# Patient Record
Sex: MALE | Race: WHITE | NOT HISPANIC OR LATINO | Employment: UNEMPLOYED | URBAN - METROPOLITAN AREA
[De-identification: names, ages, dates, MRNs, and addresses within clinical notes are randomized per-mention and may not be internally consistent; named-entity substitution may affect disease eponyms.]

---

## 2018-09-24 ENCOUNTER — APPOINTMENT (OUTPATIENT)
Dept: RADIOLOGY | Facility: MEDICAL CENTER | Age: 58
DRG: 222 | End: 2018-09-24
Attending: NURSE PRACTITIONER
Payer: MEDICARE

## 2018-09-24 ENCOUNTER — HOSPITAL ENCOUNTER (INPATIENT)
Facility: MEDICAL CENTER | Age: 58
LOS: 11 days | DRG: 222 | End: 2018-10-05
Attending: EMERGENCY MEDICINE | Admitting: FAMILY MEDICINE
Payer: MEDICARE

## 2018-09-24 ENCOUNTER — APPOINTMENT (OUTPATIENT)
Dept: RADIOLOGY | Facility: MEDICAL CENTER | Age: 58
DRG: 222 | End: 2018-09-24
Attending: EMERGENCY MEDICINE
Payer: MEDICARE

## 2018-09-24 ENCOUNTER — APPOINTMENT (OUTPATIENT)
Dept: CARDIOLOGY | Facility: MEDICAL CENTER | Age: 58
DRG: 222 | End: 2018-09-24
Attending: NURSE PRACTITIONER
Payer: MEDICARE

## 2018-09-24 DIAGNOSIS — I44.2 COMPLETE HEART BLOCK (HCC): ICD-10-CM

## 2018-09-24 DIAGNOSIS — G89.29 OTHER CHRONIC PAIN: ICD-10-CM

## 2018-09-24 DIAGNOSIS — I21.3 ST ELEVATION MYOCARDIAL INFARCTION (STEMI), UNSPECIFIED ARTERY (HCC): ICD-10-CM

## 2018-09-24 DIAGNOSIS — I48.91 ATRIAL FIBRILLATION WITH RAPID VENTRICULAR RESPONSE (HCC): ICD-10-CM

## 2018-09-24 DIAGNOSIS — I21.02 ST ELEVATION MYOCARDIAL INFARCTION INVOLVING LEFT ANTERIOR DESCENDING (LAD) CORONARY ARTERY (HCC): ICD-10-CM

## 2018-09-24 DIAGNOSIS — I21.02 STEMI INVOLVING LEFT ANTERIOR DESCENDING CORONARY ARTERY (HCC): ICD-10-CM

## 2018-09-24 DIAGNOSIS — R57.0 CARDIOGENIC SHOCK (HCC): ICD-10-CM

## 2018-09-24 PROBLEM — E11.9 DM (DIABETES MELLITUS) (HCC): Status: ACTIVE | Noted: 2018-09-24

## 2018-09-24 PROBLEM — I10 HTN (HYPERTENSION), MALIGNANT: Status: ACTIVE | Noted: 2018-09-24

## 2018-09-24 PROBLEM — F17.200 SMOKING: Status: ACTIVE | Noted: 2018-09-24

## 2018-09-24 PROBLEM — I24.9 ACUTE CORONARY SYNDROME (HCC): Status: ACTIVE | Noted: 2018-09-24

## 2018-09-24 LAB
ABO GROUP BLD: NORMAL
ALBUMIN SERPL BCP-MCNC: 3.5 G/DL (ref 3.2–4.9)
ALBUMIN SERPL BCP-MCNC: 3.7 G/DL (ref 3.2–4.9)
ALBUMIN/GLOB SERPL: 1.3 G/DL
ALBUMIN/GLOB SERPL: 1.5 G/DL
ALP SERPL-CCNC: 68 U/L (ref 30–99)
ALP SERPL-CCNC: 69 U/L (ref 30–99)
ALT SERPL-CCNC: 180 U/L (ref 2–50)
ALT SERPL-CCNC: 230 U/L (ref 2–50)
ANION GAP SERPL CALC-SCNC: 20 MMOL/L (ref 0–11.9)
ANION GAP SERPL CALC-SCNC: 21 MMOL/L (ref 0–11.9)
APPEARANCE UR: ABNORMAL
APTT PPP: 136.4 SEC (ref 24.7–36)
APTT PPP: 29.3 SEC (ref 24.7–36)
AST SERPL-CCNC: 211 U/L (ref 12–45)
AST SERPL-CCNC: 306 U/L (ref 12–45)
BACTERIA #/AREA URNS HPF: NEGATIVE /HPF
BASOPHILS # BLD AUTO: 0.1 % (ref 0–1.8)
BASOPHILS # BLD AUTO: 0.2 % (ref 0–1.8)
BASOPHILS # BLD: 0.03 K/UL (ref 0–0.12)
BASOPHILS # BLD: 0.04 K/UL (ref 0–0.12)
BILIRUB SERPL-MCNC: 1.7 MG/DL (ref 0.1–1.5)
BILIRUB SERPL-MCNC: 2.5 MG/DL (ref 0.1–1.5)
BILIRUB UR QL STRIP.AUTO: NEGATIVE
BLD GP AB SCN SERPL QL: NORMAL
BNP SERPL-MCNC: 633 PG/ML (ref 0–100)
BUN SERPL-MCNC: 37 MG/DL (ref 8–22)
BUN SERPL-MCNC: 38 MG/DL (ref 8–22)
CALCIUM SERPL-MCNC: 8.6 MG/DL (ref 8.5–10.5)
CALCIUM SERPL-MCNC: 8.7 MG/DL (ref 8.5–10.5)
CHLORIDE SERPL-SCNC: 96 MMOL/L (ref 96–112)
CHLORIDE SERPL-SCNC: 96 MMOL/L (ref 96–112)
CO2 SERPL-SCNC: 15 MMOL/L (ref 20–33)
CO2 SERPL-SCNC: 17 MMOL/L (ref 20–33)
COLOR UR: YELLOW
CREAT SERPL-MCNC: 1.47 MG/DL (ref 0.5–1.4)
CREAT SERPL-MCNC: 1.67 MG/DL (ref 0.5–1.4)
EKG IMPRESSION: NORMAL
EKG IMPRESSION: NORMAL
EOSINOPHIL # BLD AUTO: 0.01 K/UL (ref 0–0.51)
EOSINOPHIL # BLD AUTO: 0.01 K/UL (ref 0–0.51)
EOSINOPHIL NFR BLD: 0 % (ref 0–6.9)
EOSINOPHIL NFR BLD: 0.1 % (ref 0–6.9)
EPI CELLS #/AREA URNS HPF: ABNORMAL /HPF
ERYTHROCYTE [DISTWIDTH] IN BLOOD BY AUTOMATED COUNT: 47.3 FL (ref 35.9–50)
ERYTHROCYTE [DISTWIDTH] IN BLOOD BY AUTOMATED COUNT: 48.1 FL (ref 35.9–50)
GLOBULIN SER CALC-MCNC: 2.4 G/DL (ref 1.9–3.5)
GLOBULIN SER CALC-MCNC: 2.6 G/DL (ref 1.9–3.5)
GLUCOSE SERPL-MCNC: 367 MG/DL (ref 65–99)
GLUCOSE SERPL-MCNC: 370 MG/DL (ref 65–99)
GLUCOSE UR STRIP.AUTO-MCNC: >=1000 MG/DL
HCT VFR BLD AUTO: 42.7 % (ref 42–52)
HCT VFR BLD AUTO: 43.2 % (ref 42–52)
HGB BLD-MCNC: 14.2 G/DL (ref 14–18)
HGB BLD-MCNC: 14.3 G/DL (ref 14–18)
HYALINE CASTS #/AREA URNS LPF: ABNORMAL /LPF
IMM GRANULOCYTES # BLD AUTO: 0.1 K/UL (ref 0–0.11)
IMM GRANULOCYTES # BLD AUTO: 0.22 K/UL (ref 0–0.11)
IMM GRANULOCYTES NFR BLD AUTO: 0.5 % (ref 0–0.9)
IMM GRANULOCYTES NFR BLD AUTO: 1.1 % (ref 0–0.9)
INR PPP: 1.34 (ref 0.87–1.13)
INR PPP: 2.49 (ref 0.87–1.13)
KETONES UR STRIP.AUTO-MCNC: 15 MG/DL
LEUKOCYTE ESTERASE UR QL STRIP.AUTO: NEGATIVE
LIPASE SERPL-CCNC: 5 U/L (ref 11–82)
LYMPHOCYTES # BLD AUTO: 0.89 K/UL (ref 1–4.8)
LYMPHOCYTES # BLD AUTO: 1.92 K/UL (ref 1–4.8)
LYMPHOCYTES NFR BLD: 4.5 % (ref 22–41)
LYMPHOCYTES NFR BLD: 9.4 % (ref 22–41)
MCH RBC QN AUTO: 30.5 PG (ref 27–33)
MCH RBC QN AUTO: 31.2 PG (ref 27–33)
MCHC RBC AUTO-ENTMCNC: 33.1 G/DL (ref 33.7–35.3)
MCHC RBC AUTO-ENTMCNC: 33.3 G/DL (ref 33.7–35.3)
MCV RBC AUTO: 91.6 FL (ref 81.4–97.8)
MCV RBC AUTO: 94.3 FL (ref 81.4–97.8)
MICRO URNS: ABNORMAL
MONOCYTES # BLD AUTO: 1.61 K/UL (ref 0–0.85)
MONOCYTES # BLD AUTO: 1.76 K/UL (ref 0–0.85)
MONOCYTES NFR BLD AUTO: 7.9 % (ref 0–13.4)
MONOCYTES NFR BLD AUTO: 8.9 % (ref 0–13.4)
NEUTROPHILS # BLD AUTO: 16.72 K/UL (ref 1.82–7.42)
NEUTROPHILS # BLD AUTO: 16.91 K/UL (ref 1.82–7.42)
NEUTROPHILS NFR BLD: 82.1 % (ref 44–72)
NEUTROPHILS NFR BLD: 85.2 % (ref 44–72)
NITRITE UR QL STRIP.AUTO: NEGATIVE
NRBC # BLD AUTO: 0 K/UL
NRBC # BLD AUTO: 0 K/UL
NRBC BLD-RTO: 0 /100 WBC
NRBC BLD-RTO: 0 /100 WBC
PH UR STRIP.AUTO: 5 [PH]
PLATELET # BLD AUTO: 154 K/UL (ref 164–446)
PLATELET # BLD AUTO: 156 K/UL (ref 164–446)
PMV BLD AUTO: 10.8 FL (ref 9–12.9)
PMV BLD AUTO: 10.9 FL (ref 9–12.9)
POTASSIUM SERPL-SCNC: 4.1 MMOL/L (ref 3.6–5.5)
POTASSIUM SERPL-SCNC: 4.8 MMOL/L (ref 3.6–5.5)
PROT SERPL-MCNC: 6.1 G/DL (ref 6–8.2)
PROT SERPL-MCNC: 6.1 G/DL (ref 6–8.2)
PROT UR QL STRIP: 100 MG/DL
PROTHROMBIN TIME: 16.7 SEC (ref 12–14.6)
PROTHROMBIN TIME: 27 SEC (ref 12–14.6)
RBC # BLD AUTO: 4.58 M/UL (ref 4.7–6.1)
RBC # BLD AUTO: 4.66 M/UL (ref 4.7–6.1)
RBC # URNS HPF: ABNORMAL /HPF
RBC UR QL AUTO: ABNORMAL
RH BLD: NORMAL
SODIUM SERPL-SCNC: 131 MMOL/L (ref 135–145)
SODIUM SERPL-SCNC: 134 MMOL/L (ref 135–145)
SP GR UR STRIP.AUTO: >=1.045
TRANS CELLS #/AREA URNS HPF: ABNORMAL /HPF
TROPONIN I SERPL-MCNC: 32.13 NG/ML (ref 0–0.04)
TROPONIN I SERPL-MCNC: 41.05 NG/ML (ref 0–0.04)
TSH SERPL DL<=0.005 MIU/L-ACNC: 0.84 UIU/ML (ref 0.38–5.33)
UROBILINOGEN UR STRIP.AUTO-MCNC: 1 MG/DL
WBC # BLD AUTO: 19.8 K/UL (ref 4.8–10.8)
WBC # BLD AUTO: 20.4 K/UL (ref 4.8–10.8)
WBC #/AREA URNS HPF: ABNORMAL /HPF

## 2018-09-24 PROCEDURE — 02C03ZZ EXTIRPATION OF MATTER FROM CORONARY ARTERY, ONE ARTERY, PERCUTANEOUS APPROACH: ICD-10-PCS | Performed by: INTERNAL MEDICINE

## 2018-09-24 PROCEDURE — 99223 1ST HOSP IP/OBS HIGH 75: CPT | Mod: AI | Performed by: FAMILY MEDICINE

## 2018-09-24 PROCEDURE — 71045 X-RAY EXAM CHEST 1 VIEW: CPT

## 2018-09-24 PROCEDURE — C1769 GUIDE WIRE: HCPCS

## 2018-09-24 PROCEDURE — 770022 HCHG ROOM/CARE - ICU (200)

## 2018-09-24 PROCEDURE — 5A02210 ASSISTANCE WITH CARDIAC OUTPUT USING BALLOON PUMP, CONTINUOUS: ICD-10-PCS | Performed by: INTERNAL MEDICINE

## 2018-09-24 PROCEDURE — 86900 BLOOD TYPING SEROLOGIC ABO: CPT

## 2018-09-24 PROCEDURE — 85025 COMPLETE CBC W/AUTO DIFF WBC: CPT

## 2018-09-24 PROCEDURE — 700102 HCHG RX REV CODE 250 W/ 637 OVERRIDE(OP): Performed by: FAMILY MEDICINE

## 2018-09-24 PROCEDURE — 92941 PRQ TRLML REVSC TOT OCCL AMI: CPT | Mod: LD | Performed by: INTERNAL MEDICINE

## 2018-09-24 PROCEDURE — 360980 HCHG SINGLE TRANSDUCER

## 2018-09-24 PROCEDURE — 93880 EXTRACRANIAL BILAT STUDY: CPT

## 2018-09-24 PROCEDURE — 92941 PRQ TRLML REVSC TOT OCCL AMI: CPT | Mod: LD

## 2018-09-24 PROCEDURE — 700101 HCHG RX REV CODE 250

## 2018-09-24 PROCEDURE — 93306 TTE W/DOPPLER COMPLETE: CPT

## 2018-09-24 PROCEDURE — B2111ZZ FLUOROSCOPY OF MULTIPLE CORONARY ARTERIES USING LOW OSMOLAR CONTRAST: ICD-10-PCS | Performed by: INTERNAL MEDICINE

## 2018-09-24 PROCEDURE — 700111 HCHG RX REV CODE 636 W/ 250 OVERRIDE (IP)

## 2018-09-24 PROCEDURE — 76937 US GUIDE VASCULAR ACCESS: CPT | Mod: 26 | Performed by: INTERNAL MEDICINE

## 2018-09-24 PROCEDURE — 02703ZZ DILATION OF CORONARY ARTERY, ONE ARTERY, PERCUTANEOUS APPROACH: ICD-10-PCS | Performed by: INTERNAL MEDICINE

## 2018-09-24 PROCEDURE — 99291 CRITICAL CARE FIRST HOUR: CPT

## 2018-09-24 PROCEDURE — C1757 CATH, THROMBECTOMY/EMBOLECT: HCPCS

## 2018-09-24 PROCEDURE — HZ2ZZZZ DETOXIFICATION SERVICES FOR SUBSTANCE ABUSE TREATMENT: ICD-10-PCS | Performed by: INTERNAL MEDICINE

## 2018-09-24 PROCEDURE — 80053 COMPREHEN METABOLIC PANEL: CPT

## 2018-09-24 PROCEDURE — 93458 L HRT ARTERY/VENTRICLE ANGIO: CPT

## 2018-09-24 PROCEDURE — 83690 ASSAY OF LIPASE: CPT

## 2018-09-24 PROCEDURE — 82962 GLUCOSE BLOOD TEST: CPT

## 2018-09-24 PROCEDURE — 84443 ASSAY THYROID STIM HORMONE: CPT

## 2018-09-24 PROCEDURE — 700102 HCHG RX REV CODE 250 W/ 637 OVERRIDE(OP): Performed by: NURSE PRACTITIONER

## 2018-09-24 PROCEDURE — 93458 L HRT ARTERY/VENTRICLE ANGIO: CPT | Mod: 26,59 | Performed by: INTERNAL MEDICINE

## 2018-09-24 PROCEDURE — A9270 NON-COVERED ITEM OR SERVICE: HCPCS | Performed by: NURSE PRACTITIONER

## 2018-09-24 PROCEDURE — 700111 HCHG RX REV CODE 636 W/ 250 OVERRIDE (IP): Performed by: FAMILY MEDICINE

## 2018-09-24 PROCEDURE — 305387 HCHG SUTURES

## 2018-09-24 PROCEDURE — 83880 ASSAY OF NATRIURETIC PEPTIDE: CPT

## 2018-09-24 PROCEDURE — 36415 COLL VENOUS BLD VENIPUNCTURE: CPT

## 2018-09-24 PROCEDURE — 85610 PROTHROMBIN TIME: CPT

## 2018-09-24 PROCEDURE — 99291 CRITICAL CARE FIRST HOUR: CPT | Performed by: INTERNAL MEDICINE

## 2018-09-24 PROCEDURE — 99152 MOD SED SAME PHYS/QHP 5/>YRS: CPT

## 2018-09-24 PROCEDURE — 4A023N7 MEASUREMENT OF CARDIAC SAMPLING AND PRESSURE, LEFT HEART, PERCUTANEOUS APPROACH: ICD-10-PCS | Performed by: INTERNAL MEDICINE

## 2018-09-24 PROCEDURE — C1725 CATH, TRANSLUMIN NON-LASER: HCPCS

## 2018-09-24 PROCEDURE — 700105 HCHG RX REV CODE 258: Performed by: INTERNAL MEDICINE

## 2018-09-24 PROCEDURE — 360979 HCHG DIAGNOSTIC CATH

## 2018-09-24 PROCEDURE — 700111 HCHG RX REV CODE 636 W/ 250 OVERRIDE (IP): Performed by: NURSE PRACTITIONER

## 2018-09-24 PROCEDURE — 93005 ELECTROCARDIOGRAM TRACING: CPT | Performed by: NURSE PRACTITIONER

## 2018-09-24 PROCEDURE — C1894 INTRO/SHEATH, NON-LASER: HCPCS

## 2018-09-24 PROCEDURE — 85730 THROMBOPLASTIN TIME PARTIAL: CPT

## 2018-09-24 PROCEDURE — 84484 ASSAY OF TROPONIN QUANT: CPT

## 2018-09-24 PROCEDURE — A9270 NON-COVERED ITEM OR SERVICE: HCPCS | Performed by: FAMILY MEDICINE

## 2018-09-24 PROCEDURE — 93306 TTE W/DOPPLER COMPLETE: CPT | Mod: 26 | Performed by: INTERNAL MEDICINE

## 2018-09-24 PROCEDURE — 700111 HCHG RX REV CODE 636 W/ 250 OVERRIDE (IP): Performed by: INTERNAL MEDICINE

## 2018-09-24 PROCEDURE — 33967 INSERT I-AORT PERCUT DEVICE: CPT | Performed by: INTERNAL MEDICINE

## 2018-09-24 PROCEDURE — 99152 MOD SED SAME PHYS/QHP 5/>YRS: CPT | Performed by: INTERNAL MEDICINE

## 2018-09-24 PROCEDURE — 700105 HCHG RX REV CODE 258

## 2018-09-24 PROCEDURE — 99153 MOD SED SAME PHYS/QHP EA: CPT

## 2018-09-24 PROCEDURE — 700101 HCHG RX REV CODE 250: Performed by: NURSE PRACTITIONER

## 2018-09-24 PROCEDURE — 93970 EXTREMITY STUDY: CPT

## 2018-09-24 PROCEDURE — 33210 INSERT ELECTRD/PM CATH SNGL: CPT

## 2018-09-24 PROCEDURE — 86901 BLOOD TYPING SEROLOGIC RH(D): CPT

## 2018-09-24 PROCEDURE — 33967 INSERT I-AORT PERCUT DEVICE: CPT

## 2018-09-24 PROCEDURE — 86850 RBC ANTIBODY SCREEN: CPT

## 2018-09-24 PROCEDURE — C1887 CATHETER, GUIDING: HCPCS

## 2018-09-24 PROCEDURE — 93005 ELECTROCARDIOGRAM TRACING: CPT | Performed by: EMERGENCY MEDICINE

## 2018-09-24 PROCEDURE — 304952 HCHG R 2 PADS

## 2018-09-24 PROCEDURE — C1898 LEAD, PMKR, OTHER THAN TRANS: HCPCS

## 2018-09-24 PROCEDURE — 83036 HEMOGLOBIN GLYCOSYLATED A1C: CPT

## 2018-09-24 PROCEDURE — 304182 HCHG INTRA-AORTIC BALOON PUMP

## 2018-09-24 PROCEDURE — 93010 ELECTROCARDIOGRAM REPORT: CPT | Performed by: INTERNAL MEDICINE

## 2018-09-24 PROCEDURE — B2151ZZ FLUOROSCOPY OF LEFT HEART USING LOW OSMOLAR CONTRAST: ICD-10-PCS | Performed by: INTERNAL MEDICINE

## 2018-09-24 PROCEDURE — 304561 HCHG STAT O2

## 2018-09-24 PROCEDURE — 81001 URINALYSIS AUTO W/SCOPE: CPT

## 2018-09-24 RX ORDER — POLYETHYLENE GLYCOL 3350 17 G/17G
1 POWDER, FOR SOLUTION ORAL
Status: DISCONTINUED | OUTPATIENT
Start: 2018-09-24 | End: 2018-10-05 | Stop reason: HOSPADM

## 2018-09-24 RX ORDER — HEPARIN SODIUM 1000 [USP'U]/ML
3200 INJECTION, SOLUTION INTRAVENOUS; SUBCUTANEOUS PRN
Status: DISCONTINUED | OUTPATIENT
Start: 2018-09-24 | End: 2018-09-26

## 2018-09-24 RX ORDER — SODIUM CHLORIDE 9 MG/ML
INJECTION, SOLUTION INTRAVENOUS CONTINUOUS
Status: DISPENSED | OUTPATIENT
Start: 2018-09-24 | End: 2018-09-25

## 2018-09-24 RX ORDER — MORPHINE SULFATE 4 MG/ML
2-4 INJECTION, SOLUTION INTRAMUSCULAR; INTRAVENOUS
Status: DISCONTINUED | OUTPATIENT
Start: 2018-09-24 | End: 2018-10-05 | Stop reason: HOSPADM

## 2018-09-24 RX ORDER — DEXTROSE MONOHYDRATE 25 G/50ML
25 INJECTION, SOLUTION INTRAVENOUS
Status: DISCONTINUED | OUTPATIENT
Start: 2018-09-24 | End: 2018-09-25

## 2018-09-24 RX ORDER — VERAPAMIL HYDROCHLORIDE 2.5 MG/ML
INJECTION, SOLUTION INTRAVENOUS
Status: COMPLETED
Start: 2018-09-24 | End: 2018-09-24

## 2018-09-24 RX ORDER — PROMETHAZINE HYDROCHLORIDE 25 MG/1
25 SUPPOSITORY RECTAL EVERY 6 HOURS PRN
Status: DISCONTINUED | OUTPATIENT
Start: 2018-09-24 | End: 2018-10-05 | Stop reason: HOSPADM

## 2018-09-24 RX ORDER — DEXMEDETOMIDINE HYDROCHLORIDE 4 UG/ML
0-1.5 INJECTION, SOLUTION INTRAVENOUS CONTINUOUS
Status: DISCONTINUED | OUTPATIENT
Start: 2018-09-25 | End: 2018-09-25

## 2018-09-24 RX ORDER — SODIUM CHLORIDE 9 MG/ML
INJECTION, SOLUTION INTRAVENOUS CONTINUOUS
Status: DISCONTINUED | OUTPATIENT
Start: 2018-09-24 | End: 2018-09-24

## 2018-09-24 RX ORDER — SODIUM CHLORIDE 9 MG/ML
INJECTION, SOLUTION INTRAVENOUS
Status: COMPLETED
Start: 2018-09-24 | End: 2018-09-24

## 2018-09-24 RX ORDER — ONDANSETRON 2 MG/ML
INJECTION INTRAMUSCULAR; INTRAVENOUS
Status: COMPLETED
Start: 2018-09-24 | End: 2018-09-24

## 2018-09-24 RX ORDER — LIDOCAINE HYDROCHLORIDE 10 MG/ML
INJECTION, SOLUTION INFILTRATION; PERINEURAL
Status: COMPLETED
Start: 2018-09-24 | End: 2018-09-24

## 2018-09-24 RX ORDER — LABETALOL HYDROCHLORIDE 5 MG/ML
10 INJECTION, SOLUTION INTRAVENOUS EVERY 4 HOURS PRN
Status: DISCONTINUED | OUTPATIENT
Start: 2018-09-24 | End: 2018-09-27

## 2018-09-24 RX ORDER — BISACODYL 10 MG
10 SUPPOSITORY, RECTAL RECTAL
Status: DISCONTINUED | OUTPATIENT
Start: 2018-09-24 | End: 2018-10-05 | Stop reason: HOSPADM

## 2018-09-24 RX ORDER — NICOTINE 21 MG/24HR
21 PATCH, TRANSDERMAL 24 HOURS TRANSDERMAL
Status: DISCONTINUED | OUTPATIENT
Start: 2018-09-25 | End: 2018-10-04

## 2018-09-24 RX ORDER — ALPRAZOLAM 0.25 MG/1
0.25 TABLET ORAL EVERY 6 HOURS PRN
Status: DISCONTINUED | OUTPATIENT
Start: 2018-09-24 | End: 2018-10-05 | Stop reason: HOSPADM

## 2018-09-24 RX ORDER — PROMETHAZINE HYDROCHLORIDE 25 MG/1
25 TABLET ORAL EVERY 6 HOURS PRN
Status: DISCONTINUED | OUTPATIENT
Start: 2018-09-24 | End: 2018-10-05 | Stop reason: HOSPADM

## 2018-09-24 RX ORDER — HEPARIN SODIUM,PORCINE 1000/ML
VIAL (ML) INJECTION
Status: COMPLETED
Start: 2018-09-24 | End: 2018-09-24

## 2018-09-24 RX ORDER — MIDAZOLAM HYDROCHLORIDE 1 MG/ML
INJECTION INTRAMUSCULAR; INTRAVENOUS
Status: DISPENSED
Start: 2018-09-24 | End: 2018-09-25

## 2018-09-24 RX ORDER — LIDOCAINE HYDROCHLORIDE 10 MG/ML
0.5 INJECTION, SOLUTION INFILTRATION; PERINEURAL
Status: ACTIVE | OUTPATIENT
Start: 2018-09-24 | End: 2018-09-25

## 2018-09-24 RX ORDER — NITROGLYCERIN 0.4 MG/1
0.4 TABLET SUBLINGUAL
Status: DISCONTINUED | OUTPATIENT
Start: 2018-09-24 | End: 2018-10-05 | Stop reason: HOSPADM

## 2018-09-24 RX ORDER — AMOXICILLIN 250 MG
2 CAPSULE ORAL 2 TIMES DAILY
Status: DISCONTINUED | OUTPATIENT
Start: 2018-09-24 | End: 2018-10-05 | Stop reason: HOSPADM

## 2018-09-24 RX ORDER — HEPARIN SODIUM 5000 [USP'U]/ML
5000 INJECTION, SOLUTION INTRAVENOUS; SUBCUTANEOUS EVERY 8 HOURS
Status: DISCONTINUED | OUTPATIENT
Start: 2018-09-24 | End: 2018-09-24

## 2018-09-24 RX ORDER — ATORVASTATIN CALCIUM 80 MG/1
80 TABLET, FILM COATED ORAL EVERY EVENING
Status: DISCONTINUED | OUTPATIENT
Start: 2018-09-24 | End: 2018-09-24

## 2018-09-24 RX ADMIN — HEPARIN SODIUM 1200 UNITS/HR: 5000 INJECTION, SOLUTION INTRAVENOUS at 22:48

## 2018-09-24 RX ADMIN — ALPRAZOLAM 0.25 MG: 0.25 TABLET ORAL at 21:28

## 2018-09-24 RX ADMIN — NITROGLYCERIN 10 ML: 20 INJECTION INTRAVENOUS at 18:00

## 2018-09-24 RX ADMIN — HEPARIN SODIUM 5000 UNITS: 5000 INJECTION, SOLUTION INTRAVENOUS; SUBCUTANEOUS at 21:29

## 2018-09-24 RX ADMIN — SODIUM CHLORIDE: 9 INJECTION, SOLUTION INTRAVENOUS at 22:30

## 2018-09-24 RX ADMIN — HEPARIN SODIUM: 1000 INJECTION, SOLUTION INTRAVENOUS; SUBCUTANEOUS at 18:00

## 2018-09-24 RX ADMIN — LIDOCAINE HYDROCHLORIDE: 10 INJECTION, SOLUTION INFILTRATION; PERINEURAL at 18:01

## 2018-09-24 RX ADMIN — MUPIROCIN 2 %: 20 OINTMENT TOPICAL at 21:28

## 2018-09-24 RX ADMIN — ONDANSETRON 4 MG: 2 INJECTION INTRAMUSCULAR; INTRAVENOUS at 18:01

## 2018-09-24 RX ADMIN — VERAPAMIL HYDROCHLORIDE 5 MG: 2.5 INJECTION, SOLUTION INTRAVENOUS at 18:01

## 2018-09-24 RX ADMIN — SODIUM CHLORIDE: 9 INJECTION, SOLUTION INTRAVENOUS at 20:30

## 2018-09-24 RX ADMIN — BIVALIRUDIN 1.75 MG/KG/HR: 250 INJECTION, POWDER, LYOPHILIZED, FOR SOLUTION INTRAVENOUS at 18:16

## 2018-09-24 RX ADMIN — STANDARDIZED SENNA CONCENTRATE AND DOCUSATE SODIUM 2 TABLET: 8.6; 5 TABLET ORAL at 21:28

## 2018-09-24 RX ADMIN — PROMETHAZINE HYDROCHLORIDE 25 MG: 25 TABLET ORAL at 22:38

## 2018-09-24 RX ADMIN — HEPARIN SODIUM 2000 UNITS: 200 INJECTION, SOLUTION INTRAVENOUS at 18:00

## 2018-09-24 RX ADMIN — HEPARIN SODIUM 1200 UNITS/HR: 5000 INJECTION, SOLUTION INTRAVENOUS at 18:50

## 2018-09-24 ASSESSMENT — ENCOUNTER SYMPTOMS
PALPITATIONS: 0
CHILLS: 0
ABDOMINAL PAIN: 0
COUGH: 0
NAUSEA: 0
MYALGIAS: 0
HALLUCINATIONS: 0
EYE DISCHARGE: 0
HEADACHES: 0
BRUISES/BLEEDS EASILY: 0
DIZZINESS: 0
FEVER: 0
EYE PAIN: 0
VOMITING: 0
BLOOD IN STOOL: 0
SHORTNESS OF BREATH: 0

## 2018-09-24 ASSESSMENT — PAIN SCALES - GENERAL
PAINLEVEL_OUTOF10: 5

## 2018-09-25 ENCOUNTER — APPOINTMENT (OUTPATIENT)
Dept: RADIOLOGY | Facility: MEDICAL CENTER | Age: 58
DRG: 222 | End: 2018-09-25
Attending: FAMILY MEDICINE
Payer: MEDICARE

## 2018-09-25 ENCOUNTER — APPOINTMENT (OUTPATIENT)
Dept: RADIOLOGY | Facility: MEDICAL CENTER | Age: 58
DRG: 222 | End: 2018-09-25
Attending: INTERNAL MEDICINE
Payer: MEDICARE

## 2018-09-25 ENCOUNTER — APPOINTMENT (OUTPATIENT)
Dept: RADIOLOGY | Facility: MEDICAL CENTER | Age: 58
DRG: 222 | End: 2018-09-25
Attending: THORACIC SURGERY (CARDIOTHORACIC VASCULAR SURGERY)
Payer: MEDICARE

## 2018-09-25 PROBLEM — D69.6 THROMBOCYTOPENIA (HCC): Status: ACTIVE | Noted: 2018-09-25

## 2018-09-25 PROBLEM — R57.0 CARDIOGENIC SHOCK (HCC): Status: ACTIVE | Noted: 2018-09-25

## 2018-09-25 PROBLEM — E87.1 HYPONATREMIA: Status: ACTIVE | Noted: 2018-09-25

## 2018-09-25 PROBLEM — E87.29 HIGH ANION GAP METABOLIC ACIDOSIS: Status: ACTIVE | Noted: 2018-09-25

## 2018-09-25 PROBLEM — N18.9 ACUTE-ON-CHRONIC KIDNEY INJURY (HCC): Status: ACTIVE | Noted: 2018-09-25

## 2018-09-25 PROBLEM — R79.89 ELEVATED LFTS: Status: ACTIVE | Noted: 2018-09-25

## 2018-09-25 PROBLEM — E80.6 HYPERBILIRUBINEMIA: Status: ACTIVE | Noted: 2018-09-25

## 2018-09-25 PROBLEM — I25.5 ISCHEMIC CARDIOMYOPATHY: Status: ACTIVE | Noted: 2018-09-25

## 2018-09-25 PROBLEM — D72.829 LEUKOCYTOSIS: Status: ACTIVE | Noted: 2018-09-25

## 2018-09-25 PROBLEM — N17.9 ACUTE-ON-CHRONIC KIDNEY INJURY (HCC): Status: ACTIVE | Noted: 2018-09-25

## 2018-09-25 PROBLEM — E11.10 DIABETIC KETOACIDOSIS ASSOCIATED WITH TYPE 2 DIABETES MELLITUS (HCC): Status: ACTIVE | Noted: 2018-09-25

## 2018-09-25 LAB
ABO GROUP BLD: NORMAL
ALBUMIN SERPL BCP-MCNC: 3.4 G/DL (ref 3.2–4.9)
ALBUMIN/GLOB SERPL: 1.1 G/DL
ALP SERPL-CCNC: 70 U/L (ref 30–99)
ALT SERPL-CCNC: 204 U/L (ref 2–50)
ANION GAP SERPL CALC-SCNC: 12 MMOL/L (ref 0–11.9)
AST SERPL-CCNC: 297 U/L (ref 12–45)
B-OH-BUTYR SERPL-MCNC: 1.2 MMOL/L (ref 0.02–0.27)
BASOPHILS # BLD AUTO: 0.2 % (ref 0–1.8)
BASOPHILS # BLD: 0.04 K/UL (ref 0–0.12)
BILIRUB SERPL-MCNC: 1.2 MG/DL (ref 0.1–1.5)
BUN SERPL-MCNC: 39 MG/DL (ref 8–22)
CALCIUM SERPL-MCNC: 9 MG/DL (ref 8.5–10.5)
CHLORIDE SERPL-SCNC: 95 MMOL/L (ref 96–112)
CHOLEST SERPL-MCNC: 128 MG/DL (ref 100–199)
CO2 SERPL-SCNC: 26 MMOL/L (ref 20–33)
CREAT SERPL-MCNC: 1.4 MG/DL (ref 0.5–1.4)
EKG IMPRESSION: NORMAL
EKG IMPRESSION: NORMAL
EOSINOPHIL # BLD AUTO: 0 K/UL (ref 0–0.51)
EOSINOPHIL NFR BLD: 0 % (ref 0–6.9)
ERYTHROCYTE [DISTWIDTH] IN BLOOD BY AUTOMATED COUNT: 45.8 FL (ref 35.9–50)
EST. AVERAGE GLUCOSE BLD GHB EST-MCNC: 306 MG/DL
GLOBULIN SER CALC-MCNC: 3 G/DL (ref 1.9–3.5)
GLUCOSE BLD-MCNC: 125 MG/DL (ref 65–99)
GLUCOSE BLD-MCNC: 139 MG/DL (ref 65–99)
GLUCOSE BLD-MCNC: 139 MG/DL (ref 65–99)
GLUCOSE BLD-MCNC: 149 MG/DL (ref 65–99)
GLUCOSE BLD-MCNC: 153 MG/DL (ref 65–99)
GLUCOSE BLD-MCNC: 166 MG/DL (ref 65–99)
GLUCOSE BLD-MCNC: 166 MG/DL (ref 65–99)
GLUCOSE BLD-MCNC: 167 MG/DL (ref 65–99)
GLUCOSE BLD-MCNC: 173 MG/DL (ref 65–99)
GLUCOSE BLD-MCNC: 174 MG/DL (ref 65–99)
GLUCOSE BLD-MCNC: 180 MG/DL (ref 65–99)
GLUCOSE BLD-MCNC: 203 MG/DL (ref 65–99)
GLUCOSE BLD-MCNC: 222 MG/DL (ref 65–99)
GLUCOSE BLD-MCNC: 246 MG/DL (ref 65–99)
GLUCOSE BLD-MCNC: 246 MG/DL (ref 65–99)
GLUCOSE BLD-MCNC: 288 MG/DL (ref 65–99)
GLUCOSE BLD-MCNC: 305 MG/DL (ref 65–99)
GLUCOSE BLD-MCNC: 351 MG/DL (ref 65–99)
GLUCOSE BLD-MCNC: 378 MG/DL (ref 65–99)
GLUCOSE SERPL-MCNC: 366 MG/DL (ref 65–99)
HBA1C MFR BLD: 12.3 % (ref 0–5.6)
HCT VFR BLD AUTO: 41.2 % (ref 42–52)
HDLC SERPL-MCNC: 24 MG/DL
HGB BLD-MCNC: 14.1 G/DL (ref 14–18)
IMM GRANULOCYTES # BLD AUTO: 0.12 K/UL (ref 0–0.11)
IMM GRANULOCYTES NFR BLD AUTO: 0.6 % (ref 0–0.9)
LDLC SERPL CALC-MCNC: 85 MG/DL
LV EJECT FRACT  99904: 15
LV EJECT FRACT MOD 2C 99903: 33.75
LV EJECT FRACT MOD 4C 99902: 28.5
LV EJECT FRACT MOD BP 99901: 31.43
LYMPHOCYTES # BLD AUTO: 1.46 K/UL (ref 1–4.8)
LYMPHOCYTES NFR BLD: 7.4 % (ref 22–41)
MCH RBC QN AUTO: 30.9 PG (ref 27–33)
MCHC RBC AUTO-ENTMCNC: 34.2 G/DL (ref 33.7–35.3)
MCV RBC AUTO: 90.4 FL (ref 81.4–97.8)
MONOCYTES # BLD AUTO: 1.51 K/UL (ref 0–0.85)
MONOCYTES NFR BLD AUTO: 7.7 % (ref 0–13.4)
NEUTROPHILS # BLD AUTO: 16.49 K/UL (ref 1.82–7.42)
NEUTROPHILS NFR BLD: 84.1 % (ref 44–72)
NRBC # BLD AUTO: 0 K/UL
NRBC BLD-RTO: 0 /100 WBC
OSMOLALITY SERPL: 305 MOSM/KG H2O (ref 278–298)
PLATELET # BLD AUTO: 165 K/UL (ref 164–446)
PMV BLD AUTO: 10.7 FL (ref 9–12.9)
POTASSIUM SERPL-SCNC: 4.3 MMOL/L (ref 3.6–5.5)
PROCALCITONIN SERPL-MCNC: 1.5 NG/ML
PROT SERPL-MCNC: 6.4 G/DL (ref 6–8.2)
RBC # BLD AUTO: 4.56 M/UL (ref 4.7–6.1)
RH BLD: NORMAL
SODIUM SERPL-SCNC: 133 MMOL/L (ref 135–145)
TRIGL SERPL-MCNC: 94 MG/DL (ref 0–149)
TROPONIN I SERPL-MCNC: 43.85 NG/ML (ref 0–0.04)
WBC # BLD AUTO: 19.6 K/UL (ref 4.8–10.8)

## 2018-09-25 PROCEDURE — 99291 CRITICAL CARE FIRST HOUR: CPT | Performed by: INTERNAL MEDICINE

## 2018-09-25 PROCEDURE — 84145 PROCALCITONIN (PCT): CPT

## 2018-09-25 PROCEDURE — 71045 X-RAY EXAM CHEST 1 VIEW: CPT

## 2018-09-25 PROCEDURE — 82010 KETONE BODYS QUAN: CPT

## 2018-09-25 PROCEDURE — 700102 HCHG RX REV CODE 250 W/ 637 OVERRIDE(OP): Performed by: INTERNAL MEDICINE

## 2018-09-25 PROCEDURE — 700105 HCHG RX REV CODE 258: Performed by: INTERNAL MEDICINE

## 2018-09-25 PROCEDURE — A9270 NON-COVERED ITEM OR SERVICE: HCPCS | Performed by: FAMILY MEDICINE

## 2018-09-25 PROCEDURE — 700111 HCHG RX REV CODE 636 W/ 250 OVERRIDE (IP): Performed by: INTERNAL MEDICINE

## 2018-09-25 PROCEDURE — A9270 NON-COVERED ITEM OR SERVICE: HCPCS | Performed by: INTERNAL MEDICINE

## 2018-09-25 PROCEDURE — 770022 HCHG ROOM/CARE - ICU (200)

## 2018-09-25 PROCEDURE — 82962 GLUCOSE BLOOD TEST: CPT | Mod: 91

## 2018-09-25 PROCEDURE — 80061 LIPID PANEL: CPT

## 2018-09-25 PROCEDURE — 700101 HCHG RX REV CODE 250: Performed by: NURSE PRACTITIONER

## 2018-09-25 PROCEDURE — 93005 ELECTROCARDIOGRAM TRACING: CPT | Performed by: FAMILY MEDICINE

## 2018-09-25 PROCEDURE — 83930 ASSAY OF BLOOD OSMOLALITY: CPT

## 2018-09-25 PROCEDURE — 93010 ELECTROCARDIOGRAM REPORT: CPT | Performed by: INTERNAL MEDICINE

## 2018-09-25 PROCEDURE — 700111 HCHG RX REV CODE 636 W/ 250 OVERRIDE (IP): Performed by: FAMILY MEDICINE

## 2018-09-25 PROCEDURE — 700101 HCHG RX REV CODE 250: Performed by: INTERNAL MEDICINE

## 2018-09-25 PROCEDURE — 87040 BLOOD CULTURE FOR BACTERIA: CPT | Mod: 91

## 2018-09-25 PROCEDURE — 80053 COMPREHEN METABOLIC PANEL: CPT

## 2018-09-25 PROCEDURE — 84484 ASSAY OF TROPONIN QUANT: CPT

## 2018-09-25 PROCEDURE — 700105 HCHG RX REV CODE 258: Performed by: FAMILY MEDICINE

## 2018-09-25 PROCEDURE — 76700 US EXAM ABDOM COMPLETE: CPT

## 2018-09-25 PROCEDURE — 700102 HCHG RX REV CODE 250 W/ 637 OVERRIDE(OP): Performed by: FAMILY MEDICINE

## 2018-09-25 PROCEDURE — 99232 SBSQ HOSP IP/OBS MODERATE 35: CPT | Performed by: INTERNAL MEDICINE

## 2018-09-25 PROCEDURE — 85025 COMPLETE CBC W/AUTO DIFF WBC: CPT

## 2018-09-25 RX ORDER — SODIUM CHLORIDE 9 MG/ML
2000 INJECTION, SOLUTION INTRAVENOUS ONCE
Status: COMPLETED | OUTPATIENT
Start: 2018-09-25 | End: 2018-09-25

## 2018-09-25 RX ORDER — MAGNESIUM SULFATE HEPTAHYDRATE 40 MG/ML
4 INJECTION, SOLUTION INTRAVENOUS
Status: DISCONTINUED | OUTPATIENT
Start: 2018-09-25 | End: 2018-09-25

## 2018-09-25 RX ORDER — DEXTROSE MONOHYDRATE 25 G/50ML
12.5-25 INJECTION, SOLUTION INTRAVENOUS PRN
Status: DISCONTINUED | OUTPATIENT
Start: 2018-09-25 | End: 2018-09-27

## 2018-09-25 RX ORDER — SODIUM CHLORIDE 9 MG/ML
INJECTION, SOLUTION INTRAVENOUS CONTINUOUS
Status: DISCONTINUED | OUTPATIENT
Start: 2018-09-25 | End: 2018-09-25

## 2018-09-25 RX ORDER — LORAZEPAM 2 MG/ML
1-2 INJECTION INTRAMUSCULAR
Status: DISCONTINUED | OUTPATIENT
Start: 2018-09-25 | End: 2018-10-05 | Stop reason: HOSPADM

## 2018-09-25 RX ORDER — SODIUM CHLORIDE, SODIUM LACTATE, POTASSIUM CHLORIDE, CALCIUM CHLORIDE 600; 310; 30; 20 MG/100ML; MG/100ML; MG/100ML; MG/100ML
INJECTION, SOLUTION INTRAVENOUS CONTINUOUS
Status: DISCONTINUED | OUTPATIENT
Start: 2018-09-25 | End: 2018-09-27

## 2018-09-25 RX ORDER — MAGNESIUM SULFATE HEPTAHYDRATE 40 MG/ML
2 INJECTION, SOLUTION INTRAVENOUS
Status: DISCONTINUED | OUTPATIENT
Start: 2018-09-25 | End: 2018-09-25

## 2018-09-25 RX ORDER — DEXTROSE AND SODIUM CHLORIDE 10; .45 G/100ML; G/100ML
INJECTION, SOLUTION INTRAVENOUS CONTINUOUS
Status: DISCONTINUED | OUTPATIENT
Start: 2018-09-25 | End: 2018-09-25

## 2018-09-25 RX ORDER — LORAZEPAM 2 MG/ML
INJECTION INTRAMUSCULAR
Status: ACTIVE
Start: 2018-09-25 | End: 2018-09-25

## 2018-09-25 RX ORDER — DEXTROSE AND SODIUM CHLORIDE 5; .9 G/100ML; G/100ML
INJECTION, SOLUTION INTRAVENOUS CONTINUOUS
Status: DISCONTINUED | OUTPATIENT
Start: 2018-09-25 | End: 2018-09-25

## 2018-09-25 RX ADMIN — CHLORPROMAZINE HYDROCHLORIDE 25 MG: 25 INJECTION INTRAMUSCULAR at 23:32

## 2018-09-25 RX ADMIN — SODIUM CHLORIDE 3 UNITS/HR: 9 INJECTION, SOLUTION INTRAVENOUS at 03:41

## 2018-09-25 RX ADMIN — SODIUM CHLORIDE, POTASSIUM CHLORIDE, SODIUM LACTATE AND CALCIUM CHLORIDE: 600; 310; 30; 20 INJECTION, SOLUTION INTRAVENOUS at 14:55

## 2018-09-25 RX ADMIN — THIAMINE HYDROCHLORIDE 100 MG: 100 INJECTION, SOLUTION INTRAMUSCULAR; INTRAVENOUS at 05:52

## 2018-09-25 RX ADMIN — FOLIC ACID 1 MG: 5 INJECTION, SOLUTION INTRAMUSCULAR; INTRAVENOUS; SUBCUTANEOUS at 05:52

## 2018-09-25 RX ADMIN — NICOTINE 21 MG: 21 PATCH, EXTENDED RELEASE TRANSDERMAL at 08:29

## 2018-09-25 RX ADMIN — CHLORPROMAZINE HYDROCHLORIDE 25 MG: 25 INJECTION INTRAMUSCULAR at 10:39

## 2018-09-25 RX ADMIN — SODIUM CHLORIDE: 9 INJECTION, SOLUTION INTRAVENOUS at 03:05

## 2018-09-25 RX ADMIN — SODIUM CHLORIDE, POTASSIUM CHLORIDE, SODIUM LACTATE AND CALCIUM CHLORIDE: 600; 310; 30; 20 INJECTION, SOLUTION INTRAVENOUS at 03:40

## 2018-09-25 RX ADMIN — SODIUM CHLORIDE, POTASSIUM CHLORIDE, SODIUM LACTATE AND CALCIUM CHLORIDE: 600; 310; 30; 20 INJECTION, SOLUTION INTRAVENOUS at 23:30

## 2018-09-25 RX ADMIN — MUPIROCIN 2 %: 20 OINTMENT TOPICAL at 05:34

## 2018-09-25 RX ADMIN — ASPIRIN 81 MG: 81 TABLET, COATED ORAL at 08:29

## 2018-09-25 RX ADMIN — FAMOTIDINE 20 MG: 10 INJECTION, SOLUTION INTRAVENOUS at 05:34

## 2018-09-25 RX ADMIN — SODIUM CHLORIDE 2000 ML: 9 INJECTION, SOLUTION INTRAVENOUS at 02:48

## 2018-09-25 RX ADMIN — LORAZEPAM 1 MG: 2 INJECTION INTRAMUSCULAR; INTRAVENOUS at 02:02

## 2018-09-25 RX ADMIN — MORPHINE SULFATE 2 MG: 4 INJECTION INTRAVENOUS at 16:08

## 2018-09-25 ASSESSMENT — ENCOUNTER SYMPTOMS
MYALGIAS: 0
BLURRED VISION: 0
TINGLING: 0
PHOTOPHOBIA: 0
ABDOMINAL PAIN: 0
CHILLS: 0
HEMOPTYSIS: 0
BRUISES/BLEEDS EASILY: 0
COUGH: 1
APPETITE CHANGE: 0
NAUSEA: 0
VOMITING: 0
PND: 0
HEARTBURN: 0
CONFUSION: 0
CHEST TIGHTNESS: 0
NAUSEA: 1
COUGH: 0
NECK PAIN: 0
BACK PAIN: 0
FATIGUE: 1
DOUBLE VISION: 0
WHEEZING: 0
DEPRESSION: 0
SHORTNESS OF BREATH: 0
NERVOUS/ANXIOUS: 0
SORE THROAT: 0
SHORTNESS OF BREATH: 1
DIZZINESS: 0
STRIDOR: 0
PALPITATIONS: 0
DIZZINESS: 1
HEADACHES: 0
FEVER: 0

## 2018-09-25 ASSESSMENT — COGNITIVE AND FUNCTIONAL STATUS - GENERAL
WALKING IN HOSPITAL ROOM: TOTAL
TURNING FROM BACK TO SIDE WHILE IN FLAT BAD: A LOT
EATING MEALS: A LOT
DAILY ACTIVITIY SCORE: 10
TOILETING: TOTAL
DRESSING REGULAR UPPER BODY CLOTHING: A LOT
DRESSING REGULAR LOWER BODY CLOTHING: TOTAL
SUGGESTED CMS G CODE MODIFIER DAILY ACTIVITY: CL
CLIMB 3 TO 5 STEPS WITH RAILING: TOTAL
STANDING UP FROM CHAIR USING ARMS: TOTAL
HELP NEEDED FOR BATHING: A LOT
MOVING TO AND FROM BED TO CHAIR: UNABLE
PERSONAL GROOMING: A LOT
SUGGESTED CMS G CODE MODIFIER MOBILITY: CM
MOBILITY SCORE: 7
MOVING FROM LYING ON BACK TO SITTING ON SIDE OF FLAT BED: UNABLE

## 2018-09-25 ASSESSMENT — PATIENT HEALTH QUESTIONNAIRE - PHQ9
SUM OF ALL RESPONSES TO PHQ9 QUESTIONS 1 AND 2: 4
SUM OF ALL RESPONSES TO PHQ QUESTIONS 1-9: 5
3. TROUBLE FALLING OR STAYING ASLEEP OR SLEEPING TOO MUCH: NOT AT ALL
4. FEELING TIRED OR HAVING LITTLE ENERGY: NOT AT ALL
8. MOVING OR SPEAKING SO SLOWLY THAT OTHER PEOPLE COULD HAVE NOTICED. OR THE OPPOSITE, BEING SO FIGETY OR RESTLESS THAT YOU HAVE BEEN MOVING AROUND A LOT MORE THAN USUAL: NOT AT ALL
9. THOUGHTS THAT YOU WOULD BE BETTER OFF DEAD, OR OF HURTING YOURSELF: NOT AT ALL
7. TROUBLE CONCENTRATING ON THINGS, SUCH AS READING THE NEWSPAPER OR WATCHING TELEVISION: NOT AT ALL
6. FEELING BAD ABOUT YOURSELF - OR THAT YOU ARE A FAILURE OR HAVE LET YOURSELF OR YOUR FAMILY DOWN: SEVERAL DAYS
1. LITTLE INTEREST OR PLEASURE IN DOING THINGS: MORE THAN HALF THE DAYS
5. POOR APPETITE OR OVEREATING: NOT AT ALL
2. FEELING DOWN, DEPRESSED, IRRITABLE, OR HOPELESS: MORE THAN HALF THE DAYS

## 2018-09-25 ASSESSMENT — LIFESTYLE VARIABLES
TOTAL SCORE: 4
ON A TYPICAL DAY WHEN YOU DRINK ALCOHOL HOW MANY DRINKS DO YOU HAVE: 3
CONSUMPTION TOTAL: POSITIVE
EVER HAD A DRINK FIRST THING IN THE MORNING TO STEADY YOUR NERVES TO GET RID OF A HANGOVER: YES
HAVE YOU EVER FELT YOU SHOULD CUT DOWN ON YOUR DRINKING: YES
ALCOHOL_USE: YES
EVER FELT BAD OR GUILTY ABOUT YOUR DRINKING: YES
TOTAL SCORE: 4
DOES PATIENT WANT TO STOP DRINKING: NO
HAVE PEOPLE ANNOYED YOU BY CRITICIZING YOUR DRINKING: YES
TOTAL SCORE: 4
EVER_SMOKED: YES
HOW MANY TIMES IN THE PAST YEAR HAVE YOU HAD 5 OR MORE DRINKS IN A DAY: 7
EVER_SMOKED: YES
AVERAGE NUMBER OF DAYS PER WEEK YOU HAVE A DRINK CONTAINING ALCOHOL: 4

## 2018-09-25 ASSESSMENT — PAIN SCALES - GENERAL
PAINLEVEL_OUTOF10: 0
PAINLEVEL_OUTOF10: 2
PAINLEVEL_OUTOF10: 2
PAINLEVEL_OUTOF10: 4
PAINLEVEL_OUTOF10: 2
PAINLEVEL_OUTOF10: 0
PAINLEVEL_OUTOF10: 3

## 2018-09-25 ASSESSMENT — COPD QUESTIONNAIRES
HAVE YOU SMOKED AT LEAST 100 CIGARETTES IN YOUR ENTIRE LIFE: YES
DO YOU EVER COUGH UP ANY MUCUS OR PHLEGM?: NO/ONLY WITH OCCASIONAL COLDS OR INFECTIONS
DURING THE PAST 4 WEEKS HOW MUCH DID YOU FEEL SHORT OF BREATH: NONE/LITTLE OF THE TIME
COPD SCREENING SCORE: 3

## 2018-09-25 NOTE — PROGRESS NOTES
2 RN Skin assessment    Monitor Summary    Paced underlying rhythm 3rd Degree AV Block P waves present w/ no QRS seen on EKG    Rate: 80  MA 5  MV .5

## 2018-09-25 NOTE — PROGRESS NOTES
Critical Care Progress Note    Date of admission  9/24/2018    Chief Complaint  58 y.o. male admitted 9/24/2018 with CP and SOB    Hospital Course    58 y.o. male with a past medical history of type 2 diabetes mellitus, coronary artery disease, alcohol abuse, hypertension, active cigarette smoking, presented to [facility] complaining of chest pain and shortness of breath over the last several days.  He was found to have a third-degree AV block and anterior ST elevation myocardial infarction on EKG with elevation of troponin was greater than 20.  He underwent cardiac catheterization that revealed complete thrombosis of LAD and ejection fraction 20%, and was deferred for cardiothoracic surgery evaluation for CABG.  Patient transferred to ICU with intra-aortic balloon pump in place, therefore ICU consult.  On further review of symptoms he admits to nausea and vomiting.        Interval Problem Update  Reviewed last 24 hour events:   - heparin gtt   - [facility] declined surgery due to high risk   - 3rd degree HB --> EP consulted for probable PM, EF 20%   - AAox4   - insulin gtt @ 2   - 100% paced at 80 via TV PM   - IABP 1:4 wean today   - AF, WBC remains elevated   - plts up to 165   - N/V   - improving KAYLEE   - LFTs remain elevated   - trop continues to climb to 44    Review of Systems  Review of Systems   Constitutional: Positive for fatigue. Negative for appetite change, chills and fever.   HENT: Negative for congestion and sore throat.    Eyes: Negative for photophobia.   Respiratory: Positive for cough and shortness of breath. Negative for chest tightness, wheezing and stridor.    Cardiovascular: Positive for chest pain and leg swelling. Negative for palpitations.   Gastrointestinal: Positive for nausea. Negative for abdominal pain and vomiting.   Endocrine: Negative for cold intolerance.   Genitourinary: Negative for decreased urine volume and difficulty urinating.   Musculoskeletal: Negative for back pain and neck  pain.   Skin: Negative for pallor and rash.   Allergic/Immunologic: Negative for immunocompromised state.   Neurological: Negative for dizziness, syncope and headaches.   Hematological: Does not bruise/bleed easily.   Psychiatric/Behavioral: Negative for confusion. The patient is not nervous/anxious.    All other systems reviewed and are negative.       Vital Signs for last 24 hours   Temp:  [36.2 °C (97.1 °F)-37.3 °C (99.1 °F)] 37.2 °C (99 °F)  Pulse:  [] 80  Resp:  [10-31] 16  BP: (117)/(52) 117/52    Hemodynamic parameters for last 24 hours       Vent Settings for last 24 hours   1 lpm n/c    Physical Exam   Physical Exam   Constitutional: He is oriented to person, place, and time. He appears well-developed and well-nourished. No distress.   HENT:   Head: Normocephalic and atraumatic.   Mouth/Throat: Oropharynx is clear and moist.   Eyes: Pupils are equal, round, and reactive to light. Conjunctivae are normal. No scleral icterus.   Neck: Neck supple. JVD present. No tracheal deviation present.   Cardiovascular:  Occasional extrasystoles are present. PMI is displaced.  Exam reveals distant heart sounds and decreased pulses.    No murmur heard.  Paced rhythm, intra-aortic balloon pump with a 1-4 ratio of support   Pulmonary/Chest: No accessory muscle usage. No tachypnea. No respiratory distress. He has no decreased breath sounds. He has no wheezes. He has rales in the right lower field and the left lower field.   Abdominal: Soft. Bowel sounds are normal. He exhibits no distension. There is no tenderness.   Genitourinary: Penis normal.   Genitourinary Comments: Sharma catheter in place   Musculoskeletal: He exhibits edema. He exhibits no tenderness.   Right groin site with arterial and venous catheters without hematoma   Lymphadenopathy:     He has no cervical adenopathy.   Neurological: He is oriented to person, place, and time. No cranial nerve deficit.   Sleepy but easily arousable   Skin: Skin is warm and  dry. There is pallor.   Psychiatric: He has a normal mood and affect. His behavior is normal.   Nursing note and vitals reviewed.      Medications  Current Facility-Administered Medications   Medication Dose Route Frequency Provider Last Rate Last Dose   • LORazepam (ATIVAN) injection 1-2 mg  1-2 mg Intravenous Q2HRS PRN Aditya Bower M.D.   1 mg at 09/25/18 0202   • LORAZEPAM 2 MG/ML INJ SOLN            • thiamine (B-1) 100 mg in D5W 100 mL IVPB  100 mg Intravenous DAILY Aditya Bower M.D. 200 mL/hr at 09/25/18 0552 100 mg at 09/25/18 0552   • folic acid 1 mg in NS 50 mL IVPB  1 mg Intravenous Q24HRS Aditya Bower M.D. 100 mL/hr at 09/25/18 0552 1 mg at 09/25/18 0552   • famotidine (PEPCID) injection 20 mg  20 mg Intravenous DAILY Aditya Bower M.D.   20 mg at 09/25/18 0534   • insulin regular human (HUMULIN/NOVOLIN R) 62.5 Units in  mL infusion per protocol  0-29 Units/hr Intravenous Continuous Aditya Bower M.D. 8 mL/hr at 09/25/18 0710 2 Units/hr at 09/25/18 0710   • dextrose 50% (D50W) injection 25-50 mL  12.5-25 g Intravenous PRN Aditya Bower M.D.       • lactated ringers infusion   Intravenous Continuous Aditya Bower M.D. 100 mL/hr at 09/25/18 0340     • Influenza Vaccine Quad pf injection 0.5 mL  0.5 mL Intramuscular Once Aditya Bower M.D.   Stopped at 09/25/18 0600   • vancomycin 1,000 mg in  mL IVPB  1,000 mg Intravenous Pre-Op Once Galina Taveras, A.P.N.       • aspirin EC (ECOTRIN) tablet 81 mg  81 mg Oral DAILY Dimas Henderson M.D.   Stopped at 09/25/18 0600   • Respiratory Care per Protocol   Nebulization Continuous RT Galina Taveras, A.P.N.       • lidocaine (XYLOCAINE) 1%  injection  0.5 mL Intradermal Once PRN Galina Taveras, A.P.N.       • insulin regular (HUMULIN R) injection 2-15 Units  2-15 Units Subcutaneous Once PRN Galina Taveras A.P.N.       • insulin regular human (HUMULIN/NOVOLIN R) 62.5 Units in  mL infusion per protocol  1-6  Units/hr Intravenous Continuous Galina Peñat, A.P.N.       • EPINEPHrine 1 mg/mL(1:1000) 4 mg in  mL Infusion  0-0.2 mcg/kg/min Intravenous Continuous Galina LMio FrederickNitin, A.P.N.       • dexmedetomidine (PRECEDEX) 400 mcg/100mL NS premix infusion  0-1.5 mcg/kg/hr Intravenous Continuous Galina Peñat, A.P.N.       • mupirocin (BACTROBAN) 2 % ointment   Topical BID Galina Peñat, A.P.N.   2 % at 09/25/18 0534   • ALPRAZolam (XANAX) tablet 0.25 mg  0.25 mg Oral Q6HRS PRN Galina Peñat, A.P.N.   0.25 mg at 09/24/18 2128   • aminocaproic acid (AMICAR) 7.475 g in  mL IV Bolus  100 mg/kg Intravenous Once Galina Frederickakat, A.P.N.       • aminocaproic acid (AMICAR) 5 g in  mL Infusion  2 g/hr Intravenous Once Galina Peñat, A.P.N.       • heparin in NS 2 units/mL (art-line)   Intra-arterial Continuous Dimas Henderson M.D.       • nitroglycerin (NITROSTAT) tablet 0.4 mg  0.4 mg Sublingual Q5 MIN PRN Manuel Gonzalez M.D.       • morphine (pf) 4 mg/ml injection 2-4 mg  2-4 mg Intravenous Q5 MIN PRN Manuel Gonzalez M.D.       • senna-docusate (PERICOLACE or SENOKOT S) 8.6-50 MG per tablet 2 Tab  2 Tab Oral BID Manuel Gonzalez M.D.   Stopped at 09/25/18 0600    And   • polyethylene glycol/lytes (MIRALAX) PACKET 1 Packet  1 Packet Oral QDAY PRN Manuel Gonzalez M.D.        And   • magnesium hydroxide (MILK OF MAGNESIA) suspension 30 mL  30 mL Oral QDAY PRN Manuel Gonzalez M.D.        And   • bisacodyl (DULCOLAX) suppository 10 mg  10 mg Rectal QDAY PRN Manuel Gonzalez M.D.       • labetalol (NORMODYNE,TRANDATE) injection 10 mg  10 mg Intravenous Q4HRS PRN Manuel Gonzalez M.D.       • nicotine (NICODERM) 21 MG/24HR 21 mg  21 mg Transdermal Daily-0600 Manuel Gonzalez M.D.   Stopped at 09/25/18 0600    And   • nicotine polacrilex (NICORETTE) 2 MG piece 2 mg  2 mg Oral Q HOUR PRN Manuel Gonzalez M.D.       • promethazine (PHENERGAN) tablet 25 mg  25 mg Oral Q6HRS PRN Manuel  NOHELIA Gonzalez   25 mg at 18 2238   • promethazine (PHENERGAN) suppository 25 mg  25 mg Rectal Q6HRS PRN Manuel Gonzalez M.D.       • MD Alert...HEPARIN WEIGHT BASED PROTOCOL Pharmacist to implement   Other PRN Galina Taveras, A.P.N.       • heparin injection 3,200 Units  3,200 Units Intravenous PRN Galina Taveras, A.P.N.        And   • heparin infusion 25,000 units in 500 ml 0.45% nacl   Intravenous Continuous Galina Taveras, A.P.N.   Stopped at 18 0452       Fluids    Intake/Output Summary (Last 24 hours) at 18 0802  Last data filed at 18 0600   Gross per 24 hour   Intake           598.09 ml   Output              976 ml   Net          -377.91 ml       Laboratory  Recent Results (from the past 48 hour(s))   EKG (NOW)    Collection Time: 18  5:07 PM   Result Value Ref Range    Report       [facility]    Test Date:  2018  Pt Name:    KIRK FRITZ               Department: ER  MRN:        [id number]                      Room:       T610  Gender:     Male                         Technician: [id number]  :        1960                   Requested By:JAIMIE EASTON  Order #:    [id number]                    Reading MD: JAIMIE EASTON MD    Measurements  Intervals                                Axis  Rate:       50                           P:          86  IA:                                      QRS:        102  QRSD:       166                          T:          -84  QT:         572  QTc:        522    Interpretive Statements  COMPLETE AV BLOCK WITH WIDE QRS COMPLEX  ST Elevation Precordial  STEMI  No previous ECG available for comparison    Electronically Signed On 2018 22:21:36 PDT by JAIMIE EASTON MD     Troponin    Collection Time: 18  5:22 PM   Result Value Ref Range    Troponin I 32.13 (H) 0.00 - 0.04 ng/mL   Btype Natriuretic Peptide    Collection Time: 18  5:22 PM   Result Value Ref Range    B Natriuretic  Peptide 633 (H) 0 - 100 pg/mL   CBC with Differential    Collection Time: 09/24/18  5:22 PM   Result Value Ref Range    WBC 19.8 (H) 4.8 - 10.8 K/uL    RBC 4.58 (L) 4.70 - 6.10 M/uL    Hemoglobin 14.3 14.0 - 18.0 g/dL    Hematocrit 43.2 42.0 - 52.0 %    MCV 94.3 81.4 - 97.8 fL    MCH 31.2 27.0 - 33.0 pg    MCHC 33.1 (L) 33.7 - 35.3 g/dL    RDW 48.1 35.9 - 50.0 fL    Platelet Count 156 (L) 164 - 446 K/uL    MPV 10.9 9.0 - 12.9 fL    Neutrophils-Polys 85.20 (H) 44.00 - 72.00 %    Lymphocytes 4.50 (L) 22.00 - 41.00 %    Monocytes 8.90 0.00 - 13.40 %    Eosinophils 0.10 0.00 - 6.90 %    Basophils 0.20 0.00 - 1.80 %    Immature Granulocytes 1.10 (H) 0.00 - 0.90 %    Nucleated RBC 0.00 /100 WBC    Neutrophils (Absolute) 16.91 (H) 1.82 - 7.42 K/uL    Lymphs (Absolute) 0.89 (L) 1.00 - 4.80 K/uL    Monos (Absolute) 1.76 (H) 0.00 - 0.85 K/uL    Eos (Absolute) 0.01 0.00 - 0.51 K/uL    Baso (Absolute) 0.04 0.00 - 0.12 K/uL    Immature Granulocytes (abs) 0.22 (H) 0.00 - 0.11 K/uL    NRBC (Absolute) 0.00 K/uL   Complete Metabolic Panel (CMP)    Collection Time: 09/24/18  5:22 PM   Result Value Ref Range    Sodium 134 (L) 135 - 145 mmol/L    Potassium 4.1 3.6 - 5.5 mmol/L    Chloride 96 96 - 112 mmol/L    Co2 17 (L) 20 - 33 mmol/L    Anion Gap 21.0 (H) 0.0 - 11.9    Glucose 370 (H) 65 - 99 mg/dL    Bun 37 (H) 8 - 22 mg/dL    Creatinine 1.67 (H) 0.50 - 1.40 mg/dL    Calcium 8.6 8.5 - 10.5 mg/dL    AST(SGOT) 211 (H) 12 - 45 U/L    ALT(SGPT) 180 (H) 2 - 50 U/L    Alkaline Phosphatase 68 30 - 99 U/L    Total Bilirubin 2.5 (H) 0.1 - 1.5 mg/dL    Albumin 3.5 3.2 - 4.9 g/dL    Total Protein 6.1 6.0 - 8.2 g/dL    Globulin 2.6 1.9 - 3.5 g/dL    A-G Ratio 1.3 g/dL   Prothrombin Time    Collection Time: 09/24/18  5:22 PM   Result Value Ref Range    PT 16.7 (H) 12.0 - 14.6 sec    INR 1.34 (H) 0.87 - 1.13   APTT    Collection Time: 09/24/18  5:22 PM   Result Value Ref Range    APTT 29.3 24.7 - 36.0 sec   Lipase    Collection Time: 09/24/18   5:22 PM   Result Value Ref Range    Lipase 5 (L) 11 - 82 U/L   ESTIMATED GFR    Collection Time: 18  5:22 PM   Result Value Ref Range    GFR If  51 (A) >60 mL/min/1.73 m 2    GFR If Non  42 (A) >60 mL/min/1.73 m 2   COD (Adult)    Collection Time: 18  5:22 PM   Result Value Ref Range    ABO Grouping Only A     Rh Grouping Only POS     Antibody Screen-Cod NEG    EKG    Collection Time: 18  5:28 PM   Result Value Ref Range    Report       [facility]    Test Date:  2018  Pt Name:    KIRK [facility]               Department: ER  MRN:        [id number]                      Room:       T610  Gender:     Male                         Technician: [id number]  :        1960                   Requested By:JAIMIE ESATON  Order #:    [id number]                    Reading MD: JAIMIE EASTON MD    Measurements  Intervals                                Axis  Rate:       52                           P:          0  NV:                                      QRS:        100  QRSD:       132                          T:          -80  QT:         524  QTc:        488    Interpretive Statements  COMPLETE AV BLOCK, A-RATE 116  PROBABLE RVH W/ SECONDARY REPOL ABNORMALITY  ABNORMAL T, CONSIDER ISCHEMIA, LATERAL LEADS  ST ELEVATION, CONSIDER ANTERIOR INJURY  BORDERLINE PROLONGED QT INTERVAL  BASELINE WANDER IN LEAD(S) V2  Compared to ECG 2018 17:07:26  T-wave abnormality now present  Possible is chemia now present  ST (T wave) deviation now present  Myocardial infarct finding now present    Electronically Signed On 2018 22:21:41 PDT by JAIMIE EASTON MD     EKG    Collection Time: 18  8:49 PM   Result Value Ref Range    Report       [facility]    Test Date:  2018  Pt Name:    KIRK [facility]               Department: ER  MRN:        [id number]                      Room:       T610  Gender:     Male                          Technician: ARDEN  :        1960                   Requested By:STACIE PERES  Order #:    [id number]                    Reading MD: Ran Page MD    Measurements  Intervals                                Axis  Rate:       76                           P:          0  MN:         175                          QRS:        -81  QRSD:       126                          T:          84  QT:         444  QTc:        500    Interpretive Statements  VENTRICULAR-PACED RHYTHM  NO FURTHER ANALYSIS ATTEMPTED DUE TO PACED RHYTHM  Compared to ECG 2018 17:28:59  AV block, complete (third-degree) no longer present  T-wave abnormality no longer present  Possible ischemia no longer present  ST (T wave) deviation no longer present  Myocardial infarct finding no longer present    Electron ically Signed On 2018 6:18:47 PDT by Ran Page MD     Comp Metabolic Panel (CMP)    Collection Time: 18  9:00 PM   Result Value Ref Range    Sodium 131 (L) 135 - 145 mmol/L    Potassium 4.8 3.6 - 5.5 mmol/L    Chloride 96 96 - 112 mmol/L    Co2 15 (L) 20 - 33 mmol/L    Anion Gap 20.0 (H) 0.0 - 11.9    Glucose 367 (H) 65 - 99 mg/dL    Bun 38 (H) 8 - 22 mg/dL    Creatinine 1.47 (H) 0.50 - 1.40 mg/dL    Calcium 8.7 8.5 - 10.5 mg/dL    AST(SGOT) 306 (H) 12 - 45 U/L    ALT(SGPT) 230 (H) 2 - 50 U/L    Alkaline Phosphatase 69 30 - 99 U/L    Total Bilirubin 1.7 (H) 0.1 - 1.5 mg/dL    Albumin 3.7 3.2 - 4.9 g/dL    Total Protein 6.1 6.0 - 8.2 g/dL    Globulin 2.4 1.9 - 3.5 g/dL    A-G Ratio 1.5 g/dL   CBC with Differential    Collection Time: 18  9:00 PM   Result Value Ref Range    WBC 20.4 (H) 4.8 - 10.8 K/uL    RBC 4.66 (L) 4.70 - 6.10 M/uL    Hemoglobin 14.2 14.0 - 18.0 g/dL    Hematocrit 42.7 42.0 - 52.0 %    MCV 91.6 81.4 - 97.8 fL    MCH 30.5 27.0 - 33.0 pg    MCHC 33.3 (L) 33.7 - 35.3 g/dL    RDW 47.3 35.9 - 50.0 fL    Platelet Count 154 (L) 164 - 446 K/uL    MPV 10.8 9.0 - 12.9 fL    Neutrophils-Polys 82.10  (H) 44.00 - 72.00 %    Lymphocytes 9.40 (L) 22.00 - 41.00 %    Monocytes 7.90 0.00 - 13.40 %    Eosinophils 0.00 0.00 - 6.90 %    Basophils 0.10 0.00 - 1.80 %    Immature Granulocytes 0.50 0.00 - 0.90 %    Nucleated RBC 0.00 /100 WBC    Neutrophils (Absolute) 16.72 (H) 1.82 - 7.42 K/uL    Lymphs (Absolute) 1.92 1.00 - 4.80 K/uL    Monos (Absolute) 1.61 (H) 0.00 - 0.85 K/uL    Eos (Absolute) 0.01 0.00 - 0.51 K/uL    Baso (Absolute) 0.03 0.00 - 0.12 K/uL    Immature Granulocytes (abs) 0.10 0.00 - 0.11 K/uL    NRBC (Absolute) 0.00 K/uL   Hemoglobin  A1c    Collection Time: 09/24/18  9:00 PM   Result Value Ref Range    Glycohemoglobin 12.3 (H) 0.0 - 5.6 %    Est Avg Glucose 306 mg/dL   Prothrombin time (INR)    Collection Time: 09/24/18  9:00 PM   Result Value Ref Range    PT 27.0 (H) 12.0 - 14.6 sec    INR 2.49 (H) 0.87 - 1.13   APTT (PTT)    Collection Time: 09/24/18  9:00 PM   Result Value Ref Range    APTT 136.4 (HH) 24.7 - 36.0 sec   TSH (Thyroid Stimulating Hormone)    Collection Time: 09/24/18  9:00 PM   Result Value Ref Range    TSH 0.840 0.380 - 5.330 uIU/mL   TROPONIN    Collection Time: 09/24/18  9:00 PM   Result Value Ref Range    Troponin I 41.05 (H) 0.00 - 0.04 ng/mL   ESTIMATED GFR    Collection Time: 09/24/18  9:00 PM   Result Value Ref Range    GFR If  59 (A) >60 mL/min/1.73 m 2    GFR If Non African American 49 (A) >60 mL/min/1.73 m 2   EC-ECHOCARDIOGRAM COMPLETE W/O CONT    Collection Time: 09/24/18  9:19 PM   Result Value Ref Range    Eject.Frac. MOD BP 31.43     Eject.Frac. MOD 4C 28.5     Eject.Frac. MOD 2C 33.75     Left Ventrical Ejection Fraction 15    Urinalysis    Collection Time: 09/24/18 10:07 PM   Result Value Ref Range    Micro Urine Req Microscopic     Color Yellow     Character Cloudy (A)     Specific Gravity >=1.045 (A) <1.035    Ph 5.0 5.0 - 8.0    Glucose >=1000 (A) Negative mg/dL    Ketones 15 (A) Negative mg/dL    Protein 100 (A) Negative mg/dL    Bilirubin Negative  Negative    Urobilinogen, Urine 1.0 Negative    Nitrite Negative Negative    Leukocyte Esterase Negative Negative    Occult Blood Trace (A) Negative   URINE MICROSCOPIC (W/UA)    Collection Time: 18 10:07 PM   Result Value Ref Range    WBC 0-2 (A) /hpf    RBC 2-5 (A) /hpf    Bacteria Negative None /hpf    Epithelial Cells Rare /hpf    Trans Epithelial Cells Rare /hpf    Hyaline Cast 0-2 /lpf   ACCU-CHEK GLUCOSE    Collection Time: 18 10:42 PM   Result Value Ref Range    Glucose - Accu-Ck 378 (H) 65 - 99 mg/dL   EKG in four (4) hours    Collection Time: 18 12:58 AM   Result Value Ref Range    Report       [facility]    Test Date:  2018  Pt Name:    KIKR FRITZ               Department: 161  MRN:        [id number]                      Room:       RUST  Gender:     Male                         Technician: [facility]  :        1960                   Requested By:NEELA BENTLEY  Order #:    [id number]                    Reading MD: Ran Page MD    Measurements  Intervals                                Axis  Rate:       117                          P:  IA:                                      QRS:        77  QRSD:       118                          T:          236  QT:         342  QTc:        477    Interpretive Statements  PACEMAKER APPEARS TO BE TURNED OFF WITH UNDERLYING COMPLETE HEART BLOCK    Electronically Signed On 2018 6:21:54 PDT by Ran Page MD     ACCU-CHEK GLUCOSE    Collection Time: 18  1:10 AM   Result Value Ref Range    Glucose - Accu-Ck 351 (H) 65 - 99 mg/dL   ABO AND RH CONFIRMATION    Collection Time: 18  1:28 AM   Result Value Ref Range    ABO Confirm A     Second Rh Group POS    CBC WITH DIFFERENTIAL    Collection Time: 18  1:28 AM   Result Value Ref Range    WBC 19.6 (H) 4.8 - 10.8 K/uL    RBC 4.56 (L) 4.70 - 6.10 M/uL    Hemoglobin 14.1 14.0 - 18.0 g/dL    Hematocrit 41.2 (L) 42.0 - 52.0 %    MCV 90.4 81.4 - 97.8 fL    MCH  30.9 27.0 - 33.0 pg    MCHC 34.2 33.7 - 35.3 g/dL    RDW 45.8 35.9 - 50.0 fL    Platelet Count 165 164 - 446 K/uL    MPV 10.7 9.0 - 12.9 fL    Neutrophils-Polys 84.10 (H) 44.00 - 72.00 %    Lymphocytes 7.40 (L) 22.00 - 41.00 %    Monocytes 7.70 0.00 - 13.40 %    Eosinophils 0.00 0.00 - 6.90 %    Basophils 0.20 0.00 - 1.80 %    Immature Granulocytes 0.60 0.00 - 0.90 %    Nucleated RBC 0.00 /100 WBC    Neutrophils (Absolute) 16.49 (H) 1.82 - 7.42 K/uL    Lymphs (Absolute) 1.46 1.00 - 4.80 K/uL    Monos (Absolute) 1.51 (H) 0.00 - 0.85 K/uL    Eos (Absolute) 0.00 0.00 - 0.51 K/uL    Baso (Absolute) 0.04 0.00 - 0.12 K/uL    Immature Granulocytes (abs) 0.12 (H) 0.00 - 0.11 K/uL    NRBC (Absolute) 0.00 K/uL   COMP METABOLIC PANEL    Collection Time: 09/25/18  1:28 AM   Result Value Ref Range    Sodium 133 (L) 135 - 145 mmol/L    Potassium 4.3 3.6 - 5.5 mmol/L    Chloride 95 (L) 96 - 112 mmol/L    Co2 26 20 - 33 mmol/L    Anion Gap 12.0 (H) 0.0 - 11.9    Glucose 366 (H) 65 - 99 mg/dL    Bun 39 (H) 8 - 22 mg/dL    Creatinine 1.40 0.50 - 1.40 mg/dL    Calcium 9.0 8.5 - 10.5 mg/dL    AST(SGOT) 297 (H) 12 - 45 U/L    ALT(SGPT) 204 (H) 2 - 50 U/L    Alkaline Phosphatase 70 30 - 99 U/L    Total Bilirubin 1.2 0.1 - 1.5 mg/dL    Albumin 3.4 3.2 - 4.9 g/dL    Total Protein 6.4 6.0 - 8.2 g/dL    Globulin 3.0 1.9 - 3.5 g/dL    A-G Ratio 1.1 g/dL   Lipid Profile (Lipid Panel) Fasting    Collection Time: 09/25/18  1:28 AM   Result Value Ref Range    Cholesterol,Tot 128 100 - 199 mg/dL    Triglycerides 94 0 - 149 mg/dL    HDL 24 (A) >=40 mg/dL    LDL 85 <100 mg/dL   TROPONIN    Collection Time: 09/25/18  1:28 AM   Result Value Ref Range    Troponin I 43.85 (H) 0.00 - 0.04 ng/mL   ESTIMATED GFR    Collection Time: 09/25/18  1:28 AM   Result Value Ref Range    GFR If African American >60 >60 mL/min/1.73 m 2    GFR If Non African American 52 (A) >60 mL/min/1.73 m 2   BETA-HYDROXYBUTYRIC ACID    Collection Time: 09/25/18  1:42 AM   Result  Value Ref Range    beta-Hydroxybutyric Acid 1.20 (H) 0.02 - 0.27 mmol/L   OSMOLALITY SERUM    Collection Time: 09/25/18  1:42 AM   Result Value Ref Range    Osmolality Serum 305 (H) 278 - 298 mOsm/kg H2O   ACCU-CHEK GLUCOSE    Collection Time: 09/25/18  3:43 AM   Result Value Ref Range    Glucose - Accu-Ck 305 (H) 65 - 99 mg/dL   ACCU-CHEK GLUCOSE    Collection Time: 09/25/18  4:49 AM   Result Value Ref Range    Glucose - Accu-Ck 246 (H) 65 - 99 mg/dL       Imaging  EKG:  I have personally reviewed the images and compared with prior images. and My impression is: Unchanged enlarged cardiac silhouette with mild perihilar fullness/edema, cervical hardware, temporary transvenous pacer wire and intra-aortic balloon pump with tip seen on x-ray in good position   *Bedside limited ultrasound of heart revealing depressed LV function, dilated right atrium with moderate right ventricular function, IVC measuring 2.1 cm without respiratory variability, no significant pericardial effusion    Assessment/Plan  * STEMI (ST elevation myocardial infarction) ([facility])   Assessment & Plan    S/P angioplasty 9/24  Continue medical management including antiplatelets, statin  Heparin drip  Echocardiography  Cardiothoracic surgery evaluation        Cardiogenic shock ([facility])   Assessment & Plan    Continue mechanical support with intra-aortic balloon pump  Inotrope if needed  Trend lactic acid level and blood pressure closely  Currently intravascularly repleted        Diabetic ketoacidosis associated with type 2 diabetes mellitus ([facility])- (present on admission)   Assessment & Plan    Continue insulin drip targeting a glucose less than 180        Complete heart block by electrocardiogram ([facility])   Assessment & Plan    Continue transvenous pacing with plans for probable permanent pacemaker placement  Chronotropic vasoactive medication if needed (i.e. dopamine versus epinephrine)  Optimize electrolytes        High anion gap metabolic acidosis- (present  on admission)   Assessment & Plan    Improving        Acute-on-chronic kidney injury (HCC)- (present on admission)   Assessment & Plan    Improving slightly today, likely ATN  Avoid nephrotoxins  Monitor daily GFR and urine output  Maintain perfusion to kidneys        Hyponatremia- (present on admission)   Assessment & Plan    Hypervolemic  Monitor        Leukocytosis- (present on admission)   Assessment & Plan    Likely reactive, doubt infection  Monitor off antibiotics currently        Elevated LFTs- (present on admission)   Assessment & Plan    Secondary to hepatic congestion and heart failure/shock  Avoid hepatotoxins  Monitor daily LFTs        Thrombocytopenia (HCC)   Assessment & Plan    Monitor with daily CBC  Conservative transfusion strategy        Ischemic cardiomyopathy- (present on admission)   Assessment & Plan    Echocardiography  Eventual beta-blocker and ACE inhibitor  Eventual diuretic        Hyperbilirubinemia- (present on admission)   Assessment & Plan    Secondary to hepatic congestion, monitor        HTN (hypertension), malignant   Assessment & Plan    Currently in shock  Resume antihypertensives once appropriate        Smoking   Assessment & Plan    Tobacco cessation education             VTE:  Heparin  Ulcer: Not Indicated  Lines: Central Line  Ongoing indication addressed, arterial line ongoing indication addressed, Sharma catheter same    I have performed a physical exam and reviewed and updated ROS and Plan today (9/25/2018). In review of yesterday's note (9/24/2018), there are no changes except as documented above.  Patient is critically ill today requiring my active management of his chemical and mechanical cardiac support in the setting of shock.    Discussed patient condition and risk of morbidity and/or mortality with Hospitalist, RN, RT, Pharmacy, Charge nurse / hot rounds, Patient and cardiology and CVS  The patient remains critically ill.  Critical care time = 40 minutes in directly  providing and coordinating critical care and extensive data review.  No time overlap and excludes procedures.

## 2018-09-25 NOTE — PROCEDURES
DATE OF SERVICE:  09/24/2018    REFERRING PHYSICIAN:  Dr. David Page.    PROCEDURE:  1.  Left heart catheterization.  2.  Coronary angiography.  3.  Thrombectomy/percutaneous transluminal coronary angioplasty of the ostial,   proximal and mid left anterior descending artery.  4.  Left ventriculogram.  5.  Intraaortic balloon pump placement.  6.  Monitor for conscious sedation.    PREPROCEDURE DIAGNOSES:  1.  Acute anterior myocardial infarction, STEMI.  2.  Complete atrioventricular block.    POSTPROCEDURE DIAGNOSES:  1.  Multivessel coronary artery disease with occluded ostial proximal left   anterior descending artery with long dissection of the mid portion, large   diagonal branch with proximal concentric 99% stenosis, large ramus intermedius   with proximal concentric 80% stenosis, nondominant circumflex artery,   moderate in caliber with small obtuse marginal branch without significant   stenosis, dominant right coronary artery which is occluded at the ostium with   small to moderate posterior descending artery and posterior lateral branches   filling via left to right collaterals.  2.  Reduced left ventricular systolic function with ejection fraction of 20%.  3.  Elevated left ventricular end-diastolic pressure.  4.  Successful intraaortic balloon pump placement.  5.  Successful temporary pacemaker placement    INDICATION:  The patient is a 58-year-old male with past medical history   significant for diabetes, chronic tobacco abuse, and strong family history of   heart disease.  He was transferred from [facility] for STEMI with  anterior myocardial infarction and complete heart block. He was scheduled   for emergent percutaneous intervention.    DESCRIPTION OF PROCEDURE:  The patient was brought to the cardiac   catheterization laboratory.  He was prepped and draped in the usual sterile   manner.  The right inguinal area was anesthetized with 2% Xylocaine.  A   6-Solomon Islander sheath was inserted into the right  femoral artery using the modified   Seldinger technique under ultrasound guidance.  A second 6-Burkinan sheath was   inserted in the right femoral vein using modified Seldinger technique under   ultrasound guidance.  Through the venous sheath, a temporary pacemaker was   positioned at the right ventricle.  Pacing was initiated.  The arterial   sheath, a 6-Burkinan pigtail catheter was positioned into the left ventricle.    Left angiography was performed.  This was exchanged for a 6-Burkinan JR4   catheter which was positioned into right coronary artery.  Coronary   angiography was performed.  This was exchanged for an EBU 3.5 guide catheter,   which was positioned into the left main coronary artery.  Coronary angiography   was performed.  IV Angiomax was started.  A  150 wire was used to cross   the identified occlusion of the left anterior descending artery.  Thrombectomy   with Priority One device was performed.  PTCA with 2.5x20 mm Emerge balloon   was performed.  The case was discussed with Dr. Rocky Hightower.  It was decided   that he be medically managed and considered for coronary artery bypass graft   surgery.  The wire and guide was removed.  The sheath was upgraded and   intraaortic balloon pump was positioned.  IV Angiomax was discontinued and IV   heparin was started.  The patient was stabilized and transferred to [facility] in   stable condition.    HEMODYNAMIC DATA:  Hemodynamic data shows aortic pressures of 100/60 with mean   of 80 mmHg and /0 with LVEDP of 20 mmHg.    AORTIC VALVE:  There was no significant gradient.    LEFT VENTRICULOGRAM:  A 10 mL of contrast was delivered for 3 seconds.    Ejection fraction was estimated to be 20%.  There was large anterior apical   akinesis noted.    ANGIOGRAM:  LEFT MAIN CORONARY ARTERY:  Left main coronary artery is a moderate length   large caliber vessel free of disease.  LEFT ANTERIOR DESCENDING ARTERY:  Left anterior descending artery is a long,    mild-to-moderate caliber vessel which wraps around the apex.  It is occluded   at the ostium.  Proximal to mid portion of the vessel with a long area of   dissection.  There is an early originating large diagonal branch with proximal   concentric 90% stenosis.  RAMUS INTERMEDIUS:  The ramus intermedius is a very large caliber vessel with   ostial concentric 80% stenosis.  LEFT CIRCUMFLEX ARTERY:  Left circumflex artery is a nondominant small caliber   vessel with small obtuse marginal branches noted free of disease.  RIGHT CORONARY ARTERY:  Right coronary artery is a dominant vessel which is   occluded at the ostial portion.  Distally posterior descending artery and   posterior lateral branches fills via left to right collaterals.    PERCUTANEOUS INTERVENTION:   1. Thrombectomy with Priority One device.    PTCA with 2.5x20 mm Emerge balloon.    2. Intraaortic balloon pump placemen.  3. Temporary pacemaker balloon.    RECOMMENDATIONS:  Recommend medical stabilization and consideration for   coronary artery bypass graft surgery.    SEDATION: The patient's sedation was managed by myself with continuous   face to face time with the patient for 15 minutes from 17:55 to 18:00.       ____________________________________     MD EFRAÍN CARDOSO / CHRIS    DD:  09/24/2018 19:26:23  DT:  09/24/2018 21:22:22    D#:  [id number]  Job#:  [id number]

## 2018-09-25 NOTE — PROGRESS NOTES
Spoke with Dr. Carrera regarding pt BP and IABP.  Per MD will stop heparin at 1300 in preparation for  IABP removal.  Dr. Carrera saying that he will call at 1400 to discuss POC.

## 2018-09-25 NOTE — CONSULTS
Critical Care/Pulmonary Consultation    Date of service: 9/24/2018    Consulting Physician: No att. providers found    Chief Complaint: Chest pain    History of Present Illness: Nisa Ross is a 58 y.o. male with a past medical history of type 2 diabetes mellitus, coronary artery disease, alcohol abuse, hypertension, active cigarette smoking, presented to [facility] complaining of chest pain and shortness of breath over the last several days.  He was found to have a third-degree AV block and anterior ST elevation myocardial infarction on EKG with elevation of troponin was greater than 20.  He underwent cardiac catheterization that revealed complete thrombosis of LAD and ejection fraction 20%, and was deferred for cardiothoracic surgery evaluation for CABG.  Patient transferred to ICU with intra-aortic balloon pump in place, therefore ICU consult.  On further review of symptoms he admits to nausea and vomiting.    ROS: As per HPI, otherwise all systems been reviewed and were negative    No current facility-administered medications on file prior to encounter.      Current Outpatient Prescriptions on File Prior to Encounter   Medication Sig Dispense Refill   • Oxycodone-Acetaminophen (PERCOCET)  MG TABS Take 1-2 Tabs by mouth every four hours as needed for Moderate Pain. If unable to take may take lortab elixir as written      • hydrocodone-acetaminophen 2.5-167 mg/5 mL solution (LORTAB) 7.5-500 MG/15ML SOLN Take 15 mL by mouth 4 times a day as needed for Mild Pain.     • pioglitazone (ACTOS) 45 MG TABS Take 45 mg by mouth every day.     • pravastatin (PRAVACHOL) 40 MG tablet Take 40 mg by mouth every evening.     • gabapentin (NEURONTIN) 300 MG CAPS Take 300 mg by mouth 3 times a day.     • glyBURIDE (DIABETA) 5 MG TABS Take 5 mg by mouth 2 Times a Day.     • metformin (GLUCOPHAGE) 1000 MG tablet Take 1,000 mg by mouth 2 times a day, with meals.     • Propoxyphene N-APAP (DARVOCET-N 50 PO) Take  by  "mouth.     • carisoprodol (SOMA) 350 MG TABS Take 350 mg by mouth 4 times a day.         Social History   Substance Use Topics   • Smoking status: Current Every Day Smoker   • Smokeless tobacco: Not on file      Comment: 1 pack isabel day and a half   • Alcohol use Yes      Comment: socially        Past Medical History:   Diagnosis Date   • Acute coronary syndrome (HCC) 9/24/2018   • Complete heart block by electrocardiogram ([facility]) 9/24/2018   • Diabetes     type II   • DM (diabetes mellitus) ([facility]) 9/24/2018   • HTN (hypertension), malignant 9/24/2018   • Rheumatic fever     1974   • Smoking 9/24/2018   • STEMI (ST elevation myocardial infarction) ([facility]) 9/24/2018       Past Surgical History:   Procedure Laterality Date   • CERVICAL DISK AND FUSION ANTERIOR  4/23/2010    Performed by ESTHER BELL at SURGERY [facility]   • OTHER ORTHOPEDIC SURGERY  1975    right leg        Allergies: Patient has no known allergies.    No family history on file.    Vitals:    09/24/18 1705 09/24/18 1706 09/24/18 1709 09/24/18 2000   Height: 1.778 m (5' 10\")   1.778 m (5' 10\")   Weight: 74.8 kg (165 lb) 74.8 kg (165 lb)  78.2 kg (172 lb 6.4 oz)   Weight % change since last entry.: 0 % 0 %  4.48 %   BP:   117/52    Pulse:   (!) 51 78   BMI (Calculated): 23.68   24.74   Resp:   18 19   Temp:   36.8 °C (98.2 °F) 36.2 °C (97.1 °F)    09/24/18 2015 09/24/18 2030 09/24/18 2100 09/24/18 2115   Height:       Weight:       Weight % change since last entry.:       BP:       Pulse: 77 (!) 139 78 78   BMI (Calculated):       Resp: 20 16 (!) 10 18   Temp:        09/24/18 2130 09/24/18 2145 09/24/18 2200 09/24/18 2215   Height:       Weight:       Weight % change since last entry.:       BP:       Pulse: 78 78 78 78   BMI (Calculated):       Resp: 19 20 16 16   Temp:        09/24/18 2230 09/24/18 2245 09/24/18 2300 09/24/18 2315   Height:       Weight:       Weight % change since last entry.:       BP:       Pulse: 78 78 76    BMI (Calculated):   "     Resp: 14 19 16 16   Temp:        09/25/18 0000   Height:    Weight:    Weight % change since last entry.:    BP:    Pulse: 76   BMI (Calculated):    Resp: (!) 10   Temp: 37.3 °C (99.1 °F)       Physical Examination  General: Average built  Neuro/Psych: CN II-XII grossly within normal limits, no focal neurological deficits, alert and oriented x4  HEENT: Head is normocephalic, atraumatic, PERRLA, EOMI  CVS: S1-S2 diminished, soft precordial systolic murmur, regular heart rate, intra-aortic balloon pump in place, no peripheral lower extremities edema  Respiratory: Clear but diminished breath sounds bilateral on anterior and lateral chest, trachea is midline, symmetric expansion of the chest with respirations  Abdomen/: Soft, nontender, nondistended abdomen  Extremities: No bony deformities, joint swelling, joint erythema  Skin: No skin rashes, bruises, jaundice    Recent Labs      09/24/18 1722 09/24/18 2100 09/25/18   0128   WBC  19.8*  20.4*  19.6*   NEUTSPOLYS  85.20*  82.10*  84.10*   LYMPHOCYTES  4.50*  9.40*  7.40*   MONOCYTES  8.90  7.90  7.70   EOSINOPHILS  0.10  0.00  0.00   BASOPHILS  0.20  0.10  0.20   ASTSGOT  211*  306*  297*   ALTSGPT  180*  230*  204*   ALKPHOSPHAT  68  69  70   TBILIRUBIN  2.5*  1.7*  1.2     Recent Labs      09/24/18 1722 09/24/18 2100 09/25/18   0128   SODIUM  134*  131*  133*   POTASSIUM  4.1  4.8  4.3   CHLORIDE  96  96  95*   CO2  17*  15*  26   BUN  37*  38*  39*   CREATININE  1.67*  1.47*  1.40   CALCIUM  8.6  8.7  9.0     Recent Labs      09/24/18 1722 09/24/18 2100 09/25/18   0128   ALTSGPT  180*  230*  204*   ASTSGOT  211*  306*  297*   ALKPHOSPHAT  68  69  70   TBILIRUBIN  2.5*  1.7*  1.2   LIPASE  5*   --    --    GLUCOSE  370*  367*  366*           Invalid input(s): [id number]  DX-CHEST-PORTABLE (1 VIEW)   Final Result      No acute cardiac or pulmonary abnormalities are identified.      US-CAROTID DOPPLER BILAT   Final Result      US-EXTREMITY  VENOUS LOWER BILAT   Final Result      EC-ECHOCARDIOGRAM COMPLETE W/O CONT         DX-CHEST-LIMITED (1 VIEW)   Final Result      No acute cardiopulmonary disease.      DX-CHEST-PORTABLE (1 VIEW)    (Results Pending)   US-ABDOMEN COMPLETE SURVEY    (Results Pending)       Assessment & Plan        1. Anterior subacute STEMI with ischemic cardiomyopathy with ejection fraction 20%  -Status post cardiac catheterization  -Cardiology is following  -IABP in place  -CT surgery is following  for possible CABG in a.m.  -Aspirin  -IV heparin per ACS/IABP protocol    2.  Uncontrolled diabetes mellitus type 2  -IV insulin therapy per ICU protocol    3.  Acute kidney injury  -Controlled diabetes mellitus  -Cardiorenal syndrome  -IV fluids  -Monitoring renal function and urine output    4.  Elevated transaminases:  -?  Liver congestion/ischemic hepatitis  -History of alcohol abuse  -Daily monitoring liver function   -Thiamine and folate  -Supportive management    5.  Active smoking  -Nicotine replacement therapy with nicotine patches    6.  ICU prophylaxis  -Pepcid and SCDs    Family meeting: Not available at bedside    Critical care time: I spent 35 minutes personally providing critical care services including formulating plan of care.  This time was exclusive to this patient and does not include time spent in procedures.

## 2018-09-25 NOTE — ASSESSMENT & PLAN NOTE
Patient denies any abdominal pain.  He denies any heavy alcohol use and stated that his last alcohol intake was 2 days ago.  This could be due to hypoperfusion due to severely reduced cardiac functions secondary to extensive acute anterior MI.  We'll check abdominal ultrasound.  Avoid medications toxic to the liver (will hold statin).  Continue to monitor.

## 2018-09-25 NOTE — ASSESSMENT & PLAN NOTE
Severe secondary to acute extensive anterior MI.  Ejection fraction estimated to be 20%  CABG recommended following recovery  Bb, ARB, diuresis  AICD placed 9/26 - Dr Cole

## 2018-09-25 NOTE — PROGRESS NOTES
Dr. Gonda at bedside. Updated him on pt decreasing SBP.  Dr. Gonda examined pt with cardiac ultrasound probe.  Per Dr. Gonda no fluid bolus or pressors indicated at this time, RN to call if MAP drops less than 60.

## 2018-09-25 NOTE — PROGRESS NOTES
Dr. Carrera at bedside. Plan to pull IABP at 1600.  Updated Dr. Carrera on pt complaint of heart burn.  IABP rate decreased to 1:8.

## 2018-09-25 NOTE — PROGRESS NOTES
Paged Galina Taveras APRN to clarify heparin gtt, and inform her of aptt 136.4, orders received to start weight based protocol without initial bolus, d/c angiomax.  Stop heparin gtt at 0500.   Plan to prep patient for tenitive CABG at 0900.

## 2018-09-25 NOTE — PROGRESS NOTES
Cardiology Follow Up Progress Note    Date of Service  9/25/2018    Attending Physician  Ran Page M.D.    Chief Complaint   Chest pain    HPI  Nisa Ross is a 58 y.o. male admitted 9/24/2018 with an ST segment elevation myocardial infarction was found to have thrombosis of the LAD.  He was also in third-degree AV block and had a severe reduction of ejection fraction.  His chest pain had been going on for days.    Interim Events  Patient underwent cardiac catheterization.  He underwent thrombectomy and PCTA of the LAD.  The LAD was noted to be dissected.  He had CT of the RCA and 80% disease in the ramus intermedius.  An intra-aortic balloon pump was placed.  Cardiovascular surgery was consulted.  This morning a discussion was held with Dr. Hightower with myself.  He felt that the patient was too high risk and would have limited benefit from revascularization of the LAD.  Surgery was therefore canceled.  Patient is maintaining a adequate blood pressure off of pressor support and even when the intra-aortic balloon pump is turned off.    Review of Systems  Review of Systems   Respiratory: Negative for shortness of breath.    Cardiovascular: Negative for chest pain and palpitations.       Vital signs in last 24 hours  Temp:  [36.2 °C (97.1 °F)-37.3 °C (99.1 °F)] 37.2 °C (99 °F)  Pulse:  [] 81  Resp:  [10-31] 21  BP: (117)/(52) 117/52    Physical Exam  Physical Exam   Neck: No JVD present.   Cardiovascular: Normal rate and regular rhythm.  Exam reveals no gallop.    No murmur heard.  Pulmonary/Chest: Effort normal. He has no rales.   Abdominal: Soft. There is no tenderness.   Musculoskeletal: He exhibits no edema.       Lab Review  Lab Results   Component Value Date/Time    WBC 19.6 (H) 09/25/2018 01:28 AM    RBC 4.56 (L) 09/25/2018 01:28 AM    HEMOGLOBIN 14.1 09/25/2018 01:28 AM    HEMATOCRIT 41.2 (L) 09/25/2018 01:28 AM    MCV 90.4 09/25/2018 01:28 AM    MCH 30.9 09/25/2018 01:28 AM    MCHC 34.2  09/25/2018 01:28 AM    MPV 10.7 09/25/2018 01:28 AM      Lab Results   Component Value Date/Time    SODIUM 133 (L) 09/25/2018 01:28 AM    POTASSIUM 4.3 09/25/2018 01:28 AM    CHLORIDE 95 (L) 09/25/2018 01:28 AM    CO2 26 09/25/2018 01:28 AM    GLUCOSE 366 (H) 09/25/2018 01:28 AM    BUN 39 (H) 09/25/2018 01:28 AM    CREATININE 1.40 09/25/2018 01:28 AM      Lab Results   Component Value Date/Time    ASTSGOT 297 (H) 09/25/2018 01:28 AM    ALTSGPT 204 (H) 09/25/2018 01:28 AM     Lab Results   Component Value Date/Time    CHOLSTRLTOT 128 09/25/2018 01:28 AM    LDL 85 09/25/2018 01:28 AM    HDL 24 (A) 09/25/2018 01:28 AM    TRIGLYCERIDE 94 09/25/2018 01:28 AM    TROPONINI 43.85 (H) 09/25/2018 01:28 AM       Recent Labs      09/24/18   1722   BNPBTYPENAT  633*       Cardiac Imaging and Procedures Review  EKG:  My personal interpretation of the EKG dated 9/25 is ventricular paced with complete AV block and no ventricular escape and the pacemaker is turned off.    Echocardiogram:   CONCLUSIONS  No prior study is available for comparison.   Severely reduced left ventricular systolic function. Left ventricular   ejection fraction is visually estimated to be 15%.  Only preserved wall motion at the basal anterior wall segment.  No evidence of valvular abnormality based on Doppler evaluation.        KIRK FRITZ  Exam Date:         09/24/2018     Cardiac Catheterization:    ANGIOGRAM:  LEFT MAIN CORONARY ARTERY:  Left main coronary artery is a moderate length   large caliber vessel free of disease.  LEFT ANTERIOR DESCENDING ARTERY:  Left anterior descending artery is a long,   mild-to-moderate caliber vessel which wraps around the apex.  It is occluded   at the ostium.  Proximal to mid portion of the vessel with a long area of   dissection.  There is an early originating large diagonal branch with proximal   concentric 90% stenosis.  RAMUS INTERMEDIUS:  The ramus intermedius is a very large caliber vessel with   ostial  concentric 80% stenosis.  LEFT CIRCUMFLEX ARTERY:  Left circumflex artery is a nondominant small caliber   vessel with small obtuse marginal branches noted free of disease.  RIGHT CORONARY ARTERY:  Right coronary artery is a dominant vessel which is   occluded at the ostial portion.  Distally posterior descending artery and   posterior lateral branches fills via left to right collaterals.     PERCUTANEOUS INTERVENTION:   1. Thrombectomy with Priority One device.    PTCA with 2.5x20 mm Emerge balloon.    2. Intraaortic balloon pump placemen.  3. Temporary pacemaker balloon.     RECOMMENDATIONS:  Recommend medical stabilization and consideration for   coronary artery bypass graft surgery.        Assessment/Plan  * STEMI (ST elevation myocardial infarction) ([facility])   Assessment & Plan    Patient continues to maintain adequate blood pressure off of pressor therapy.  He is now on a 1:4 rate on his IABP.  He continues to maintain an adequate blood pressure, we will discontinue the intra-aortic balloon pump this afternoon.  Cardiac surgery has been canceled as noted previously.  Is not felt that the patient would benefit from intervention to the LAD.  The RCA is totally occluded chronically.  Patient will be treated medically.        Complete heart block by electrocardiogram ([facility])   Assessment & Plan    Patient continues to be in complete heart block.  He will require permanent pacemaker placement.  We will defer this until tomorrow.  We will also discuss with EP.        Ischemic cardiomyopathy- (present on admission)   Assessment & Plan    We will increase medical therapy as tolerated once his intra-aortic balloon pump has been discontinued.            Thank you for allowing me to participate in the care of this patient.  I will continue to follow this patient    Please contact me with any questions.    Russel Carrera M.D.   Cardiologist, [facility]  [phone]

## 2018-09-25 NOTE — PROGRESS NOTES
Paged and updated Galina TALLEY regarding EKG changes, lab results CO2 15 Anion Gap 20, emesis x2, requested PMA consult from hospitalist and hospitalist to put in orders for DKA protocol.  No new orders received at this time.  Will continue to monitor.

## 2018-09-25 NOTE — PROGRESS NOTES
2150: Hospitalist paged and updated regarding pt's nausea, hiccups, and complaints of heartburn.  MD to put in orders for PRN medications.    2210: Hospitalist at bedside to assess pt, pt had one episode of emesis prior to PRN orders.  MD to put in orders for phenergan.      2300: Pt medicated and states some relief from medication. Pt resting comfortably at this time.

## 2018-09-25 NOTE — ASSESSMENT & PLAN NOTE
S/P angioplasty with thrombectomy of the LAD on 9/24  No stents were placed  Continue aspirin  Continue metoprolol SR, 50 mg daily  Resume statin

## 2018-09-25 NOTE — ASSESSMENT & PLAN NOTE
Likely cholestasis.  No right upper quadrant pain on exam.  Follow-up with abdominal ultrasound.  Continue to monitor.

## 2018-09-25 NOTE — H&P
Hospital Medicine History & Physical Note    Date of Service  9/24/2018    Primary Care Physician  Pcp Not In Computer    Consultants  Cardiology  Cardiothoracic surgery  Critical care    Code Status  Full    Chief Complaint  Chest pain    History of Presenting Illness  58 y.o. male with past medical history of type II diabetes mellitus, hypertension, and tobacco abuse who was transferred from [place] for acute MI and third-degree AV block. Patient has been having chest pain associated with dyspnea and dizziness over the last 2 days. He was evaluated at a local hospital in [place] was found to have troponin is greater than 20. EKG showed 3rd degree AV block and anterior ST elevation. Patient was taken emergently to cath lab.    Review of Systems  Review of Systems   Constitutional: Positive for malaise/fatigue. Negative for chills and fever.   HENT: Negative for ear pain, hearing loss and tinnitus.    Eyes: Negative for blurred vision, double vision and photophobia.   Respiratory: Negative for cough and hemoptysis.    Cardiovascular: Positive for chest pain. Negative for palpitations and PND.   Gastrointestinal: Negative for heartburn, nausea and vomiting.   Genitourinary: Negative for dysuria, frequency and urgency.   Musculoskeletal: Negative for myalgias and neck pain.   Skin: Negative for rash.   Neurological: Positive for dizziness. Negative for tingling and headaches.   Psychiatric/Behavioral: Negative for depression and suicidal ideas.       Past Medical History   has a past medical history of Acute coronary syndrome ([facility]) (9/24/2018); Complete heart block by electrocardiogram ([facility]) (9/24/2018); Diabetes; DM (diabetes mellitus) ([facility]) (9/24/2018); HTN (hypertension), malignant (9/24/2018); Rheumatic fever; Smoking (9/24/2018); and STEMI (ST elevation myocardial infarction) ([facility]) (9/24/2018).    Surgical History   has a past surgical history that includes other orthopedic surgery (1975) and cervical disk  and fusion anterior (4/23/2010).     Family History  Family history was reviewed and found noncontributory.    Social History   reports that he has been smoking.  He does not have any smokeless tobacco history on file. He reports that he drinks alcohol. He reports that he does not use drugs.    Allergies  No Known Allergies    Medications  Prior to Admission Medications   Prescriptions Last Dose Informant Patient Reported? Taking?   Oxycodone-Acetaminophen (PERCOCET)  MG TABS   Yes No   Sig: Take 1-2 Tabs by mouth every four hours as needed for Moderate Pain. If unable to take may take lortab elixir as written    Propoxyphene N-APAP (DARVOCET-N 50 PO) 4/15/2010  Yes No   Sig: Take  by mouth.   carisoprodol (SOMA) 350 MG TABS 4/22/2010  1500  Yes No   Sig: Take 350 mg by mouth 4 times a day.   gabapentin (NEURONTIN) 300 MG CAPS 4/22/2010 1900  Yes No   Sig: Take 300 mg by mouth 3 times a day.   glyBURIDE (DIABETA) 5 MG TABS 4/22/2010 1900  Yes No   Sig: Take 5 mg by mouth 2 Times a Day.   hydrocodone-acetaminophen 2.5-167 mg/5 mL solution (LORTAB) 7.5-500 MG/15ML SOLN   Yes No   Sig: Take 15 mL by mouth 4 times a day as needed for Mild Pain.   metformin (GLUCOPHAGE) 1000 MG tablet 4/22/2010 1900  Yes No   Sig: Take 1,000 mg by mouth 2 times a day, with meals.   pioglitazone (ACTOS) 45 MG TABS 4/22/2010  1000  Yes No   Sig: Take 45 mg by mouth every day.   pravastatin (PRAVACHOL) 40 MG tablet 4/22/2010 1900  Yes No   Sig: Take 40 mg by mouth every evening.      Facility-Administered Medications: None       Physical Exam  Temp:  [36.2 °C (97.1 °F)-37.3 °C (99.1 °F)] 37.3 °C (99.1 °F)  Pulse:  [] 76  Resp:  [10-20] 10  BP: (117)/(52) 117/52    Physical Exam   Constitutional: He is oriented to person, place, and time. He appears well-developed. No distress.   HENT:   Head: Normocephalic and atraumatic.   Mouth/Throat: No oropharyngeal exudate.   Eyes: Pupils are equal, round, and reactive to light.  Conjunctivae are normal. No scleral icterus.   Neck: Neck supple. No tracheal deviation present. No thyromegaly present.   Cardiovascular: Normal rate.  A regularly irregular rhythm present. Exam reveals no gallop and no friction rub.    Murmur heard.  Pulmonary/Chest: Breath sounds normal. No respiratory distress. He has no wheezes.   Abdominal: Soft. He exhibits no distension. There is no tenderness.   Musculoskeletal: Normal range of motion. He exhibits no tenderness or deformity.   Neurological: He is alert and oriented to person, place, and time. No cranial nerve deficit.   Skin: Skin is dry. He is not diaphoretic. No erythema.   Psychiatric: He has a normal mood and affect. His behavior is normal.       Laboratory:  Recent Labs      09/24/18 1722 09/24/18 2100 09/25/18 0128   WBC  19.8*  20.4*  19.6*   RBC  4.58*  4.66*  4.56*   HEMOGLOBIN  14.3  14.2  14.1   HEMATOCRIT  43.2  42.7  41.2*   MCV  94.3  91.6  90.4   MCH  31.2  30.5  30.9   MCHC  33.1*  33.3*  34.2   RDW  48.1  47.3  45.8   PLATELETCT  156*  154*  165   MPV  10.9  10.8  10.7     Recent Labs      09/24/18 1722 09/24/18 2100 09/25/18   0128   SODIUM  134*  131*  133*   POTASSIUM  4.1  4.8  4.3   CHLORIDE  96  96  95*   CO2  17*  15*  26   GLUCOSE  370*  367*  366*   BUN  37*  38*  39*   CREATININE  1.67*  1.47*  1.40   CALCIUM  8.6  8.7  9.0     Recent Labs      09/24/18 1722 09/24/18 2100 09/25/18   0128   ALTSGPT  180*  230*  204*   ASTSGOT  211*  306*  297*   ALKPHOSPHAT  68  69  70   TBILIRUBIN  2.5*  1.7*  1.2   LIPASE  5*   --    --    GLUCOSE  370*  367*  366*     Recent Labs      09/24/18 1722 09/24/18 2100   APTT  29.3  136.4*   INR  1.34*  2.49*     Recent Labs      09/24/18 1722   BNPBTYPENAT  633*     Recent Labs      09/25/18   0128   TRIGLYCERIDE  94   HDL  24*   LDL  85     Recent Labs      09/24/18   1722  09/24/18   2100   TROPONINI  32.13*  41.05*       Urinalysis:    Recent Labs      09/24/18   2207    SPECGRAVITY  >=1.045*   GLUCOSEUR  >=1000*   KETONES  15*   NITRITE  Negative   LEUKESTERAS  Negative   WBCURINE  0-2*   RBCURINE  2-5*   BACTERIA  Negative   EPITHELCELL  Rare        Imaging:  DX-CHEST-PORTABLE (1 VIEW)   Final Result      No acute cardiac or pulmonary abnormalities are identified.      US-CAROTID DOPPLER BILAT   Final Result      US-EXTREMITY VENOUS LOWER BILAT   Final Result      EC-ECHOCARDIOGRAM COMPLETE W/O CONT         DX-CHEST-LIMITED (1 VIEW)   Final Result      No acute cardiopulmonary disease.      DX-CHEST-PORTABLE (1 VIEW)    (Results Pending)   US-ABDOMEN COMPLETE SURVEY    (Results Pending)         Assessment/Plan:  I anticipate this patient will require at least two midnights for appropriate medical management, necessitating inpatient admission.    * STEMI (ST elevation myocardial infarction) ([facility])   Assessment & Plan    Acute anterior MI.  Status post cardiac cath that showed multi-vessel severe coronary artery disease.  CABG recommended.  Continue with heparin drip.  Cardiology and cardiothoracic following.        Diabetic ketoacidosis associated with type 2 diabetes mellitus (HCC)- (present on admission)   Assessment & Plan    Start on IV insulin drip per DKA protocol.  Critical care following.        Complete heart block by electrocardiogram ([facility])   Assessment & Plan    Third-degree AV block on EKG.  Likely secondary to acute anterior MI.  Cardiology following.  Poor prognosis.        High anion gap metabolic acidosis- (present on admission)   Assessment & Plan    Secondary to DKA.  On DKA protocol with insulin drip and IV fluids.        Acute-on-chronic kidney injury (HCC)- (present on admission)   Assessment & Plan    Unknown creatinine baseline but likely this patient has chronic kidney disease secondary to poorly controlled diabetes.  Avoid nephrotoxins.  Renal dose all medications.        Hyponatremia- (present on admission)   Assessment & Plan    Secondary to kidney  failure and dehydration.  Continue to monitor.        Leukocytosis- (present on admission)   Assessment & Plan    UA was negative for UTI.  Chest x-ray with no acute infiltrate.  Condition distress induced.  Cultures obtained.  Continue to follow.        Elevated LFTs- (present on admission)   Assessment & Plan    Patient denies any abdominal pain.  He denies any heavy alcohol use and stated that his last alcohol intake was 2 days ago.  This could be due to hypoperfusion due to severely reduced cardiac functions secondary to extensive acute anterior MI.  We'll check abdominal ultrasound.  Avoid medications toxic to the liver (will hold statin).  Continue to monitor.        Ischemic cardiomyopathy- (present on admission)   Assessment & Plan    Severe secondary to acute extensive anterior MI.  Ejection fraction estimated to be 20% by cardiac cath.  CABG recommended.  Overall prognosis guarded.        Hyperbilirubinemia- (present on admission)   Assessment & Plan    Likely cholestasis.  No right upper quadrant pain on exam.  Follow-up with abdominal ultrasound.  Continue to monitor.        HTN (hypertension), malignant   Assessment & Plan    Poorly controlled.  Monitor blood pressure closely.        Type 2 diabetes mellitus (HCC)   Assessment & Plan    With DKA now.  Check hemoglobin A1c.  On insulin drip.        Smoking   Assessment & Plan    Counseling provided.  Nicotine patch available as needed.            VTE prophylaxis: Heparin.

## 2018-09-25 NOTE — PROGRESS NOTES
Patient seen in cath lab  Chart/films reviewed with Dr. Henderson    Patient placed tentatively on schedule in AM at 0900 for CABG    We will re-evaluate the patient in the AM before proceeding with any definitive plan.    Pre-op orders placed.  Continue current care per cardiology.    Please see consult note from Dr. Hightower for details.

## 2018-09-25 NOTE — PROGRESS NOTES
Case reviewed by Dr. Hightower with Dr. Carrera.  Patient too high of surgical risk.    CABG canceled.  To proceed with optimizing medical care per Cardiology and rounding teams.

## 2018-09-25 NOTE — CARE PLAN
Problem: Pre Op  Goal: Optimal preparation for CABG/Heart Valve surgery    Intervention: Consents obtained for Surgery, Anesthesia and Transfusion/Bloodless Program Participant  Printed, to be signed w/ day RN  Intervention: Pre op checklist completed. Admit profile completed. Advanced directive verified.  Admit profile complete, pre op checklist to be completed by Day RN  Intervention: Pre op labs per MD order: CBC, CMP, INR, PTT, COD if not done in past 72 hours.  HbA1C if diabetic.  complete  Intervention: Pre op diagnostics per MD order (EKG, CXR, Bilateral carotid doppler study and vein mapping)  complete  Intervention: Baseline assessment documented to include IS volume, weight, bilateral BP and peripheral pulses.  complete  Intervention: NPO at midnight except cardiac medications. (No ASA, coumadin or Plavix)  complete  Intervention: Shower with chlorhexidine x 2  complete  Intervention: Prep with clippers  complete  Intervention: Remove dentures, valuables and jewelry  n/a  Intervention: Determine if patient is taking Beta Blockers  contraindicated  Intervention: Cardiac/BP meds and Mupirocin ointment given prior to surgery. Verify orders for hold or administer of anticoags.  Complete heparin gtt held at 0500  Intervention: If diabetic, administer insulin night before surgery  Critical care insulin protocol infusion    Intervention: If diabetic, FSBS in am and follow sliding scale if indicated  Critical care insulin infusion protocol in place

## 2018-09-25 NOTE — PROGRESS NOTES
Bedside report received from Pedro SYED.  IABP setting verified, lines verified, ggts verified.  Pt is AOx4, no family at bedside.  Discussed pt condition and POC. Questions answered.

## 2018-09-25 NOTE — ED PROVIDER NOTES
ED Provider Note    CHIEF COMPLAINT  Chief Complaint   Patient presents with   • Shortness of Breath   • Weakness   • N/V   • Dysrhythmia       HPI  Nisa Ross is a 58 y.o. male who presents via transfer for evaluation of third-degree heart block associated with dyspnea, treated with Zosyn and IV fluids at the outside facility prior to transfer and was found to have a troponin greater than 20.  The patient states that this morning he had acute onset of shortness of breath and heartburn, he tried to stand up out of bed and syncopized.  He states that this never happened before.  The EKG at the outside facility revealed a third-degree heart block, he was treated with 2 doses of atropine with no improvement in his heart rate.  Patient's history of diabetes, no known coronary disease, he states he has no other medical problems.  He states during onset of his symptoms he was sweaty and nauseated    REVIEW OF SYSTEMS  Negative for fever, rash, abdominal pain, diarrhea, headache, focal weakness, focal numbness, focal tingling, back pain. All other systems are negative.     PAST MEDICAL HISTORY  Past Medical History:   Diagnosis Date   • Diabetes     type II   • Rheumatic fever     1974       FAMILY HISTORY  No family history on file.    SOCIAL HISTORY  Social History   Substance Use Topics   • Smoking status: Current Every Day Smoker   • Smokeless tobacco: Not on file      Comment: 1 pack isabel day and a half   • Alcohol use Yes      Comment: socially       SURGICAL HISTORY  Past Surgical History:   Procedure Laterality Date   • CERVICAL DISK AND FUSION ANTERIOR  4/23/2010    Performed by ESTHER BELL at SURGERY [facility] ORS   • OTHER ORTHOPEDIC SURGERY  1975    right leg        CURRENT MEDICATIONS  I personally reviewed the medication list in the charting documentation.     ALLERGIES  No Known Allergies    MEDICAL RECORD  I have reviewed patient's medical record and pertinent results are listed  "above.      PHYSICAL EXAM  VITAL SIGNS: /52   Pulse (!) 51   Temp 36.8 °C (98.2 °F)   Resp 18   Ht 1.778 m (5' 10\")   Wt 74.8 kg (165 lb)   SpO2 100%   BMI 23.68 kg/m²    Constitutional: Acutely ill-appearing  HENT: Mucus membranes moist.    Eyes: No scleral icterus. Normal conjunctiva   Neck: Supple, comfortable, nonpainful range of motion.   Cardiovascular: Bradycardic but regular  Thorax & Lungs: Chest is nontender.  Lungs are clear to auscultation with good air movement bilaterally.  No wheeze, rhonchi, nor rales.   Abdomen: Soft, with no tenderness, rebound nor guarding.  No mass or pulsatile mass appreciated.  Skin: Warm, dry. No rash appreciated  Extremities/Musculoskeletal: No sign of trauma. No asymmetric calf tenderness, erythema or edema. Normal range of motion   Neurologic: Alert & oriented. No focal deficits observed.   Psychiatric: Normal affect appropriate for the clinical situation.    DIAGNOSTIC STUDIES / PROCEDURES    EKG  12 Lead EKG interpreted by me to show:    Rate 52  Rhythm: Third-degree heart block  Axis: Left  QRS Intervals: Widened  T waves: No acute changes  ST segments: ST segment elevation in V2, V3, ST segment depression 2, 3  Ectopy: None.    My impression of this EKG: STEMI with third-degree heart block      LABS  Results for orders placed or performed during the hospital encounter of 09/24/18   Troponin   Result Value Ref Range    Troponin I 32.13 (H) 0.00 - 0.04 ng/mL   Btype Natriuretic Peptide   Result Value Ref Range    B Natriuretic Peptide 633 (H) 0 - 100 pg/mL   CBC with Differential   Result Value Ref Range    WBC 19.8 (H) 4.8 - 10.8 K/uL    RBC 4.58 (L) 4.70 - 6.10 M/uL    Hemoglobin 14.3 14.0 - 18.0 g/dL    Hematocrit 43.2 42.0 - 52.0 %    MCV 94.3 81.4 - 97.8 fL    MCH 31.2 27.0 - 33.0 pg    MCHC 33.1 (L) 33.7 - 35.3 g/dL    RDW 48.1 35.9 - 50.0 fL    Platelet Count 156 (L) 164 - 446 K/uL    MPV 10.9 9.0 - 12.9 fL    Neutrophils-Polys 85.20 (H) 44.00 - 72.00 " %    Lymphocytes 4.50 (L) 22.00 - 41.00 %    Monocytes 8.90 0.00 - 13.40 %    Eosinophils 0.10 0.00 - 6.90 %    Basophils 0.20 0.00 - 1.80 %    Immature Granulocytes 1.10 (H) 0.00 - 0.90 %    Nucleated RBC 0.00 /100 WBC    Neutrophils (Absolute) 16.91 (H) 1.82 - 7.42 K/uL    Lymphs (Absolute) 0.89 (L) 1.00 - 4.80 K/uL    Monos (Absolute) 1.76 (H) 0.00 - 0.85 K/uL    Eos (Absolute) 0.01 0.00 - 0.51 K/uL    Baso (Absolute) 0.04 0.00 - 0.12 K/uL    Immature Granulocytes (abs) 0.22 (H) 0.00 - 0.11 K/uL    NRBC (Absolute) 0.00 K/uL   Complete Metabolic Panel (CMP)   Result Value Ref Range    Sodium 134 (L) 135 - 145 mmol/L    Potassium 4.1 3.6 - 5.5 mmol/L    Chloride 96 96 - 112 mmol/L    Co2 17 (L) 20 - 33 mmol/L    Anion Gap 21.0 (H) 0.0 - 11.9    Glucose 370 (H) 65 - 99 mg/dL    Bun 37 (H) 8 - 22 mg/dL    Creatinine 1.67 (H) 0.50 - 1.40 mg/dL    Calcium 8.6 8.5 - 10.5 mg/dL    AST(SGOT) 211 (H) 12 - 45 U/L    ALT(SGPT) 180 (H) 2 - 50 U/L    Alkaline Phosphatase 68 30 - 99 U/L    Total Bilirubin 2.5 (H) 0.1 - 1.5 mg/dL    Albumin 3.5 3.2 - 4.9 g/dL    Total Protein 6.1 6.0 - 8.2 g/dL    Globulin 2.6 1.9 - 3.5 g/dL    A-G Ratio 1.3 g/dL   Prothrombin Time   Result Value Ref Range    PT 16.7 (H) 12.0 - 14.6 sec    INR 1.34 (H) 0.87 - 1.13   APTT   Result Value Ref Range    APTT 29.3 24.7 - 36.0 sec   Lipase   Result Value Ref Range    Lipase 5 (L) 11 - 82 U/L   ESTIMATED GFR   Result Value Ref Range    GFR If  51 (A) >60 mL/min/1.73 m 2    GFR If Non  42 (A) >60 mL/min/1.73 m 2   CBC with Differential   Result Value Ref Range    WBC 20.4 (H) 4.8 - 10.8 K/uL    RBC 4.66 (L) 4.70 - 6.10 M/uL    Hemoglobin 14.2 14.0 - 18.0 g/dL    Hematocrit 42.7 42.0 - 52.0 %    MCV 91.6 81.4 - 97.8 fL    MCH 30.5 27.0 - 33.0 pg    MCHC 33.3 (L) 33.7 - 35.3 g/dL    RDW 47.3 35.9 - 50.0 fL    Platelet Count 154 (L) 164 - 446 K/uL    MPV 10.8 9.0 - 12.9 fL    Neutrophils-Polys 82.10 (H) 44.00 - 72.00 %     "Lymphocytes 9.40 (L) 22.00 - 41.00 %    Monocytes 7.90 0.00 - 13.40 %    Eosinophils 0.00 0.00 - 6.90 %    Basophils 0.10 0.00 - 1.80 %    Immature Granulocytes 0.50 0.00 - 0.90 %    Nucleated RBC 0.00 /100 WBC    Neutrophils (Absolute) 16.72 (H) 1.82 - 7.42 K/uL    Lymphs (Absolute) 1.92 1.00 - 4.80 K/uL    Monos (Absolute) 1.61 (H) 0.00 - 0.85 K/uL    Eos (Absolute) 0.01 0.00 - 0.51 K/uL    Baso (Absolute) 0.03 0.00 - 0.12 K/uL    Immature Granulocytes (abs) 0.10 0.00 - 0.11 K/uL    NRBC (Absolute) 0.00 K/uL   Prothrombin time (INR)   Result Value Ref Range    PT 27.0 (H) 12.0 - 14.6 sec    INR 2.49 (H) 0.87 - 1.13   APTT (PTT)   Result Value Ref Range    APTT 136.4 (HH) 24.7 - 36.0 sec   COD (Adult)   Result Value Ref Range    ABO Grouping Only A     Rh Grouping Only POS     Antibody Screen-Cod NEG      RADIOLOGY  DX-CHEST-LIMITED (1 VIEW)   Final Result      No acute cardiopulmonary disease.            COURSE & MEDICAL DECISION MAKING  I have reviewed any medical record information, laboratory studies and radiographic results as noted above.    Encounter Summary: This is a 58 y.o. male who was transferred here with a diagnosis of third-degree heart block, found to have STEMI upon arrival, STEMI alert was called, the patient denies chest pain but reports having \"heartburn\" as well as dyspnea, initially was diaphoretic and nauseated as well.  His troponin at the outside facility was greater than 20, he was treated unsuccessfully with atropine and ultimately treated with Zosyn and sent here.  Dr. Page, cardiologist responded to the STEMI alert, had extensive conversation with the patient regarding risks and benefits of invasive treatment versus conservative management and the patient elected to proceed with invasive management.  Patient was ultimately brought to the Cath Lab.  He is in critical condition.  Blood work will be repeated to trend the troponin, chest x-ray reveals no obvious acute " abnormalities    CRITICAL CARE  The very real possibilty of a deterioration of this patient's condition required the highest level of my preparedness for sudden, emergent intervention.  I provided critical care services, which included medication orders, frequent reevaluations of the patient's condition and response to treatment, ordering and reviewing test results, and discussing the case with various consultants.  The critical care time associated with the care of the patient was 39 minutes. Review chart for interventions. This time is exclusive of any other billable procedures.         DISPOSITION: Admit in guarded condition      FINAL IMPRESSION  1. ST elevation myocardial infarction (STEMI), unspecified artery (HCC)    2. Complete heart block (HCC)           This dictation was created using voice recognition software. The accuracy of the dictation is limited to the abilities of the software. I expect there may be some errors of grammar and possibly content. The nursing notes were reviewed and certain aspects of this information were incorporated into this note.    Electronically signed by: Ricardo Dexter, 9/24/2018 5:55 PM

## 2018-09-25 NOTE — CONSULTS
Cardiology Initial Consultation    Date of Service  9/24/2018    Referring Physician  Ricardo Dexter M.D.    Reason for Consultation  STEMI eactivation    History of Presenting Illness  Nisa Ross is a 58 y.o. male with a past medical history of DM, HTN, smoking, who presented 9/24/2018 with light headedness and chest pain. He was found to have troponin of 22 at OSH, also found to be in complete heart block. Active chest pain.    EKG showed ST elevation anteriority with complete heart block. Baseline RBBB.    Review of Systems  Review of Systems   Constitutional: Negative for chills and fever.   HENT: Negative for ear discharge, ear pain, hearing loss and nosebleeds.    Eyes: Negative for pain and discharge.   Respiratory: Negative for cough and shortness of breath.    Cardiovascular: Positive for chest pain. Negative for palpitations and leg swelling.   Gastrointestinal: Negative for abdominal pain, blood in stool, nausea and vomiting.   Genitourinary: Negative for dysuria and hematuria.   Musculoskeletal: Negative for myalgias.   Skin: Negative for rash.   Allergic/Immunologic: Negative for environmental allergies.   Neurological: Negative for dizziness and headaches.   Hematological: Does not bruise/bleed easily.   Psychiatric/Behavioral: Negative for hallucinations and suicidal ideas.       Past Medical History   has a past medical history of Diabetes and Rheumatic fever.    Surgical History   has a past surgical history that includes other orthopedic surgery (1975) and cervical disk and fusion anterior (4/23/2010).    Family History  family history is not on file.    Social History   reports that he has been smoking.  He does not have any smokeless tobacco history on file. He reports that he drinks alcohol. He reports that he does not use drugs.    Medications  Prior to Admission Medications   Prescriptions Last Dose Informant Patient Reported? Taking?   Oxycodone-Acetaminophen (PERCOCET)  MG  TABS   Yes No   Sig: Take 1-2 Tabs by mouth every four hours as needed for Moderate Pain. If unable to take may take lortab elixir as written    Propoxyphene N-APAP (DARVOCET-N 50 PO) 4/15/2010  Yes No   Sig: Take  by mouth.   carisoprodol (SOMA) 350 MG TABS 4/22/2010  1500  Yes No   Sig: Take 350 mg by mouth 4 times a day.   gabapentin (NEURONTIN) 300 MG CAPS 4/22/2010 1900  Yes No   Sig: Take 300 mg by mouth 3 times a day.   glyBURIDE (DIABETA) 5 MG TABS 4/22/2010 1900  Yes No   Sig: Take 5 mg by mouth 2 Times a Day.   hydrocodone-acetaminophen 2.5-167 mg/5 mL solution (LORTAB) 7.5-500 MG/15ML SOLN   Yes No   Sig: Take 15 mL by mouth 4 times a day as needed for Mild Pain.   metformin (GLUCOPHAGE) 1000 MG tablet 4/22/2010 1900  Yes No   Sig: Take 1,000 mg by mouth 2 times a day, with meals.   pioglitazone (ACTOS) 45 MG TABS 4/22/2010  1000  Yes No   Sig: Take 45 mg by mouth every day.   pravastatin (PRAVACHOL) 40 MG tablet 4/22/2010 1900  Yes No   Sig: Take 40 mg by mouth every evening.      Facility-Administered Medications: None       Allergies  No Known Allergies    Vital signs in last 24 hours  Temp:  [36.8 °C (98.2 °F)] 36.8 °C (98.2 °F)  Pulse:  [51] 51  Resp:  [18] 18  BP: (117)/(52) 117/52    Physical Exam  Physical Exam   Constitutional: He is oriented to person, place, and time. He appears distressed.   Not well kept   HENT:   Head: Normocephalic and atraumatic.   Eyes: EOM are normal.   Neck: Normal range of motion. No JVD present.   Cardiovascular: Normal rate, regular rhythm, normal heart sounds and intact distal pulses.  Exam reveals no gallop and no friction rub.    No murmur heard.  Bilateral femoral pulses are 2+, bilateral dorsalis pedis pulses are 2+, bilateral posterior tibialis pulses are 2+.   Pulmonary/Chest: No respiratory distress. He has no wheezes. He has no rales. He exhibits no tenderness.   Abdominal: Soft. Bowel sounds are normal. There is no tenderness. There is no rebound and no  guarding.   The is no presence of abdominal bruits   Musculoskeletal: Normal range of motion. He exhibits no edema or tenderness.   Neurological: He is alert and oriented to person, place, and time.   Skin: Skin is warm and dry.   Psychiatric: He has a normal mood and affect.   Nursing note and vitals reviewed.      Lab Review  No results found for: WBC, RBC, HEMOGLOBIN, HEMATOCRIT, MCV, MCH, MCHC, MPV   No results found for: SODIUM, POTASSIUM, CHLORIDE, CO2, GLUCOSE, BUN, CREATININE, BUNCREATRAT, GLOMRATE   No results found for: ASTSGOT, ALTSGPT  No results found for: CHOLSTRLTOT, LDL, HDL, TRIGLYCERIDE, TROPONINI          Cardiac Imaging and Procedures Review  EKG:  As in HPI    Echocardiogram:  NA    Cardiac Catheterization:  Will be done emergently.    Imaging  Chest X-Ray:  NA     Stress Test:  None    Assessment/Plan  STEMI  Complete heart block.  DM  HTN  Smoking    Patient is having active acute coronary syndrome with complete heart block.  Will need to take to cath lab emergently for diagnosis and revascularization.    Patient is critically ill.   The patient continues to have: chest pain  The vital organ system that is affected is the: heart  If untreated there is a high chance of deterioration into: cardiac arrest  And eventually death.   The critical care that I am providing today is: 45 mins  The critical that has been undertaken is medically complex.   There has been no overlap in critical care time.   Critical Care Time not including procedures: 45 mins      Thank you for allowing me to participate in the care of this patient.    Please contact me with any questions.    Ran Page M.D.   Cardiologist, [facility]  [phone]

## 2018-09-25 NOTE — ASSESSMENT & PLAN NOTE
Patient is maintaining an adequate blood pressure in spite of his severe ischemic cardiomyopathy.  We will continue increasing his medical therapy has tolerated.  He appears stable for telemetry at this time.

## 2018-09-25 NOTE — CARE PLAN
Problem: Communication  Goal: The ability to communicate needs accurately and effectively will improve    Intervention: [facility] patient and significant other/support system to call light to alert staff of needs  Pt AOx4   Intervention: Reorient patient to environment as needed  done      Problem: Safety  Goal: Will remain free from injury    Intervention: Provide assistance with mobility  bedrest    Goal: Will remain free from falls    Intervention: Assess risk factors for falls  documented

## 2018-09-25 NOTE — PROGRESS NOTES
Dr. Hightower and Dr. Carrera at bedside.  Per Dr. Hightower pt not a candidate for surgery at this time, will need ppm.  Per Dr. Carrera IABP rate decreased to 1:4, plan to DC this afternoon.  Discussed pt vitals, EKG, and POC.  Questions answered.

## 2018-09-25 NOTE — ASSESSMENT & PLAN NOTE
Patient is post AICD placement.  Again, a left ventricular lead was unable to be placed.  No significant pericardial effusion noted on echocardiography.  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  He has had return of AV conduction and has not needed a significant amount of pacing.

## 2018-09-25 NOTE — ASSESSMENT & PLAN NOTE
Acute anterior MI.  Status post cardiac cath that showed multi-vessel severe coronary artery disease.  CABG recommended but medical management for 4-6 weeks before considering intervention.  Warfarin dosing per pharmacy, INR subtherapeutic.  Cardiology following.  Bb, Asa, statin, ARB

## 2018-09-25 NOTE — ASSESSMENT & PLAN NOTE
Again, we will increase his beta-blocker and ACE inhibitor therapy today.  Prior to discharge, will need to decide whether or not to proceed to intervention on the large diagonal and large ramus intermedius arteries which both have significant proximal lesions.

## 2018-09-25 NOTE — CONSULTS
DATE OF SERVICE:  09/24/2018    CHIEF COMPLAINT:  Heart attack and chest pain for 3-1/2 to 4 days.    HISTORY OF PRESENT ILLNESS:  This is a 58-year-old gentleman with history of   diabetes, hypertension, smokes 1 pack of cigarettes a day, drinks a   12-pack of beer every few days.  Patient developed lightheadedness and severe chest pain and arm pain for 4 days   at his home in [place].  He decided to seek help after 4 days and was found to have a   troponin of 22, which then aki to the mid 30s. was in complete heart block   with active chest pain.  This was evaluated in [place] and then transferred   to [facility], complete heart block, baseline right   bundle-branch block.    Heart catheterization by Dr. Henderson shows 100% occluded LAD with clot thrombosis of the entire artery.  He   tried to open this, but the entire LAD is clotted with thrombus and dissection   within the lumen.  The ramus and the diagonal have 80% lesions.  His right is   chronically occluded with a small PDA seen from  collateral flow from the   left.  His EF is estimated by eyeball 20% according to Dr. Henderson. Echo later shows this at 15%    PAST MEDICAL HISTORY:  History of hypertension, diabetes, rheumatic fever.    PAST SURGICAL HISTORY:  History orthopedic surgery and cervical disk fusion,   anterior.    SOCIAL HISTORY:  Not .  Has a brother.  Lives in [place].  He is a   heavy smoker and moderate-to-heavy alcohol usage.    FAMILY HISTORY:  Noncontributory to premature coronary artery disease.    MEDICATIONS:  Medication list is extensive.  It is listed in the admission   physical.  He is on at least 2 types of narcotic medications for chronic pain   in addition to a full array of medications for diabetes and hypertension.    ALLERGIES:  None.    REVIEW OF SYSTEMS:  Negative except for that mentioned above.  Discussed with   the patient extensively.    PHYSICAL EXAMINATION:  VITAL SIGNS:  Pulses  60-70, respirations 18, in no respiratory distress.    Blood pressure 117/52. Pain free no chest pain with IABP in place.  GENERAL:  He is actually awake and alert.  He has no chest pain at this time.   After balloon pump was placed, his chest pain went from 5 to 3 and now when   we are talking to him it is close to 1 or 0.  HEENT:  Normocephalic.  Good airway.  NEUROLOGIC:  Moving all 4 extremities, answers questions.  CARDIAC:  S1, S2.  No S3.  No murmur.  PULMONARY:  Rales, course rhonchi in all lung fields.  ABDOMEN:  Obese, but scaphoid, large.  No organosplenomegaly.  GENITOURINARY AND RECTAL:  Deferred.  EXTREMITIES:  Normal range of motion, although limited because a balloon pump   and catheter placements.  SKIN:  Appears to be clear.  STERNUM:  Appears to be clear.  ENDOCRINE:  No thyromegaly.  PSYCHIATRIC:  Affect appropriate.    IMPRESSION:  1.  A 3 to 4-day old anterior myocardial infarction, thrombosed LAD, may not   be able to be repaired and as no target for LAD bypass from clot, significant other anterior vessel disease, old   posterior myocardial infarction, chest pain, currently controlled with balloon   pump.  2.  Heavy cigarette smoking, 1-2 packs of cigarettes a day, adult life.  3.  Alcohol abuse, 12 beers every day or two.  4.  Hypertension.  5.  Diabetes mellitus.  6.  Elevated creatinine 1.6.  7.  Cervical disease, anterior fusion.    This patient is critically ill.  His STS is 5% mortality, mortality and   morbidity 50%.  He has got a completed major anterior wall myocardial   infarction with severe decreased ejection fraction.  The STS calculation does not include the problem of nonbypassability of the LAD.    This should not be an emergency at this time.  He has completed an infarct of   4 days' duration.   Medical therapy is warranted.  We will   schedule for surgery tomorrow if he develops severe chest pain.  At this time, he is in poor   condition for emergency coronary bypass  surgery.    He is a Berkeley Classification IV, New York Heart Classification IV.    I will discuss his care tomorrow morning with the cardiologist.  We will  go toward a medical route for stabilization for a month and then reevalulate  If Diagonal or Ramus could be bypassed or stented.    I have explained the risks, benefits, and alternatives, intent, and   expectation of procedure to the patient.  His risk of mortality in addition to   the risk of infection, bleeding, heart attack, stroke, blood transfusion   risk, liver, lung, kidney failure, diaphragmatic paralysis, paresis, ulnar   neuropathy, chest wall paresthesia, leg paresthesia, reoperation for bleeding,   reoperation for infection, tracheostomy, ventilation, AIDS, hepatitis, missed   counts of surgical items.    I believe he will most likely be a long-term ventilation patient if surgery is done.  He most   likely will be on renal dialysis after surgery.  He may have failure to thrive  because of his low preoperative ejection fraction.  He may require a   permanent pacemaker because of his heart block.    I reviewed this case with Dr. Henderson who is in agreement.  We will reevaluate   patient in the morning.       ____________________________________     MD MICHEL VELA / CHRIS    DD:  09/24/2018 19:29:56  DT:  09/24/2018 23:46:08    D#:  [id number]  Job#:  [id number]

## 2018-09-26 ENCOUNTER — APPOINTMENT (OUTPATIENT)
Dept: CARDIOLOGY | Facility: MEDICAL CENTER | Age: 58
DRG: 222 | End: 2018-09-26
Attending: INTERNAL MEDICINE
Payer: MEDICARE

## 2018-09-26 ENCOUNTER — APPOINTMENT (OUTPATIENT)
Dept: RADIOLOGY | Facility: MEDICAL CENTER | Age: 58
DRG: 222 | End: 2018-09-26
Attending: INTERNAL MEDICINE
Payer: MEDICARE

## 2018-09-26 PROBLEM — E87.6 HYPOKALEMIA: Status: ACTIVE | Noted: 2018-09-26

## 2018-09-26 PROBLEM — E83.42 HYPOMAGNESEMIA: Status: ACTIVE | Noted: 2018-09-26

## 2018-09-26 PROBLEM — E83.39 HYPOPHOSPHATEMIA: Status: ACTIVE | Noted: 2018-09-26

## 2018-09-26 LAB
ANION GAP SERPL CALC-SCNC: 9 MMOL/L (ref 0–11.9)
BASOPHILS # BLD AUTO: 0.2 % (ref 0–1.8)
BASOPHILS # BLD: 0.03 K/UL (ref 0–0.12)
BUN SERPL-MCNC: 27 MG/DL (ref 8–22)
CALCIUM SERPL-MCNC: 8 MG/DL (ref 8.5–10.5)
CHLORIDE SERPL-SCNC: 104 MMOL/L (ref 96–112)
CO2 SERPL-SCNC: 22 MMOL/L (ref 20–33)
CREAT SERPL-MCNC: 0.73 MG/DL (ref 0.5–1.4)
EKG IMPRESSION: NORMAL
EOSINOPHIL # BLD AUTO: 0.02 K/UL (ref 0–0.51)
EOSINOPHIL NFR BLD: 0.2 % (ref 0–6.9)
ERYTHROCYTE [DISTWIDTH] IN BLOOD BY AUTOMATED COUNT: 47.8 FL (ref 35.9–50)
GLUCOSE BLD-MCNC: 100 MG/DL (ref 65–99)
GLUCOSE BLD-MCNC: 101 MG/DL (ref 65–99)
GLUCOSE BLD-MCNC: 102 MG/DL (ref 65–99)
GLUCOSE BLD-MCNC: 105 MG/DL (ref 65–99)
GLUCOSE BLD-MCNC: 108 MG/DL (ref 65–99)
GLUCOSE BLD-MCNC: 123 MG/DL (ref 65–99)
GLUCOSE BLD-MCNC: 137 MG/DL (ref 65–99)
GLUCOSE BLD-MCNC: 139 MG/DL (ref 65–99)
GLUCOSE BLD-MCNC: 153 MG/DL (ref 65–99)
GLUCOSE BLD-MCNC: 89 MG/DL (ref 65–99)
GLUCOSE BLD-MCNC: 94 MG/DL (ref 65–99)
GLUCOSE BLD-MCNC: 95 MG/DL (ref 65–99)
GLUCOSE SERPL-MCNC: 85 MG/DL (ref 65–99)
HCT VFR BLD AUTO: 32.9 % (ref 42–52)
HGB BLD-MCNC: 11.1 G/DL (ref 14–18)
IMM GRANULOCYTES # BLD AUTO: 0.08 K/UL (ref 0–0.11)
IMM GRANULOCYTES NFR BLD AUTO: 0.6 % (ref 0–0.9)
LV EJECT FRACT  99904: 25
LYMPHOCYTES # BLD AUTO: 2.27 K/UL (ref 1–4.8)
LYMPHOCYTES NFR BLD: 17.7 % (ref 22–41)
MAGNESIUM SERPL-MCNC: 1.8 MG/DL (ref 1.5–2.5)
MCH RBC QN AUTO: 30.9 PG (ref 27–33)
MCHC RBC AUTO-ENTMCNC: 33.7 G/DL (ref 33.7–35.3)
MCV RBC AUTO: 91.6 FL (ref 81.4–97.8)
MONOCYTES # BLD AUTO: 1.07 K/UL (ref 0–0.85)
MONOCYTES NFR BLD AUTO: 8.3 % (ref 0–13.4)
NEUTROPHILS # BLD AUTO: 9.36 K/UL (ref 1.82–7.42)
NEUTROPHILS NFR BLD: 73 % (ref 44–72)
NRBC # BLD AUTO: 0 K/UL
NRBC BLD-RTO: 0 /100 WBC
PHOSPHATE SERPL-MCNC: 2.2 MG/DL (ref 2.5–4.5)
PLATELET # BLD AUTO: 136 K/UL (ref 164–446)
PMV BLD AUTO: 10.9 FL (ref 9–12.9)
POTASSIUM SERPL-SCNC: 3.7 MMOL/L (ref 3.6–5.5)
RBC # BLD AUTO: 3.59 M/UL (ref 4.7–6.1)
SODIUM SERPL-SCNC: 135 MMOL/L (ref 135–145)
WBC # BLD AUTO: 12.8 K/UL (ref 4.8–10.8)

## 2018-09-26 PROCEDURE — 99291 CRITICAL CARE FIRST HOUR: CPT | Performed by: INTERNAL MEDICINE

## 2018-09-26 PROCEDURE — 93308 TTE F-UP OR LMTD: CPT

## 2018-09-26 PROCEDURE — 33249 INSJ/RPLCMT DEFIB W/LEAD(S): CPT | Mod: Q0 | Performed by: INTERNAL MEDICINE

## 2018-09-26 PROCEDURE — 99221 1ST HOSP IP/OBS SF/LOW 40: CPT | Mod: 57 | Performed by: INTERNAL MEDICINE

## 2018-09-26 PROCEDURE — 83735 ASSAY OF MAGNESIUM: CPT

## 2018-09-26 PROCEDURE — 700111 HCHG RX REV CODE 636 W/ 250 OVERRIDE (IP)

## 2018-09-26 PROCEDURE — 360979 HCHG DIAGNOSTIC CATH

## 2018-09-26 PROCEDURE — 71045 X-RAY EXAM CHEST 1 VIEW: CPT

## 2018-09-26 PROCEDURE — 33249 INSJ/RPLCMT DEFIB W/LEAD(S): CPT

## 2018-09-26 PROCEDURE — 99232 SBSQ HOSP IP/OBS MODERATE 35: CPT | Mod: 24 | Performed by: INTERNAL MEDICINE

## 2018-09-26 PROCEDURE — 700117 HCHG RX CONTRAST REV CODE 255: Performed by: INTERNAL MEDICINE

## 2018-09-26 PROCEDURE — 33225 L VENTRIC PACING LEAD ADD-ON: CPT | Mod: 74

## 2018-09-26 PROCEDURE — C1769 GUIDE WIRE: HCPCS

## 2018-09-26 PROCEDURE — 80048 BASIC METABOLIC PNL TOTAL CA: CPT

## 2018-09-26 PROCEDURE — A9270 NON-COVERED ITEM OR SERVICE: HCPCS | Performed by: NURSE PRACTITIONER

## 2018-09-26 PROCEDURE — 33225 L VENTRIC PACING LEAD ADD-ON: CPT | Mod: 53 | Performed by: INTERNAL MEDICINE

## 2018-09-26 PROCEDURE — 700111 HCHG RX REV CODE 636 W/ 250 OVERRIDE (IP): Performed by: INTERNAL MEDICINE

## 2018-09-26 PROCEDURE — 85025 COMPLETE CBC W/AUTO DIFF WBC: CPT

## 2018-09-26 PROCEDURE — 99153 MOD SED SAME PHYS/QHP EA: CPT

## 2018-09-26 PROCEDURE — A9270 NON-COVERED ITEM OR SERVICE: HCPCS | Performed by: INTERNAL MEDICINE

## 2018-09-26 PROCEDURE — 700105 HCHG RX REV CODE 258: Performed by: INTERNAL MEDICINE

## 2018-09-26 PROCEDURE — 82962 GLUCOSE BLOOD TEST: CPT | Mod: 91

## 2018-09-26 PROCEDURE — 700102 HCHG RX REV CODE 250 W/ 637 OVERRIDE(OP): Performed by: NURSE PRACTITIONER

## 2018-09-26 PROCEDURE — 93005 ELECTROCARDIOGRAM TRACING: CPT | Performed by: INTERNAL MEDICINE

## 2018-09-26 PROCEDURE — 93010 ELECTROCARDIOGRAM REPORT: CPT | Mod: 77 | Performed by: INTERNAL MEDICINE

## 2018-09-26 PROCEDURE — 02HK3KZ INSERTION OF DEFIBRILLATOR LEAD INTO RIGHT VENTRICLE, PERCUTANEOUS APPROACH: ICD-10-PCS | Performed by: INTERNAL MEDICINE

## 2018-09-26 PROCEDURE — 700102 HCHG RX REV CODE 250 W/ 637 OVERRIDE(OP): Performed by: INTERNAL MEDICINE

## 2018-09-26 PROCEDURE — 304952 HCHG R 2 PADS

## 2018-09-26 PROCEDURE — 99152 MOD SED SAME PHYS/QHP 5/>YRS: CPT

## 2018-09-26 PROCEDURE — 700102 HCHG RX REV CODE 250 W/ 637 OVERRIDE(OP): Performed by: FAMILY MEDICINE

## 2018-09-26 PROCEDURE — C1900 LEAD, CORONARY VENOUS: HCPCS

## 2018-09-26 PROCEDURE — 305387 HCHG SUTURES

## 2018-09-26 PROCEDURE — 700101 HCHG RX REV CODE 250: Performed by: INTERNAL MEDICINE

## 2018-09-26 PROCEDURE — C1899 LEAD, PMKR/AICD COMBINATION: HCPCS

## 2018-09-26 PROCEDURE — 700101 HCHG RX REV CODE 250

## 2018-09-26 PROCEDURE — A9270 NON-COVERED ITEM OR SERVICE: HCPCS | Performed by: FAMILY MEDICINE

## 2018-09-26 PROCEDURE — 99152 MOD SED SAME PHYS/QHP 5/>YRS: CPT | Performed by: INTERNAL MEDICINE

## 2018-09-26 PROCEDURE — 770022 HCHG ROOM/CARE - ICU (200)

## 2018-09-26 PROCEDURE — C1894 INTRO/SHEATH, NON-LASER: HCPCS

## 2018-09-26 PROCEDURE — 93010 ELECTROCARDIOGRAM REPORT: CPT | Performed by: INTERNAL MEDICINE

## 2018-09-26 PROCEDURE — 02H63KZ INSERTION OF DEFIBRILLATOR LEAD INTO RIGHT ATRIUM, PERCUTANEOUS APPROACH: ICD-10-PCS | Performed by: INTERNAL MEDICINE

## 2018-09-26 PROCEDURE — C1721 AICD, DUAL CHAMBER: HCPCS

## 2018-09-26 PROCEDURE — C1887 CATHETER, GUIDING: HCPCS

## 2018-09-26 PROCEDURE — 0JH608Z INSERTION OF DEFIBRILLATOR GENERATOR INTO CHEST SUBCUTANEOUS TISSUE AND FASCIA, OPEN APPROACH: ICD-10-PCS | Performed by: INTERNAL MEDICINE

## 2018-09-26 PROCEDURE — 304853 HCHG PPM TEST CABLE

## 2018-09-26 PROCEDURE — C1898 LEAD, PMKR, OTHER THAN TRANS: HCPCS

## 2018-09-26 PROCEDURE — C1892 INTRO/SHEATH,FIXED,PEEL-AWAY: HCPCS

## 2018-09-26 PROCEDURE — 93640 EP EVAL 1/2CHMBR PACG CVDFB: CPT

## 2018-09-26 PROCEDURE — 84100 ASSAY OF PHOSPHORUS: CPT

## 2018-09-26 RX ORDER — LIDOCAINE HYDROCHLORIDE 10 MG/ML
INJECTION, SOLUTION INFILTRATION; PERINEURAL
Status: COMPLETED
Start: 2018-09-26 | End: 2018-09-26

## 2018-09-26 RX ORDER — IBUPROFEN 200 MG
400 TABLET ORAL 2 TIMES DAILY PRN
Status: ON HOLD | COMMUNITY
End: 2018-12-01

## 2018-09-26 RX ORDER — CEFAZOLIN SODIUM 1 G/3ML
INJECTION, POWDER, FOR SOLUTION INTRAMUSCULAR; INTRAVENOUS
Status: COMPLETED
Start: 2018-09-26 | End: 2018-09-26

## 2018-09-26 RX ORDER — MIDAZOLAM HYDROCHLORIDE 1 MG/ML
INJECTION INTRAMUSCULAR; INTRAVENOUS
Status: COMPLETED
Start: 2018-09-26 | End: 2018-09-26

## 2018-09-26 RX ORDER — BUPIVACAINE HYDROCHLORIDE 2.5 MG/ML
INJECTION, SOLUTION EPIDURAL; INFILTRATION; INTRACAUDAL
Status: COMPLETED
Start: 2018-09-26 | End: 2018-09-26

## 2018-09-26 RX ORDER — MAGNESIUM SULFATE HEPTAHYDRATE 40 MG/ML
2 INJECTION, SOLUTION INTRAVENOUS ONCE
Status: COMPLETED | OUTPATIENT
Start: 2018-09-26 | End: 2018-09-26

## 2018-09-26 RX ORDER — DEXTROSE MONOHYDRATE 25 G/50ML
25 INJECTION, SOLUTION INTRAVENOUS
Status: DISCONTINUED | OUTPATIENT
Start: 2018-09-26 | End: 2018-09-27

## 2018-09-26 RX ORDER — GABAPENTIN 600 MG/1
600 TABLET ORAL 3 TIMES DAILY
COMMUNITY
End: 2018-10-16

## 2018-09-26 RX ORDER — INSULIN GLARGINE 100 [IU]/ML
30 INJECTION, SOLUTION SUBCUTANEOUS NIGHTLY
Status: ON HOLD | COMMUNITY
End: 2018-10-05

## 2018-09-26 RX ADMIN — IOHEXOL 25 ML: 350 INJECTION, SOLUTION INTRAVENOUS at 14:48

## 2018-09-26 RX ADMIN — MIDAZOLAM HYDROCHLORIDE 2 MG: 1 INJECTION, SOLUTION INTRAMUSCULAR; INTRAVENOUS at 14:06

## 2018-09-26 RX ADMIN — INSULIN HUMAN 2 UNITS: 100 INJECTION, SOLUTION PARENTERAL at 09:03

## 2018-09-26 RX ADMIN — ENOXAPARIN SODIUM 40 MG: 100 INJECTION SUBCUTANEOUS at 11:51

## 2018-09-26 RX ADMIN — FOLIC ACID 1 MG: 5 INJECTION, SOLUTION INTRAMUSCULAR; INTRAVENOUS; SUBCUTANEOUS at 06:30

## 2018-09-26 RX ADMIN — ALPRAZOLAM 0.25 MG: 0.25 TABLET ORAL at 23:36

## 2018-09-26 RX ADMIN — CEFAZOLIN 2000 MG: 1 INJECTION, POWDER, FOR SOLUTION INTRAMUSCULAR; INTRAVENOUS at 13:00

## 2018-09-26 RX ADMIN — STANDARDIZED SENNA CONCENTRATE AND DOCUSATE SODIUM 2 TABLET: 8.6; 5 TABLET ORAL at 18:00

## 2018-09-26 RX ADMIN — THIAMINE HYDROCHLORIDE 100 MG: 100 INJECTION, SOLUTION INTRAMUSCULAR; INTRAVENOUS at 06:30

## 2018-09-26 RX ADMIN — INSULIN HUMAN 3 UNITS: 100 INJECTION, SOLUTION PARENTERAL at 23:37

## 2018-09-26 RX ADMIN — ASPIRIN 81 MG: 81 TABLET, COATED ORAL at 05:00

## 2018-09-26 RX ADMIN — MIDAZOLAM HYDROCHLORIDE 2 MG: 1 INJECTION, SOLUTION INTRAMUSCULAR; INTRAVENOUS at 14:02

## 2018-09-26 RX ADMIN — NICOTINE 21 MG: 21 PATCH, EXTENDED RELEASE TRANSDERMAL at 05:00

## 2018-09-26 RX ADMIN — FENTANYL CITRATE 100 MCG: 50 INJECTION, SOLUTION INTRAMUSCULAR; INTRAVENOUS at 14:19

## 2018-09-26 RX ADMIN — MAGNESIUM SULFATE HEPTAHYDRATE 2 G: 40 INJECTION, SOLUTION INTRAVENOUS at 11:51

## 2018-09-26 RX ADMIN — LIDOCAINE HYDROCHLORIDE: 10 INJECTION, SOLUTION INFILTRATION; PERINEURAL at 14:02

## 2018-09-26 RX ADMIN — POTASSIUM PHOSPHATE, MONOBASIC AND POTASSIUM PHOSPHATE, DIBASIC 15 MMOL: 224; 236 INJECTION, SOLUTION INTRAVENOUS at 17:47

## 2018-09-26 RX ADMIN — FAMOTIDINE 20 MG: 10 INJECTION, SOLUTION INTRAVENOUS at 05:01

## 2018-09-26 RX ADMIN — BUPIVACAINE HYDROCHLORIDE: 2.5 INJECTION, SOLUTION EPIDURAL; INFILTRATION; INTRACAUDAL; PERINEURAL at 14:01

## 2018-09-26 RX ADMIN — SODIUM CHLORIDE, POTASSIUM CHLORIDE, SODIUM LACTATE AND CALCIUM CHLORIDE: 600; 310; 30; 20 INJECTION, SOLUTION INTRAVENOUS at 11:51

## 2018-09-26 ASSESSMENT — PAIN SCALES - GENERAL
PAINLEVEL_OUTOF10: 0

## 2018-09-26 ASSESSMENT — ENCOUNTER SYMPTOMS
SEIZURES: 0
CHEST TIGHTNESS: 0
ABDOMINAL PAIN: 0
FATIGUE: 1
NAUSEA: 0
BACK PAIN: 0
BRUISES/BLEEDS EASILY: 0
WHEEZING: 0
POLYPHAGIA: 0
SHORTNESS OF BREATH: 1
CHILLS: 0
COUGH: 1
PALPITATIONS: 0
SHORTNESS OF BREATH: 0
FEVER: 0
EYE DISCHARGE: 0
LIGHT-HEADEDNESS: 0

## 2018-09-26 NOTE — PROGRESS NOTES
Critical Care Progress Note    Date of admission  9/24/2018    Chief Complaint  58 y.o. male admitted 9/24/2018 with CP and SOB    Hospital Course    58 y.o. male with a past medical history of type 2 diabetes mellitus, coronary artery disease, alcohol abuse, hypertension, active cigarette smoking, presented [facility] complaining of chest pain and shortness of breath over the last several days.  He was found to have a third-degree AV block and anterior ST elevation myocardial infarction on EKG with elevation of troponin was greater than 20.  He underwent cardiac catheterization that revealed complete thrombosis of LAD and ejection fraction 20%, and was deferred for cardiothoracic surgery evaluation for CABG.  Patient transferred to ICU with intra-aortic balloon pump in place, therefore ICU consult.  On further review of symptoms he admits to nausea and vomiting.        Interval Problem Update  Reviewed last 24 hour events:   - off insulin gtt overnight due to trending towards hypoglycemia   - AAOx4   - EKG changes with possible increase in STEMI   - fully paced at 80 with underlying complete HB   - AF, improving WBC   - last BM PTA   - NPO for AICD placement this morning   - IABP removed yesterday   - off heparin gtt   - plts down to 136   - low K/phos/Mag    Review of Systems  Review of Systems   Constitutional: Positive for fatigue. Negative for chills and fever.   HENT: Negative for congestion.    Eyes: Negative for discharge.   Respiratory: Positive for cough and shortness of breath. Negative for chest tightness and wheezing.    Cardiovascular: Positive for chest pain and leg swelling. Negative for palpitations.   Gastrointestinal: Negative for abdominal pain and nausea.   Endocrine: Negative for polyphagia.   Genitourinary: Negative for difficulty urinating.   Musculoskeletal: Negative for back pain.   Skin: Negative for pallor.   Neurological: Negative for seizures and light-headedness.    Hematological: Does not bruise/bleed easily.   Psychiatric/Behavioral: Negative for behavioral problems.   All other systems reviewed and are negative.       Vital Signs for last 24 hours   Temp:  [3.1 °C (37.6 °F)-37.7 °C (99.9 °F)] 37 °C (98.6 °F)  Pulse:  [79-96] 80  Resp:  [14-30] 30    Hemodynamic parameters for last 24 hours       Vent Settings for last 24 hours   1 lpm n/c, encourage IS    Physical Exam   Physical Exam   Constitutional: He is oriented to person, place, and time. He appears well-developed and well-nourished.   HENT:   Head: Normocephalic and atraumatic.   Right Ear: External ear normal.   Left Ear: External ear normal.   Nose: Nose normal.   Eyes: Pupils are equal, round, and reactive to light. Conjunctivae and EOM are normal.   Neck: Neck supple. JVD present. No tracheal deviation present. No thyromegaly present.   Cardiovascular: Intact distal pulses.   Occasional extrasystoles are present. Bradycardia present.  PMI is displaced.  Exam reveals distant heart sounds. Exam reveals no friction rub and no decreased pulses.    No murmur heard.  Paced rhythm   Pulmonary/Chest: No respiratory distress. He has no decreased breath sounds. He has no wheezes. He has rales in the right middle field, the right lower field, the left middle field and the left lower field.   Abdominal: Soft. Bowel sounds are normal. There is no tenderness. There is no rebound.   Genitourinary:   Genitourinary Comments: Sharma catheter in place   Musculoskeletal: He exhibits edema. He exhibits no tenderness or deformity.   Right groin site with venous catheter without hematoma   Neurological: He is alert and oriented to person, place, and time. No cranial nerve deficit. Coordination normal.   Skin: Skin is warm and dry. No rash noted. He is not diaphoretic. No pallor.   Psychiatric: He has a normal mood and affect. His behavior is normal. Judgment and thought content normal.   Nursing note and vitals  reviewed.      Medications  Current Facility-Administered Medications   Medication Dose Route Frequency Provider Last Rate Last Dose   • insulin regular (HUMULIN R) injection 2-9 Units  2-9 Units Subcutaneous Q6HRS Jeremy M Gonda, M.D.        And   • glucose 4 g chewable tablet 16 g  16 g Oral Q15 MIN PRN Jeremy M Gonda, M.D.        And   • dextrose 50% (D50W) injection 25 mL  25 mL Intravenous Q15 MIN PRN Jeremy M Gonda, M.D.       • LORazepam (ATIVAN) injection 1-2 mg  1-2 mg Intravenous Q2HRS PRN Aditya Bower M.D.   1 mg at 09/25/18 0202   • thiamine (B-1) 100 mg in D5W 100 mL IVPB  100 mg Intravenous DAILY Aditya Bower M.D. 200 mL/hr at 09/26/18 0630 100 mg at 09/26/18 0630   • folic acid 1 mg in NS 50 mL IVPB  1 mg Intravenous Q24HRS Aditya Bower M.D. 100 mL/hr at 09/26/18 0630 1 mg at 09/26/18 0630   • famotidine (PEPCID) injection 20 mg  20 mg Intravenous DAILY Aditya Bower M.D.   20 mg at 09/26/18 0501   • insulin regular human (HUMULIN/NOVOLIN R) 62.5 Units in  mL infusion per protocol  0-29 Units/hr Intravenous Continuous Aditya Bower M.D.   Stopped at 09/26/18 0255   • dextrose 50% (D50W) injection 25-50 mL  12.5-25 g Intravenous PRN Aditya Bower M.D.       • lactated ringers infusion   Intravenous Continuous Aditya Bower M.D. 100 mL/hr at 09/25/18 2330     • chlorproMAZINE (THORAZINE) 25 mg in NS 50 mL IVPB  25 mg Intravenous Q6HRS PRN Jeremy M Gonda, M.D.   Stopped at 09/26/18 0002   • hyoscyamine-maalox plus-lidocaine viscous (GI COCKTAIL) oral susp 15 mL  15 mL Oral Q6HRS PRN Russel Carrera M.D.       • aspirin EC (ECOTRIN) tablet 81 mg  81 mg Oral DAILY Dimas Henderson M.D.   81 mg at 09/26/18 0500   • Respiratory Care per Protocol   Nebulization Continuous RT Galina Taveras, A.P.N.       • ALPRAZolam (XANAX) tablet 0.25 mg  0.25 mg Oral Q6HRS PRN Galina Taveras, A.P.N.   0.25 mg at 09/24/18 1403   • heparin in NS 2 units/mL (art-line)   Intra-arterial  Continuous Dimas Henderson M.D.       • nitroglycerin (NITROSTAT) tablet 0.4 mg  0.4 mg Sublingual Q5 MIN PRN Manuel Gonzalez M.D.       • morphine (pf) 4 mg/ml injection 2-4 mg  2-4 mg Intravenous Q5 MIN PRN Manuel Gonzalez M.D.   2 mg at 09/25/18 1608   • senna-docusate (PERICOLACE or SENOKOT S) 8.6-50 MG per tablet 2 Tab  2 Tab Oral BID Manuel Gonzalez M.D.   Stopped at 09/25/18 0600    And   • polyethylene glycol/lytes (MIRALAX) PACKET 1 Packet  1 Packet Oral QDAY PRROLY Gonzalez M.D.        And   • magnesium hydroxide (MILK OF MAGNESIA) suspension 30 mL  30 mL Oral QDAY PRN Manuel Gonzalez M.D.        And   • bisacodyl (DULCOLAX) suppository 10 mg  10 mg Rectal QDAY PRN Manuel Gonzalez M.D.       • labetalol (NORMODYNE,TRANDATE) injection 10 mg  10 mg Intravenous Q4HRS PRN Manuel Gonzalez M.D.       • nicotine (NICODERM) 21 MG/24HR 21 mg  21 mg Transdermal Daily-0600 Manuel Gonzalez M.D.   21 mg at 09/26/18 0500    And   • nicotine polacrilex (NICORETTE) 2 MG piece 2 mg  2 mg Oral Q HOUR PRN Manuel Gonzalez M.D.       • promethazine (PHENERGAN) tablet 25 mg  25 mg Oral Q6HRS PRROLY Gonzalez M.D.   25 mg at 09/24/18 2238   • promethazine (PHENERGAN) suppository 25 mg  25 mg Rectal Q6HRS PRROLY Gonzalez M.D.       • MD Alert...HEPARIN WEIGHT BASED PROTOCOL Pharmacist to implement   Other PRN Galina Taveras, A.P.N.       • heparin injection 3,200 Units  3,200 Units Intravenous PRN Galina Taveras, A.P.N.        And   • heparin infusion 25,000 units in 500 ml 0.45% nacl   Intravenous Continuous Galina Taveras, A.P.N.   Stopped at 09/25/18 1311       Fluids    Intake/Output Summary (Last 24 hours) at 09/26/18 0817  Last data filed at 09/26/18 0600   Gross per 24 hour   Intake          2734.75 ml   Output              670 ml   Net          2064.75 ml       Laboratory  Recent Results (from the past 48 hour(s))   EKG (NOW)    Collection Time: 09/24/18  5:07 PM   Result  Value Ref Range    Report       [facility] Emergency Dept.    Test Date:  2018  Pt Name:    KIRK FRITZ               Department: ER  MRN:        [id number]                      Room:       T610  Gender:     Male                         Technician: [id number]  :        1960                   Requested By:JAIMIE EASTON  Order #:    [id number]                    Reading MD: JAIMIE EASTON MD    Measurements  Intervals                                Axis  Rate:       50                           P:          86  CT:                                      QRS:        102  QRSD:       166                          T:          -84  QT:         572  QTc:        522    Interpretive Statements  COMPLETE AV BLOCK WITH WIDE QRS COMPLEX  ST Elevation Precordial  STEMI  No previous ECG available for comparison    Electronically Signed On 2018 22:21:36 PDT by JAIMIE EASTON MD     Troponin    Collection Time: 18  5:22 PM   Result Value Ref Range    Troponin I 32.13 (H) 0.00 - 0.04 ng/mL   Btype Natriuretic Peptide    Collection Time: 18  5:22 PM   Result Value Ref Range    B Natriuretic Peptide 633 (H) 0 - 100 pg/mL   CBC with Differential    Collection Time: 18  5:22 PM   Result Value Ref Range    WBC 19.8 (H) 4.8 - 10.8 K/uL    RBC 4.58 (L) 4.70 - 6.10 M/uL    Hemoglobin 14.3 14.0 - 18.0 g/dL    Hematocrit 43.2 42.0 - 52.0 %    MCV 94.3 81.4 - 97.8 fL    MCH 31.2 27.0 - 33.0 pg    MCHC 33.1 (L) 33.7 - 35.3 g/dL    RDW 48.1 35.9 - 50.0 fL    Platelet Count 156 (L) 164 - 446 K/uL    MPV 10.9 9.0 - 12.9 fL    Neutrophils-Polys 85.20 (H) 44.00 - 72.00 %    Lymphocytes 4.50 (L) 22.00 - 41.00 %    Monocytes 8.90 0.00 - 13.40 %    Eosinophils 0.10 0.00 - 6.90 %    Basophils 0.20 0.00 - 1.80 %    Immature Granulocytes 1.10 (H) 0.00 - 0.90 %    Nucleated RBC 0.00 /100 WBC    Neutrophils (Absolute) 16.91 (H) 1.82 - 7.42 K/uL    Lymphs (Absolute) 0.89 (L) 1.00 - 4.80 K/uL    Monos  (Absolute) 1.76 (H) 0.00 - 0.85 K/uL    Eos (Absolute) 0.01 0.00 - 0.51 K/uL    Baso (Absolute) 0.04 0.00 - 0.12 K/uL    Immature Granulocytes (abs) 0.22 (H) 0.00 - 0.11 K/uL    NRBC (Absolute) 0.00 K/uL   Complete Metabolic Panel (CMP)    Collection Time: 18  5:22 PM   Result Value Ref Range    Sodium 134 (L) 135 - 145 mmol/L    Potassium 4.1 3.6 - 5.5 mmol/L    Chloride 96 96 - 112 mmol/L    Co2 17 (L) 20 - 33 mmol/L    Anion Gap 21.0 (H) 0.0 - 11.9    Glucose 370 (H) 65 - 99 mg/dL    Bun 37 (H) 8 - 22 mg/dL    Creatinine 1.67 (H) 0.50 - 1.40 mg/dL    Calcium 8.6 8.5 - 10.5 mg/dL    AST(SGOT) 211 (H) 12 - 45 U/L    ALT(SGPT) 180 (H) 2 - 50 U/L    Alkaline Phosphatase 68 30 - 99 U/L    Total Bilirubin 2.5 (H) 0.1 - 1.5 mg/dL    Albumin 3.5 3.2 - 4.9 g/dL    Total Protein 6.1 6.0 - 8.2 g/dL    Globulin 2.6 1.9 - 3.5 g/dL    A-G Ratio 1.3 g/dL   Prothrombin Time    Collection Time: 18  5:22 PM   Result Value Ref Range    PT 16.7 (H) 12.0 - 14.6 sec    INR 1.34 (H) 0.87 - 1.13   APTT    Collection Time: 18  5:22 PM   Result Value Ref Range    APTT 29.3 24.7 - 36.0 sec   Lipase    Collection Time: 18  5:22 PM   Result Value Ref Range    Lipase 5 (L) 11 - 82 U/L   ESTIMATED GFR    Collection Time: 18  5:22 PM   Result Value Ref Range    GFR If  51 (A) >60 mL/min/1.73 m 2    GFR If Non  42 (A) >60 mL/min/1.73 m 2   COD (Adult)    Collection Time: 18  5:22 PM   Result Value Ref Range    ABO Grouping Only A     Rh Grouping Only POS     Antibody Screen-Cod NEG    EKG    Collection Time: 18  5:28 PM   Result Value Ref Range    Report       [facility] Emergency Dept.    Test Date:  2018  Pt Name:    KIRK FRITZ               Department: ER  MRN:        [id number]                      Room:       [facility]  Gender:     Male                         Technician: [id number]  :        1960                   Requested By:JAIMIE CARRILLO  RAMON  Order #:    [id number]                    Reading MD: JAIMIE EASTON MD    Measurements  Intervals                                Axis  Rate:       52                           P:          0  IL:                                      QRS:        100  QRSD:       132                          T:          -80  QT:         524  QTc:        488    Interpretive Statements  COMPLETE AV BLOCK, A-RATE 116  PROBABLE RVH W/ SECONDARY REPOL ABNORMALITY  ABNORMAL T, CONSIDER ISCHEMIA, LATERAL LEADS  ST ELEVATION, CONSIDER ANTERIOR INJURY  BORDERLINE PROLONGED QT INTERVAL  BASELINE WANDER IN LEAD(S) V2  Compared to ECG 2018 17:07:26  T-wave abnormality now present  Possible is chemia now present  ST (T wave) deviation now present  Myocardial infarct finding now present    Electronically Signed On 2018 22:21:41 PDT by JAIMIE EASTON MD     EKG    Collection Time: 18  8:49 PM   Result Value Ref Range    Report       [facility]    Test Date:  2018  Pt Name:    KIRK FRITZ               Department: ER  MRN:        [id number]                      Room:       [facility]  Gender:     Male                         Technician: [facility]  :        1960                   Requested By:STACIE PERES  Order #:    [id number]                    Reading MD: Ran Page MD    Measurements  Intervals                                Axis  Rate:       76                           P:          0  IL:         175                          QRS:        -81  QRSD:       126                          T:          84  QT:         444  QTc:        500    Interpretive Statements  VENTRICULAR-PACED RHYTHM  NO FURTHER ANALYSIS ATTEMPTED DUE TO PACED RHYTHM  Compared to ECG 2018 17:28:59  AV block, complete (third-degree) no longer present  T-wave abnormality no longer present  Possible ischemia no longer present  ST (T wave) deviation no longer present  Myocardial infarct finding no longer present    Electron mariama  Signed On 9- 6:18:47 PDT by Ran Page MD     Comp Metabolic Panel (CMP)    Collection Time: 09/24/18  9:00 PM   Result Value Ref Range    Sodium 131 (L) 135 - 145 mmol/L    Potassium 4.8 3.6 - 5.5 mmol/L    Chloride 96 96 - 112 mmol/L    Co2 15 (L) 20 - 33 mmol/L    Anion Gap 20.0 (H) 0.0 - 11.9    Glucose 367 (H) 65 - 99 mg/dL    Bun 38 (H) 8 - 22 mg/dL    Creatinine 1.47 (H) 0.50 - 1.40 mg/dL    Calcium 8.7 8.5 - 10.5 mg/dL    AST(SGOT) 306 (H) 12 - 45 U/L    ALT(SGPT) 230 (H) 2 - 50 U/L    Alkaline Phosphatase 69 30 - 99 U/L    Total Bilirubin 1.7 (H) 0.1 - 1.5 mg/dL    Albumin 3.7 3.2 - 4.9 g/dL    Total Protein 6.1 6.0 - 8.2 g/dL    Globulin 2.4 1.9 - 3.5 g/dL    A-G Ratio 1.5 g/dL   CBC with Differential    Collection Time: 09/24/18  9:00 PM   Result Value Ref Range    WBC 20.4 (H) 4.8 - 10.8 K/uL    RBC 4.66 (L) 4.70 - 6.10 M/uL    Hemoglobin 14.2 14.0 - 18.0 g/dL    Hematocrit 42.7 42.0 - 52.0 %    MCV 91.6 81.4 - 97.8 fL    MCH 30.5 27.0 - 33.0 pg    MCHC 33.3 (L) 33.7 - 35.3 g/dL    RDW 47.3 35.9 - 50.0 fL    Platelet Count 154 (L) 164 - 446 K/uL    MPV 10.8 9.0 - 12.9 fL    Neutrophils-Polys 82.10 (H) 44.00 - 72.00 %    Lymphocytes 9.40 (L) 22.00 - 41.00 %    Monocytes 7.90 0.00 - 13.40 %    Eosinophils 0.00 0.00 - 6.90 %    Basophils 0.10 0.00 - 1.80 %    Immature Granulocytes 0.50 0.00 - 0.90 %    Nucleated RBC 0.00 /100 WBC    Neutrophils (Absolute) 16.72 (H) 1.82 - 7.42 K/uL    Lymphs (Absolute) 1.92 1.00 - 4.80 K/uL    Monos (Absolute) 1.61 (H) 0.00 - 0.85 K/uL    Eos (Absolute) 0.01 0.00 - 0.51 K/uL    Baso (Absolute) 0.03 0.00 - 0.12 K/uL    Immature Granulocytes (abs) 0.10 0.00 - 0.11 K/uL    NRBC (Absolute) 0.00 K/uL   Hemoglobin  A1c    Collection Time: 09/24/18  9:00 PM   Result Value Ref Range    Glycohemoglobin 12.3 (H) 0.0 - 5.6 %    Est Avg Glucose 306 mg/dL   Prothrombin time (INR)    Collection Time: 09/24/18  9:00 PM   Result Value Ref Range    PT 27.0 (H) 12.0 - 14.6 sec     INR 2.49 (H) 0.87 - 1.13   APTT (PTT)    Collection Time: 09/24/18  9:00 PM   Result Value Ref Range    APTT 136.4 (HH) 24.7 - 36.0 sec   TSH (Thyroid Stimulating Hormone)    Collection Time: 09/24/18  9:00 PM   Result Value Ref Range    TSH 0.840 0.380 - 5.330 uIU/mL   TROPONIN    Collection Time: 09/24/18  9:00 PM   Result Value Ref Range    Troponin I 41.05 (H) 0.00 - 0.04 ng/mL   ESTIMATED GFR    Collection Time: 09/24/18  9:00 PM   Result Value Ref Range    GFR If  59 (A) >60 mL/min/1.73 m 2    GFR If Non African American 49 (A) >60 mL/min/1.73 m 2   EC-ECHOCARDIOGRAM COMPLETE W/O CONT    Collection Time: 09/24/18  9:19 PM   Result Value Ref Range    Eject.Frac. MOD BP 31.43     Eject.Frac. MOD 4C 28.5     Eject.Frac. MOD 2C 33.75     Left Ventrical Ejection Fraction 15    Urinalysis    Collection Time: 09/24/18 10:07 PM   Result Value Ref Range    Micro Urine Req Microscopic     Color Yellow     Character Cloudy (A)     Specific Gravity >=1.045 (A) <1.035    Ph 5.0 5.0 - 8.0    Glucose >=1000 (A) Negative mg/dL    Ketones 15 (A) Negative mg/dL    Protein 100 (A) Negative mg/dL    Bilirubin Negative Negative    Urobilinogen, Urine 1.0 Negative    Nitrite Negative Negative    Leukocyte Esterase Negative Negative    Occult Blood Trace (A) Negative   URINE MICROSCOPIC (W/UA)    Collection Time: 09/24/18 10:07 PM   Result Value Ref Range    WBC 0-2 (A) /hpf    RBC 2-5 (A) /hpf    Bacteria Negative None /hpf    Epithelial Cells Rare /hpf    Trans Epithelial Cells Rare /hpf    Hyaline Cast 0-2 /lpf   ACCU-CHEK GLUCOSE    Collection Time: 09/24/18 10:42 PM   Result Value Ref Range    Glucose - Accu-Ck 378 (H) 65 - 99 mg/dL   EKG in four (4) hours    Collection Time: 09/25/18 12:58 AM   Result Value Ref Range    Report       [facility]    Test Date:  2018-09-25  Pt Name:    KIRK FRITZ               Department: 161  MRN:        [id number]                      Room:       T610  Gender:     Male                          Technician: ARDEN  :        1960                   Requested By:NEELA BENTLEY  Order #:    [id number]                    Reading MD: Ran Page MD    Measurements  Intervals                                Axis  Rate:       117                          P:  KS:                                      QRS:        77  QRSD:       118                          T:          236  QT:         342  QTc:        477    Interpretive Statements  PACEMAKER APPEARS TO BE TURNED OFF WITH UNDERLYING COMPLETE HEART BLOCK    Electronically Signed On 2018 6:21:54 PDT by Ran Page MD     ACCU-CHEK GLUCOSE    Collection Time: 18  1:10 AM   Result Value Ref Range    Glucose - Accu-Ck 351 (H) 65 - 99 mg/dL   ABO AND RH CONFIRMATION    Collection Time: 18  1:28 AM   Result Value Ref Range    ABO Confirm A     Second Rh Group POS    CBC WITH DIFFERENTIAL    Collection Time: 18  1:28 AM   Result Value Ref Range    WBC 19.6 (H) 4.8 - 10.8 K/uL    RBC 4.56 (L) 4.70 - 6.10 M/uL    Hemoglobin 14.1 14.0 - 18.0 g/dL    Hematocrit 41.2 (L) 42.0 - 52.0 %    MCV 90.4 81.4 - 97.8 fL    MCH 30.9 27.0 - 33.0 pg    MCHC 34.2 33.7 - 35.3 g/dL    RDW 45.8 35.9 - 50.0 fL    Platelet Count 165 164 - 446 K/uL    MPV 10.7 9.0 - 12.9 fL    Neutrophils-Polys 84.10 (H) 44.00 - 72.00 %    Lymphocytes 7.40 (L) 22.00 - 41.00 %    Monocytes 7.70 0.00 - 13.40 %    Eosinophils 0.00 0.00 - 6.90 %    Basophils 0.20 0.00 - 1.80 %    Immature Granulocytes 0.60 0.00 - 0.90 %    Nucleated RBC 0.00 /100 WBC    Neutrophils (Absolute) 16.49 (H) 1.82 - 7.42 K/uL    Lymphs (Absolute) 1.46 1.00 - 4.80 K/uL    Monos (Absolute) 1.51 (H) 0.00 - 0.85 K/uL    Eos (Absolute) 0.00 0.00 - 0.51 K/uL    Baso (Absolute) 0.04 0.00 - 0.12 K/uL    Immature Granulocytes (abs) 0.12 (H) 0.00 - 0.11 K/uL    NRBC (Absolute) 0.00 K/uL   COMP METABOLIC PANEL    Collection Time: 18  1:28 AM   Result Value Ref Range    Sodium 133  (L) 135 - 145 mmol/L    Potassium 4.3 3.6 - 5.5 mmol/L    Chloride 95 (L) 96 - 112 mmol/L    Co2 26 20 - 33 mmol/L    Anion Gap 12.0 (H) 0.0 - 11.9    Glucose 366 (H) 65 - 99 mg/dL    Bun 39 (H) 8 - 22 mg/dL    Creatinine 1.40 0.50 - 1.40 mg/dL    Calcium 9.0 8.5 - 10.5 mg/dL    AST(SGOT) 297 (H) 12 - 45 U/L    ALT(SGPT) 204 (H) 2 - 50 U/L    Alkaline Phosphatase 70 30 - 99 U/L    Total Bilirubin 1.2 0.1 - 1.5 mg/dL    Albumin 3.4 3.2 - 4.9 g/dL    Total Protein 6.4 6.0 - 8.2 g/dL    Globulin 3.0 1.9 - 3.5 g/dL    A-G Ratio 1.1 g/dL   Lipid Profile (Lipid Panel) Fasting    Collection Time: 09/25/18  1:28 AM   Result Value Ref Range    Cholesterol,Tot 128 100 - 199 mg/dL    Triglycerides 94 0 - 149 mg/dL    HDL 24 (A) >=40 mg/dL    LDL 85 <100 mg/dL   TROPONIN    Collection Time: 09/25/18  1:28 AM   Result Value Ref Range    Troponin I 43.85 (H) 0.00 - 0.04 ng/mL   ESTIMATED GFR    Collection Time: 09/25/18  1:28 AM   Result Value Ref Range    GFR If African American >60 >60 mL/min/1.73 m 2    GFR If Non African American 52 (A) >60 mL/min/1.73 m 2   PROCALCITONIN    Collection Time: 09/25/18  1:28 AM   Result Value Ref Range    Procalcitonin 1.50 (H) <0.25 ng/mL   BETA-HYDROXYBUTYRIC ACID    Collection Time: 09/25/18  1:42 AM   Result Value Ref Range    beta-Hydroxybutyric Acid 1.20 (H) 0.02 - 0.27 mmol/L   OSMOLALITY SERUM    Collection Time: 09/25/18  1:42 AM   Result Value Ref Range    Osmolality Serum 305 (H) 278 - 298 mOsm/kg H2O   ACCU-CHEK GLUCOSE    Collection Time: 09/25/18  3:43 AM   Result Value Ref Range    Glucose - Accu-Ck 305 (H) 65 - 99 mg/dL   ACCU-CHEK GLUCOSE    Collection Time: 09/25/18  4:49 AM   Result Value Ref Range    Glucose - Accu-Ck 246 (H) 65 - 99 mg/dL   ACCU-CHEK GLUCOSE    Collection Time: 09/25/18  6:03 AM   Result Value Ref Range    Glucose - Accu-Ck 288 (H) 65 - 99 mg/dL   ACCU-CHEK GLUCOSE    Collection Time: 09/25/18  7:07 AM   Result Value Ref Range    Glucose - Accu-Ck 246 (H)  65 - 99 mg/dL   ACCU-CHEK GLUCOSE    Collection Time: 09/25/18  8:02 AM   Result Value Ref Range    Glucose - Accu-Ck 222 (H) 65 - 99 mg/dL   ACCU-CHEK GLUCOSE    Collection Time: 09/25/18  9:14 AM   Result Value Ref Range    Glucose - Accu-Ck 203 (H) 65 - 99 mg/dL   ACCU-CHEK GLUCOSE    Collection Time: 09/25/18 10:06 AM   Result Value Ref Range    Glucose - Accu-Ck 173 (H) 65 - 99 mg/dL   ACCU-CHEK GLUCOSE    Collection Time: 09/25/18 11:10 AM   Result Value Ref Range    Glucose - Accu-Ck 166 (H) 65 - 99 mg/dL   ACCU-CHEK GLUCOSE    Collection Time: 09/25/18 12:03 PM   Result Value Ref Range    Glucose - Accu-Ck 166 (H) 65 - 99 mg/dL   ACCU-CHEK GLUCOSE    Collection Time: 09/25/18  1:09 PM   Result Value Ref Range    Glucose - Accu-Ck 149 (H) 65 - 99 mg/dL   ACCU-CHEK GLUCOSE    Collection Time: 09/25/18  2:51 PM   Result Value Ref Range    Glucose - Accu-Ck 174 (H) 65 - 99 mg/dL   ACCU-CHEK GLUCOSE    Collection Time: 09/25/18  4:00 PM   Result Value Ref Range    Glucose - Accu-Ck 180 (H) 65 - 99 mg/dL   ACCU-CHEK GLUCOSE    Collection Time: 09/25/18  5:13 PM   Result Value Ref Range    Glucose - Accu-Ck 125 (H) 65 - 99 mg/dL   ACCU-CHEK GLUCOSE    Collection Time: 09/25/18  6:44 PM   Result Value Ref Range    Glucose - Accu-Ck 139 (H) 65 - 99 mg/dL   ACCU-CHEK GLUCOSE    Collection Time: 09/25/18  7:46 PM   Result Value Ref Range    Glucose - Accu-Ck 167 (H) 65 - 99 mg/dL   ACCU-CHEK GLUCOSE    Collection Time: 09/25/18  9:11 PM   Result Value Ref Range    Glucose - Accu-Ck 153 (H) 65 - 99 mg/dL   ACCU-CHEK GLUCOSE    Collection Time: 09/25/18 10:08 PM   Result Value Ref Range    Glucose - Accu-Ck 139 (H) 65 - 99 mg/dL   BASIC METABOLIC PANEL    Collection Time: 09/26/18  3:40 AM   Result Value Ref Range    Sodium 135 135 - 145 mmol/L    Potassium 3.7 3.6 - 5.5 mmol/L    Chloride 104 96 - 112 mmol/L    Co2 22 20 - 33 mmol/L    Glucose 85 65 - 99 mg/dL    Bun 27 (H) 8 - 22 mg/dL    Creatinine 0.73 0.50 - 1.40  mg/dL    Calcium 8.0 (L) 8.5 - 10.5 mg/dL    Anion Gap 9.0 0.0 - 11.9   MAGNESIUM    Collection Time: 18  3:40 AM   Result Value Ref Range    Magnesium 1.8 1.5 - 2.5 mg/dL   PHOSPHORUS    Collection Time: 18  3:40 AM   Result Value Ref Range    Phosphorus 2.2 (L) 2.5 - 4.5 mg/dL   CBC WITH DIFFERENTIAL    Collection Time: 18  3:40 AM   Result Value Ref Range    WBC 12.8 (H) 4.8 - 10.8 K/uL    RBC 3.59 (L) 4.70 - 6.10 M/uL    Hemoglobin 11.1 (L) 14.0 - 18.0 g/dL    Hematocrit 32.9 (L) 42.0 - 52.0 %    MCV 91.6 81.4 - 97.8 fL    MCH 30.9 27.0 - 33.0 pg    MCHC 33.7 33.7 - 35.3 g/dL    RDW 47.8 35.9 - 50.0 fL    Platelet Count 136 (L) 164 - 446 K/uL    MPV 10.9 9.0 - 12.9 fL    Neutrophils-Polys 73.00 (H) 44.00 - 72.00 %    Lymphocytes 17.70 (L) 22.00 - 41.00 %    Monocytes 8.30 0.00 - 13.40 %    Eosinophils 0.20 0.00 - 6.90 %    Basophils 0.20 0.00 - 1.80 %    Immature Granulocytes 0.60 0.00 - 0.90 %    Nucleated RBC 0.00 /100 WBC    Neutrophils (Absolute) 9.36 (H) 1.82 - 7.42 K/uL    Lymphs (Absolute) 2.27 1.00 - 4.80 K/uL    Monos (Absolute) 1.07 (H) 0.00 - 0.85 K/uL    Eos (Absolute) 0.02 0.00 - 0.51 K/uL    Baso (Absolute) 0.03 0.00 - 0.12 K/uL    Immature Granulocytes (abs) 0.08 0.00 - 0.11 K/uL    NRBC (Absolute) 0.00 K/uL   ESTIMATED GFR    Collection Time: 18  3:40 AM   Result Value Ref Range    GFR If African American >60 >60 mL/min/1.73 m 2    GFR If Non African American >60 >60 mL/min/1.73 m 2   EKG    Collection Time: 18  3:52 AM   Result Value Ref Range    Report       [facility]    Test Date:  2018  Pt Name:    KIRK FRITZ               Department: 161  MRN:        [id number]                      Room:       10  Gender:     Male                         Technician: ARDEN  :        1960                   Requested By:MAXIMO SMITH  Order #:    [id number]                    Reading MD: Minh Moreno MD    Measurements  Intervals                                 Axis  Rate:       94                           P:          77  WV:         188                          QRS:        -88  QRSD:       162                          T:          82  QT:         424  QTc:        531    Interpretive Statements  SINUS RHYTHM  RIGHT BUNDLE BRANCH BLOCK  ANTEROLATERAL INFARCT, ACUTE  LEFT ANTERIOR FASCICULAR BLOCK    Electronically Signed On 9- 6:42:59 PDT by Minh Moreno MD         Imaging  X-Ray:  I have personally reviewed the images and compared with prior images. and My impression is: Unchanged bibasilar atelectasis with mild edema, cervical hardware noted, transvenous pacer noted in good position, and intra-aortic balloon pump removed    Assessment/Plan  * STEMI (ST elevation myocardial infarction) ([facility])   Assessment & Plan    S/P angioplasty 9/24  Continue aspirin, statin  Nitroglycerin as needed  As needed EKG         Cardiogenic shock ([facility])   Assessment & Plan    Improving  Continue transvenous pacing, as needed dopamine        Diabetic ketoacidosis associated with type 2 diabetes mellitus (HCC)- (present on admission)   Assessment & Plan    Improving  Diabetic diet after AICD placement  Long and short acting insulin        Complete heart block by electrocardiogram ([facility])   Assessment & Plan    Continue transvenous temporary pacing  Optimize electrolytes  Plan for AICD/Pacer today        Acute-on-chronic kidney injury (HCC)- (present on admission)   Assessment & Plan    likely ATN  Avoid nephrotoxins  Monitor daily GFR and urine output  Maintain perfusion to kidneys        Leukocytosis- (present on admission)   Assessment & Plan    Likely reactive, doubt infection, improving  Monitor off antibiotics currently        Elevated LFTs- (present on admission)   Assessment & Plan    Secondary to hepatic congestion and heart failure/shock  Avoid hepatotoxins          Hypomagnesemia   Assessment & Plan    Repletion and monitor daily        Hypophosphatemia   Assessment  & Plan    Repletion and monitor daily        Hypokalemia   Assessment & Plan    Repletion and monitor daily        Thrombocytopenia (HCC)   Assessment & Plan    Worsening, monitor with daily CBC  Conservative transfusion strategy        Ischemic cardiomyopathy- (present on admission)   Assessment & Plan    Eventual beta-blocker and ACE inhibitor  Start diuretic        Hyperbilirubinemia- (present on admission)   Assessment & Plan    Secondary to hepatic congestion, monitor        HTN (hypertension), malignant   Assessment & Plan    Currently in shock  Resume antihypertensives once appropriate        Smoking   Assessment & Plan    Tobacco cessation education             VTE:  Lovenox  Ulcer: Not Indicated  Lines: Central Line  Ongoing indication addressed    I have performed a physical exam and reviewed and updated ROS and Plan today (9/26/2018). In review of yesterday's note (9/25/2018), there are no changes except as documented above.      Discussed patient condition and risk of morbidity and/or mortality with Hospitalist, RN, RT, Pharmacy, , Charge nurse / hot rounds, Patient and cardiology  The patient remains critically ill today requiring my active management of his persistent heart block resulting in shock, developing pulmonary edema, ongoing chest pain with high risk for decompensation.  Critical care time = 33 minutes in directly providing and coordinating critical care and extensive data review.  No time overlap and excludes procedures.

## 2018-09-26 NOTE — PROGRESS NOTES
Patient complained of chest pressure/ache on left side.  STAT EKG was ordered. Morphology changes in leads V4 and V5. Cardiology paged,  updated at bedside, reviewed EKG. No new orders at this time. Current plan of care appropriate

## 2018-09-26 NOTE — PROGRESS NOTES
Cardiology Follow Up Progress Note    Date of Service  9/26/2018    Attending Physician  Ran Page M.D.    Chief Complaint   Chest pain    HPI  Nisa Ross is a 58 y.o. male admitted 9/24/2018 with an ST segment elevation myocardial infarction was found to have thrombosis of the LAD.  He was also in third-degree AV block and had a severe reduction of ejection fraction.  His chest pain had been going on for days.    Interim Events  Patient underwent cardiac catheterization.  He underwent thrombectomy and PCTA of the LAD.  The LAD was noted to be dissected.  He had CT of the RCA and 80% disease in the ramus intermedius.  An intra-aortic balloon pump was placed.  Cardiovascular surgery was consulted.  This morning a discussion was held with Dr. Hightower with myself.  He felt that the patient was too high risk and would have limited benefit from revascularization of the LAD.  Surgery was therefore canceled.      Patient's intra-aortic balloon pump was discontinued on 9/25.  He has been maintaining a low blood pressure without pressor support.  His MAP is adequate.    Review of Systems  Review of Systems   Respiratory: Negative for shortness of breath.    Cardiovascular: Negative for chest pain and palpitations.       Vital signs in last 24 hours  Temp:  [3.1 °C (37.6 °F)-37.7 °C (99.9 °F)] 37 °C (98.6 °F)  Pulse:  [79-96] 80  Resp:  [14-30] 30    Physical Exam  Physical Exam   Neck: No JVD present.   Cardiovascular: Normal rate and regular rhythm.  Exam reveals no gallop.    No murmur heard.  Pulmonary/Chest: Effort normal. He has no rales.   Abdominal: Soft. There is no tenderness.   Musculoskeletal: He exhibits no edema.       Lab Review  Lab Results   Component Value Date/Time    WBC 12.8 (H) 09/26/2018 03:40 AM    RBC 3.59 (L) 09/26/2018 03:40 AM    HEMOGLOBIN 11.1 (L) 09/26/2018 03:40 AM    HEMATOCRIT 32.9 (L) 09/26/2018 03:40 AM    MCV 91.6 09/26/2018 03:40 AM    MCH 30.9 09/26/2018 03:40 AM     MCHC 33.7 09/26/2018 03:40 AM    MPV 10.9 09/26/2018 03:40 AM      Lab Results   Component Value Date/Time    SODIUM 135 09/26/2018 03:40 AM    POTASSIUM 3.7 09/26/2018 03:40 AM    CHLORIDE 104 09/26/2018 03:40 AM    CO2 22 09/26/2018 03:40 AM    GLUCOSE 85 09/26/2018 03:40 AM    BUN 27 (H) 09/26/2018 03:40 AM    CREATININE 0.73 09/26/2018 03:40 AM      Lab Results   Component Value Date/Time    ASTSGOT 297 (H) 09/25/2018 01:28 AM    ALTSGPT 204 (H) 09/25/2018 01:28 AM     Lab Results   Component Value Date/Time    CHOLSTRLTOT 128 09/25/2018 01:28 AM    LDL 85 09/25/2018 01:28 AM    HDL 24 (A) 09/25/2018 01:28 AM    TRIGLYCERIDE 94 09/25/2018 01:28 AM    TROPONINI 43.85 (H) 09/25/2018 01:28 AM       Recent Labs      09/24/18   1722   BNPBTYPENAT  633*       Cardiac Imaging and Procedures Review  EKG:  My personal interpretation of the EKG dated 9/25 is ventricular paced with complete AV block and no ventricular escape and the pacemaker is turned off.    Echocardiogram:   CONCLUSIONS  No prior study is available for comparison.   Severely reduced left ventricular systolic function. Left ventricular   ejection fraction is visually estimated to be 15%.  Only preserved wall motion at the basal anterior wall segment.  No evidence of valvular abnormality based on Doppler evaluation.        KIRK FRITZ  Exam Date:         09/24/2018     Cardiac Catheterization:    ANGIOGRAM:  LEFT MAIN CORONARY ARTERY:  Left main coronary artery is a moderate length   large caliber vessel free of disease.  LEFT ANTERIOR DESCENDING ARTERY:  Left anterior descending artery is a long,   mild-to-moderate caliber vessel which wraps around the apex.  It is occluded   at the ostium.  Proximal to mid portion of the vessel with a long area of   dissection.  There is an early originating large diagonal branch with proximal   concentric 90% stenosis.  RAMUS INTERMEDIUS:  The ramus intermedius is a very large caliber vessel with   ostial  "concentric 80% stenosis.  LEFT CIRCUMFLEX ARTERY:  Left circumflex artery is a nondominant small caliber   vessel with small obtuse marginal branches noted free of disease.  RIGHT CORONARY ARTERY:  Right coronary artery is a dominant vessel which is   occluded at the ostial portion.  Distally posterior descending artery and   posterior lateral branches fills via left to right collaterals.     PERCUTANEOUS INTERVENTION:   1. Thrombectomy with Priority One device.    PTCA with 2.5x20 mm Emerge balloon.    2. Intraaortic balloon pump placemen.  3. Temporary pacemaker balloon.     RECOMMENDATIONS:  Recommend medical stabilization and consideration for   coronary artery bypass graft surgery.        Assessment/Plan  * STEMI (ST elevation myocardial infarction) ([facility])   Assessment & Plan    Patient continues have episodes of intermittent high degree block and required pacing.  He is going to undergo biventricular AICD placement today.  This may improve his left ventricular function and he will have \"atrial kick\".  We will follow his blood pressure and hopefully be able to start medical therapy once his device has been placed.        Complete heart block by electrocardiogram ([facility])   Assessment & Plan    Discussed with EP, the patient has been scheduled for a biventricular AICD today.        Ischemic cardiomyopathy- (present on admission)   Assessment & Plan    We will increase medical therapy as tolerated once his biventricular AICD has been placed            Thank you for allowing me to participate in the care of this patient.  I will continue to follow this patient    Please contact me with any questions.    Russel Carrera M.D.   Cardiologist, [facility]  [phone]      "

## 2018-09-26 NOTE — PROGRESS NOTES
Pt to unit via cath lab RN, attached to zoll, IABP and TV pacer in place, R femoral sheath site soft, no hematoma, dressing intact w/ minimal old drainage.  IABP timing verified 1:1, pacer assessed, capture and sensing noted. Underlying rhythm 3rd degree block.  Pedal pulses 1+, pt denies N/T pain. Will continue to monitor.

## 2018-09-26 NOTE — H&P
EP Consult Note    DOS: 9/26/2018    Consulting physician: José Miguel Carrera    Chief complaint/Reason for consult: Complete heart block    HPI:  Patient is a 57 yo M who was presented with chest pain 2 days and was found to have with an anterior STEMI. He was taken emergently to the cath lab where he was found to have acute but extensive thrombosis within the LAD. Thrombectomy and balloon angioplasty was performed. Other disease include  of the RCA and 80% disease in the RI. He was evaluated by CT surgery who felt that he was too high risk for CABG. On top of all of this, he has also been in CHB currently temporarily paced. His LV function is about 15%. EP consulted for device.    ROS (+ highlighted in red):  Constitutional: Fevers/chills/fatigue/weightloss  HEENT: Blurry vision/eye pain/sore throat/hearing loss  Respiratory: Shortness of breath/cough  Cardiovascular: Chest pain/palpitations/edema/orthopnea/syncope  GI: Nausea/vomitting/diarrhea  MSK: Arthralgias/myagias/muscle weakness  Skin: Rash/sores  Neurological: Numbness/tremors/vertigo  Endocrine: Excessive thirst/polyuria/cold intolerance/heat intolerance  Psych: Depression/anxiety    Past Medical History:   Diagnosis Date   • Acute coronary syndrome ([facility]) 9/24/2018   • Complete heart block by electrocardiogram ([facility]) 9/24/2018   • Diabetes     type II   • DM (diabetes mellitus) ([facility]) 9/24/2018   • HTN (hypertension), malignant 9/24/2018   • Rheumatic fever     1974   • Smoking 9/24/2018   • STEMI (ST elevation myocardial infarction) ([facility]) 9/24/2018       Past Surgical History:   Procedure Laterality Date   • CERVICAL DISK AND FUSION ANTERIOR  4/23/2010    Performed by ESTHER BELL at [facility]S   • OTHER ORTHOPEDIC SURGERY  1975    right leg        Social History     Social History   • Marital status: Single     Spouse name: N/A   • Number of children: N/A   • Years of education: N/A     Occupational History   • Not on file.     Social History  Main Topics   • Smoking status: Current Every Day Smoker   • Smokeless tobacco: Not on file      Comment: 1 pack isabel day and a half   • Alcohol use Yes      Comment: socially   • Drug use: No   • Sexual activity: Not on file     Other Topics Concern   • Not on file     Social History Narrative   • No narrative on file       No family history on file.    No Known Allergies    Current Facility-Administered Medications   Medication Dose Route Frequency Provider Last Rate Last Dose   • insulin regular (HUMULIN R) injection 2-9 Units  2-9 Units Subcutaneous Q6HRS Jeremy M Gonda, M.D.   Stopped at 09/26/18 1200    And   • glucose 4 g chewable tablet 16 g  16 g Oral Q15 MIN PRN Jeremy M Gonda, M.D.        And   • dextrose 50% (D50W) injection 25 mL  25 mL Intravenous Q15 MIN PRN Jeremy M Gonda, M.D.       • MD Alert...ICU Electrolyte Replacement per Pharmacy   Other pharmacy to dose Jeremy M Gonda, M.D.       • enoxaparin (LOVENOX) inj 40 mg  40 mg Subcutaneous DAILY Jeremy M Gonda, M.D.   40 mg at 09/26/18 1151   • potassium phosphates 15 mmol in  mL ivpb  15 mmol Intravenous Once Jeremy M Gonda, M.D.       • magnesium sulfate IVPB premix 2 g  2 g Intravenous Once Jeremy M Gonda, M.D. 25 mL/hr at 09/26/18 1151 2 g at 09/26/18 1151   • LIDOCAINE HCL 1 % INJ SOLN            • CEFAZOLIN SODIUM 1 G INJ SOLR            • LORazepam (ATIVAN) injection 1-2 mg  1-2 mg Intravenous Q2HRS PRN Aditya Bower M.D.   1 mg at 09/25/18 0202   • thiamine (B-1) 100 mg in D5W 100 mL IVPB  100 mg Intravenous DAILY Aditya Bower M.D. 200 mL/hr at 09/26/18 0630 100 mg at 09/26/18 0630   • folic acid 1 mg in NS 50 mL IVPB  1 mg Intravenous Q24HRS Aditya Bower M.D. 100 mL/hr at 09/26/18 0630 1 mg at 09/26/18 0630   • famotidine (PEPCID) injection 20 mg  20 mg Intravenous DAILY Aditya Bower M.D.   20 mg at 09/26/18 0501   • insulin regular human (HUMULIN/NOVOLIN R) 62.5 Units in  mL infusion per protocol  0-29  Units/hr Intravenous Continuous Aditya Bower M.D.   Stopped at 09/26/18 0255   • dextrose 50% (D50W) injection 25-50 mL  12.5-25 g Intravenous PRN Aditya Bower M.D.       • lactated ringers infusion   Intravenous Continuous Aditya Bower M.D. 100 mL/hr at 09/26/18 1151     • chlorproMAZINE (THORAZINE) 25 mg in NS 50 mL IVPB  25 mg Intravenous Q6HRS PRN Jeremy M Gonda, M.D.   Stopped at 09/26/18 0002   • hyoscyamine-maalox plus-lidocaine viscous (GI COCKTAIL) oral susp 15 mL  15 mL Oral Q6HRS PRN Russel Carrera M.D.       • aspirin EC (ECOTRIN) tablet 81 mg  81 mg Oral DAILY Dimas Henderson M.D.   81 mg at 09/26/18 0500   • Respiratory Care per Protocol   Nebulization Continuous RT Galina Taveras, A.P.N.       • ALPRAZolam (XANAX) tablet 0.25 mg  0.25 mg Oral Q6HRS PRN Galina Taveras, A.P.N.   0.25 mg at 09/24/18 2128   • nitroglycerin (NITROSTAT) tablet 0.4 mg  0.4 mg Sublingual Q5 MIN PRN Manuel Gonzalez M.D.   Stopped at 09/26/18 0916   • morphine (pf) 4 mg/ml injection 2-4 mg  2-4 mg Intravenous Q5 MIN PRN Manuel Gonzalez M.D.   Stopped at 09/26/18 0914   • senna-docusate (PERICOLACE or SENOKOT S) 8.6-50 MG per tablet 2 Tab  2 Tab Oral BID Manuel Gonzalez M.D.   Stopped at 09/25/18 0600    And   • polyethylene glycol/lytes (MIRALAX) PACKET 1 Packet  1 Packet Oral QDAY PRN Manuel Gonzalez M.D.        And   • magnesium hydroxide (MILK OF MAGNESIA) suspension 30 mL  30 mL Oral QDAY PRN Manuel Gonzalez M.D.        And   • bisacodyl (DULCOLAX) suppository 10 mg  10 mg Rectal QDAY PRN Manuel Gonzalez M.D.       • labetalol (NORMODYNE,TRANDATE) injection 10 mg  10 mg Intravenous Q4HRS PRN Manuel Gonzalez M.D.       • nicotine (NICODERM) 21 MG/24HR 21 mg  21 mg Transdermal Daily-0600 Manuel Gonzalez M.D.   21 mg at 09/26/18 0500    And   • nicotine polacrilex (NICORETTE) 2 MG piece 2 mg  2 mg Oral Q HOUR PRN Manuel Gonzalez M.D.       • promethazine (PHENERGAN) tablet 25 mg   25 mg Oral Q6HRS MARTHA Gonzalez M.D.   25 mg at 09/24/18 2238   • promethazine (PHENERGAN) suppository 25 mg  25 mg Rectal Q6HRS MARTHA Gonzalez M.D.           Physical Exam:  Vitals:    09/26/18 0330 09/26/18 0400 09/26/18 0500 09/26/18 0600   BP:       Pulse: 96 93 95 80   Resp: (!) 21 14 (!) 22 (!) 30   Temp:  37 °C (98.6 °F)     SpO2:  97% 96% 94%   Weight:    80 kg (176 lb 5.9 oz)   Height:         General appearance: NAD, conversant   Eyes: anicteric sclerae, moist conjunctivae; no lid-lag; PERRLA  HENT: Atraumatic; oropharynx clear with moist mucous membranes and no mucosal ulcerations; normal hard and soft palate  Neck: Trachea midline; FROM, supple, no thyromegaly or lymphadenopathy  Lungs: CTA, with normal respiratory effort and no intercostal retractions  CV: RRR, no MRGs, no JVD   Abdomen: Soft, non-tender; no masses or HSM  Extremities: No peripheral edema or extremity lymphadenopathy  Skin: Normal temperature, turgor and texture; no rash, ulcers or subcutaneous nodules  Psych: Appropriate affect, alert and oriented to person, place and time    Data:  Labs reviewed    Prior echo/stress results reviewed:  LVEF 15%    Prior cath results reviewed:  ANGIOGRAM:  LEFT MAIN CORONARY ARTERY:  Left main coronary artery is a moderate length   large caliber vessel free of disease.  LEFT ANTERIOR DESCENDING ARTERY:  Left anterior descending artery is a long,   mild-to-moderate caliber vessel which wraps around the apex.  It is occluded   at the ostium.  Proximal to mid portion of the vessel with a long area of   dissection.  There is an early originating large diagonal branch with proximal   concentric 90% stenosis.  RAMUS INTERMEDIUS:  The ramus intermedius is a very large caliber vessel with   ostial concentric 80% stenosis.  LEFT CIRCUMFLEX ARTERY:  Left circumflex artery is a nondominant small caliber   vessel with small obtuse marginal branches noted free of disease.  RIGHT CORONARY ARTERY:   Right coronary artery is a dominant vessel which is   occluded at the ostial portion.  Distally posterior descending artery and   posterior lateral branches fills via left to right collaterals.     PERCUTANEOUS INTERVENTION:   1. Thrombectomy with Priority One device.    PTCA with 2.5x20 mm Emerge balloon.    2. Intraaortic balloon pump placemen.  3. Temporary pacemaker balloon.  CXR interpreted by me:  WNL    EKG interpreted by me:   Complete heart block, no escape    Impression/Plan:  1)Systolic CHF with severely depressed LV function  2)Ischemic cardiomyopathy  3)Anterior STEMI  4)Complete heart block    -No ventricular escape  -I do not think conduction is coming back  -He will require PPM for complete heart block  -His LV function is severely depressed, will need BiV pacing  -No plans for revascularization, unlikely his LV function will recover  -I talked to him regarding going ahead to implant destination device for him, ICD, given he is unlikely to recover his LV function > 35%, he is in agreement  -Risks/benefits/alternatives discussed, he is willing to proceed  -Keep NPO    Douglas Cole MD

## 2018-09-26 NOTE — PROGRESS NOTES
12 hour chart check completed. Assumed care, bedside report received from Audrey SYED. Pt attached to telemetry monitoring, alarm parameters set properly, drips verified, TV PM verified paced at 80, femstock to R groin. Call light w/ in reach.

## 2018-09-26 NOTE — OP REPORT
"Electrophysiology Procedure Note  [facility]    Patient ID:  Name:             Nisa Ross   YOB: 1960  Age:                 58 y.o.  male   MRN:               [id number]    Date of procedure: 9/26/2018     Procedure(s) Performed:   ICD implantation    Indication(s):  Systolic CHF  Complete heart block  CAD    Physician(s): Douglas Cole M.D.     Resident/Assistant(s): None     Anesthesia: Local and moderate sedation (start time 1258, stop time 256, total dose give 4 mg IV versed, 100 mcg IV fentanyl)     Specimen(s) Removed: None     Estimated Blood Loss:  30cc    Complications: CS dissection/perforation    DESCRIPTION OF PROCEDURE: After informed written consent, the patient was brought to the electrophysiology lab in the fasting, unsedated state. The patient was prepped and draped in the usual sterile fashion. The procedure was performed under moderate sedation with local anesthetic. A left upper extremity venogram was performed, demonstrating a patent subclavian vein. A left infraclavicular incision was made with a scalpel and the pre-pectoral device pocket was created using a combination of blunt dissection and electrocautery. The modified Seldinger technique was used to gain access to the left axillary vein x 3. A peel-away hemostasis sheath was placed in the vein. Under fluoroscopic guidance, the ICD lead was introduced into the heart. The ventricular lead was advanced into the RVOT position the pulled back and advanced to the RV apex. The lead was tested and had satisfactory sensing and pacing parameters. High output ventricular pacing did not produce extracardiac stimulation. The lead was sutured to the underlying pectoral muscle with interrupted silk over a silastic suture sleeve. The CS was accessed using the standard curve SJM CS sheath and a stiff 0.035\" glidewire. A balloon tipped venous catheter was used to perform a venogram of the CS, demonstrating a " "suitable posterolateral branch. We inserted a quadrapolar CS lead in the posterolateral branch over a 0.012\" Whisper EDS. All thresholds down this branch had unacceptable thresholds. We repositioned this lead in various sub-branches of mid-posterolateral branch and there were no satisfactory thresholds. A mid-posterior branch we tried to cannulate but given tortuous take off was unsuccessful. We explored any side branches of the AIV, but during maneuvering the sheath in the far distal GCV we dissected the coronary sinus. At this point we stopped trying to place a CS lead. The RA lead was advanced to the RAA. The lead was tested and had satisfactory sensing and pacing parameters. High output ventricular pacing did not produce extracardiac stimulation. The lead was sutured to the underlying pectoral muscle with interrupted silk over a silastic suture sleeve.     The device pocket was irrigated with antibiotic solution, inspected, and no bleeding was seen. The leads were connected to the ICD pulse generator and the device was inserted into the pocket. The wound was closed with three layers of absorbable sutures and covered with Steri-Strips. Before the patient left the room there was a trace pericardial effusion seen on echo with no evidence of hemodynamic compromise. Following recovery from sedation, the patient was transferred to a monitored bed in good condition.    IMPLANTED DEVICE INFORMATION:    Pulse generator is a MDT model MW2695-84H  Serial number [id number]    LEAD INFORMATION:  1. Right atrial lead is a MDT model RIX3933T/52, serial number [id number], P wave 0.9 millivolts, threshold 0.75 Volts at 0.5 milliseconds, pacing impedance 580 Ohms.  2. Right ventricular lead is a MDT model 7122/58, serial number [id number], R wave 5.5 millivolts, threshold 0.5 Volts at 0.5 milliseconds, pacing impedance 590 Ohms.     DEVICE PROGRAMMING:    [place] therapy: DDD   Tachy therapy:    VF zone  250 bpm, defib 36J, 40J, " 40J x 4  VT zone 190 bpm, ATP x 3 then, CDV 36 J, 40J, 40J x 2    DEFIBRILLATION THRESHOLD TESTING:    Deferred    FLUOROSCOPY TIME: 22 minutes    SUMMARY/CONCLUSIONS:  1. Successful automatic implantable cardioverter defibrillator implant.  2. Unsuccessful CS lead implantation due to unacceptably high thresholds in posterolateral wall, likely due to LV scar  3. CS dissection/perforation with trace effusion at end of case with no evidence of hemodynamic compromise    RECOMMENDATIONS:  1. Return to monitored bed.  2. Chest x-ray.  3. Limited echo in AM  4. Implantable cardioverter defibrillator interrogation prior to hospital discharge.  5. Follow-up in device clinic for wound check and device interrogation.

## 2018-09-26 NOTE — PROGRESS NOTES
Dr. Mohr called regarding EKG ST elevation, this RN informed MD regarding POC, not a candidate for surgery, AICD today, 3rd degree block/asystole, 100% paced.  Dr Mohr will contact Dr. Carrera regarding POC for this pt.

## 2018-09-26 NOTE — CARE PLAN
Problem: Safety  Goal: Will remain free from injury  Outcome: PROGRESSING AS EXPECTED  Bed alarm on, call light w/ in reach, educated regarding need to immobilizing RLE.     Problem: Knowledge Deficit  Goal: Knowledge of disease process/condition, treatment plan, diagnostic tests, and medications will improve  Outcome: PROGRESSING SLOWER THAN EXPECTED  Educated pt regarding POC, and disease process, needs reinforcement.

## 2018-09-26 NOTE — PROGRESS NOTES
Dr. Carrera called orders received to make pt NPO for AICD placement today.  This RN informed MD that pt converted into SR-ST at 0300 at that time pace maker sensing and capture was off, paced rate  despite adjustment of settings. EKG ordered and confirmed SR/ST.  Pacer wires disconnected d/t capture and sensing issues, pt converted to 3rd degree/asystole at 0517, pacer reconnected, pt 100% is  paced w/ no capture/sensing issues.

## 2018-09-26 NOTE — PROGRESS NOTES
2 RN Skin check    Monitor Summary    100% paced until 0300 when pt converted to SR/ST, converted back into 3rd Degree Block at 0521, 100% paced at this time.

## 2018-09-26 NOTE — CARE PLAN
Problem: Infection  Goal: Will remain free from infection  Outcome: PROGRESSING AS EXPECTED  Patient does not display signs and symptoms of infection     Problem: Venous Thromboembolism (VTW)/Deep Vein Thrombosis (DVT) Prevention:  Goal: Patient will participate in Venous Thrombosis (VTE)/Deep Vein Thrombosis (DVT)Prevention Measures  Outcome: PROGRESSING AS EXPECTED  SCDs  In place

## 2018-09-27 ENCOUNTER — APPOINTMENT (OUTPATIENT)
Dept: RADIOLOGY | Facility: MEDICAL CENTER | Age: 58
DRG: 222 | End: 2018-09-27
Attending: INTERNAL MEDICINE
Payer: MEDICARE

## 2018-09-27 ENCOUNTER — APPOINTMENT (OUTPATIENT)
Dept: CARDIOLOGY | Facility: MEDICAL CENTER | Age: 58
DRG: 222 | End: 2018-09-27
Attending: INTERNAL MEDICINE
Payer: MEDICARE

## 2018-09-27 LAB
ALBUMIN SERPL BCP-MCNC: 2.7 G/DL (ref 3.2–4.9)
ALBUMIN/GLOB SERPL: 0.9 G/DL
ALP SERPL-CCNC: 59 U/L (ref 30–99)
ALT SERPL-CCNC: 174 U/L (ref 2–50)
ANION GAP SERPL CALC-SCNC: 13 MMOL/L (ref 0–11.9)
ANION GAP SERPL CALC-SCNC: 13 MMOL/L (ref 0–11.9)
AST SERPL-CCNC: 85 U/L (ref 12–45)
BASOPHILS # BLD AUTO: 0.5 % (ref 0–1.8)
BASOPHILS # BLD: 0.06 K/UL (ref 0–0.12)
BILIRUB SERPL-MCNC: 0.9 MG/DL (ref 0.1–1.5)
BUN SERPL-MCNC: 22 MG/DL (ref 8–22)
BUN SERPL-MCNC: 22 MG/DL (ref 8–22)
CALCIUM SERPL-MCNC: 8.3 MG/DL (ref 8.5–10.5)
CALCIUM SERPL-MCNC: 8.3 MG/DL (ref 8.5–10.5)
CHLORIDE SERPL-SCNC: 101 MMOL/L (ref 96–112)
CHLORIDE SERPL-SCNC: 101 MMOL/L (ref 96–112)
CO2 SERPL-SCNC: 21 MMOL/L (ref 20–33)
CO2 SERPL-SCNC: 21 MMOL/L (ref 20–33)
CREAT SERPL-MCNC: 0.75 MG/DL (ref 0.5–1.4)
CREAT SERPL-MCNC: 0.75 MG/DL (ref 0.5–1.4)
EOSINOPHIL # BLD AUTO: 0.03 K/UL (ref 0–0.51)
EOSINOPHIL NFR BLD: 0.3 % (ref 0–6.9)
ERYTHROCYTE [DISTWIDTH] IN BLOOD BY AUTOMATED COUNT: 48.2 FL (ref 35.9–50)
GLOBULIN SER CALC-MCNC: 2.9 G/DL (ref 1.9–3.5)
GLUCOSE BLD-MCNC: 129 MG/DL (ref 65–99)
GLUCOSE BLD-MCNC: 132 MG/DL (ref 65–99)
GLUCOSE BLD-MCNC: 199 MG/DL (ref 65–99)
GLUCOSE BLD-MCNC: 204 MG/DL (ref 65–99)
GLUCOSE BLD-MCNC: 260 MG/DL (ref 65–99)
GLUCOSE BLD-MCNC: 266 MG/DL (ref 65–99)
GLUCOSE BLD-MCNC: 281 MG/DL (ref 65–99)
GLUCOSE SERPL-MCNC: 227 MG/DL (ref 65–99)
GLUCOSE SERPL-MCNC: 227 MG/DL (ref 65–99)
HCT VFR BLD AUTO: 36.7 % (ref 42–52)
HGB BLD-MCNC: 11.9 G/DL (ref 14–18)
IMM GRANULOCYTES # BLD AUTO: 0.05 K/UL (ref 0–0.11)
IMM GRANULOCYTES NFR BLD AUTO: 0.5 % (ref 0–0.9)
LV EJECT FRACT  99904: 25
LV EJECT FRACT MOD 2C 99903: 47.86
LV EJECT FRACT MOD 4C 99902: 46.55
LV EJECT FRACT MOD BP 99901: 46.01
LYMPHOCYTES # BLD AUTO: 1.58 K/UL (ref 1–4.8)
LYMPHOCYTES NFR BLD: 14.3 % (ref 22–41)
MAGNESIUM SERPL-MCNC: 2.1 MG/DL (ref 1.5–2.5)
MCH RBC QN AUTO: 29.8 PG (ref 27–33)
MCHC RBC AUTO-ENTMCNC: 32.4 G/DL (ref 33.7–35.3)
MCV RBC AUTO: 91.8 FL (ref 81.4–97.8)
MONOCYTES # BLD AUTO: 1.11 K/UL (ref 0–0.85)
MONOCYTES NFR BLD AUTO: 10 % (ref 0–13.4)
NEUTROPHILS # BLD AUTO: 8.22 K/UL (ref 1.82–7.42)
NEUTROPHILS NFR BLD: 74.4 % (ref 44–72)
NRBC # BLD AUTO: 0 K/UL
NRBC BLD-RTO: 0 /100 WBC
PHOSPHATE SERPL-MCNC: 3.2 MG/DL (ref 2.5–4.5)
PLATELET # BLD AUTO: 154 K/UL (ref 164–446)
PMV BLD AUTO: 10.6 FL (ref 9–12.9)
POTASSIUM SERPL-SCNC: 3.9 MMOL/L (ref 3.6–5.5)
POTASSIUM SERPL-SCNC: 3.9 MMOL/L (ref 3.6–5.5)
PROT SERPL-MCNC: 5.6 G/DL (ref 6–8.2)
RBC # BLD AUTO: 4 M/UL (ref 4.7–6.1)
SODIUM SERPL-SCNC: 135 MMOL/L (ref 135–145)
SODIUM SERPL-SCNC: 135 MMOL/L (ref 135–145)
WBC # BLD AUTO: 11.1 K/UL (ref 4.8–10.8)

## 2018-09-27 PROCEDURE — 700102 HCHG RX REV CODE 250 W/ 637 OVERRIDE(OP): Performed by: NURSE PRACTITIONER

## 2018-09-27 PROCEDURE — 84100 ASSAY OF PHOSPHORUS: CPT

## 2018-09-27 PROCEDURE — 99233 SBSQ HOSP IP/OBS HIGH 50: CPT | Performed by: INTERNAL MEDICINE

## 2018-09-27 PROCEDURE — 700102 HCHG RX REV CODE 250 W/ 637 OVERRIDE(OP): Performed by: INTERNAL MEDICINE

## 2018-09-27 PROCEDURE — 85025 COMPLETE CBC W/AUTO DIFF WBC: CPT

## 2018-09-27 PROCEDURE — 700111 HCHG RX REV CODE 636 W/ 250 OVERRIDE (IP): Performed by: INTERNAL MEDICINE

## 2018-09-27 PROCEDURE — 97162 PT EVAL MOD COMPLEX 30 MIN: CPT

## 2018-09-27 PROCEDURE — 700101 HCHG RX REV CODE 250: Performed by: INTERNAL MEDICINE

## 2018-09-27 PROCEDURE — A9270 NON-COVERED ITEM OR SERVICE: HCPCS | Performed by: FAMILY MEDICINE

## 2018-09-27 PROCEDURE — A9270 NON-COVERED ITEM OR SERVICE: HCPCS | Performed by: INTERNAL MEDICINE

## 2018-09-27 PROCEDURE — 80053 COMPREHEN METABOLIC PANEL: CPT

## 2018-09-27 PROCEDURE — A9270 NON-COVERED ITEM OR SERVICE: HCPCS | Performed by: NURSE PRACTITIONER

## 2018-09-27 PROCEDURE — 82962 GLUCOSE BLOOD TEST: CPT | Mod: 91

## 2018-09-27 PROCEDURE — G8978 MOBILITY CURRENT STATUS: HCPCS | Mod: CK

## 2018-09-27 PROCEDURE — 700102 HCHG RX REV CODE 250 W/ 637 OVERRIDE(OP): Performed by: FAMILY MEDICINE

## 2018-09-27 PROCEDURE — 700105 HCHG RX REV CODE 258: Performed by: INTERNAL MEDICINE

## 2018-09-27 PROCEDURE — G8979 MOBILITY GOAL STATUS: HCPCS | Mod: CI

## 2018-09-27 PROCEDURE — 93308 TTE F-UP OR LMTD: CPT

## 2018-09-27 PROCEDURE — 83735 ASSAY OF MAGNESIUM: CPT

## 2018-09-27 PROCEDURE — 99232 SBSQ HOSP IP/OBS MODERATE 35: CPT | Mod: 24 | Performed by: INTERNAL MEDICINE

## 2018-09-27 PROCEDURE — 770022 HCHG ROOM/CARE - ICU (200)

## 2018-09-27 RX ORDER — POTASSIUM CHLORIDE 20 MEQ/1
40 TABLET, EXTENDED RELEASE ORAL ONCE
Status: COMPLETED | OUTPATIENT
Start: 2018-09-27 | End: 2018-09-27

## 2018-09-27 RX ORDER — FUROSEMIDE 10 MG/ML
20 INJECTION INTRAMUSCULAR; INTRAVENOUS EVERY 12 HOURS
Status: DISCONTINUED | OUTPATIENT
Start: 2018-09-27 | End: 2018-09-29

## 2018-09-27 RX ORDER — FOLIC ACID 1 MG/1
1 TABLET ORAL DAILY
Status: DISCONTINUED | OUTPATIENT
Start: 2018-09-28 | End: 2018-10-05 | Stop reason: HOSPADM

## 2018-09-27 RX ORDER — HYDROCODONE BITARTRATE AND ACETAMINOPHEN 10; 325 MG/1; MG/1
1 TABLET ORAL EVERY 6 HOURS PRN
Status: DISCONTINUED | OUTPATIENT
Start: 2018-09-27 | End: 2018-10-05 | Stop reason: HOSPADM

## 2018-09-27 RX ORDER — DEXTROSE MONOHYDRATE 25 G/50ML
25 INJECTION, SOLUTION INTRAVENOUS
Status: DISCONTINUED | OUTPATIENT
Start: 2018-09-27 | End: 2018-09-29

## 2018-09-27 RX ORDER — CHLORPROMAZINE HYDROCHLORIDE 25 MG/1
25 TABLET, FILM COATED ORAL 4 TIMES DAILY PRN
Status: DISCONTINUED | OUTPATIENT
Start: 2018-09-27 | End: 2018-10-05 | Stop reason: HOSPADM

## 2018-09-27 RX ORDER — LISINOPRIL 5 MG/1
2.5 TABLET ORAL
Status: DISCONTINUED | OUTPATIENT
Start: 2018-09-27 | End: 2018-10-01

## 2018-09-27 RX ORDER — METOPROLOL SUCCINATE 50 MG/1
50 TABLET, EXTENDED RELEASE ORAL
Status: DISCONTINUED | OUTPATIENT
Start: 2018-09-27 | End: 2018-10-05 | Stop reason: HOSPADM

## 2018-09-27 RX ORDER — INSULIN GLARGINE 100 [IU]/ML
30 INJECTION, SOLUTION SUBCUTANEOUS EVERY EVENING
Status: DISCONTINUED | OUTPATIENT
Start: 2018-09-27 | End: 2018-10-01

## 2018-09-27 RX ADMIN — POTASSIUM CHLORIDE 40 MEQ: 1500 TABLET, EXTENDED RELEASE ORAL at 08:32

## 2018-09-27 RX ADMIN — STANDARDIZED SENNA CONCENTRATE AND DOCUSATE SODIUM 2 TABLET: 8.6; 5 TABLET ORAL at 05:59

## 2018-09-27 RX ADMIN — INSULIN HUMAN 2 UNITS: 100 INJECTION, SOLUTION PARENTERAL at 05:59

## 2018-09-27 RX ADMIN — FUROSEMIDE 20 MG: 10 INJECTION, SOLUTION INTRAMUSCULAR; INTRAVENOUS at 17:52

## 2018-09-27 RX ADMIN — FOLIC ACID 1 MG: 5 INJECTION, SOLUTION INTRAMUSCULAR; INTRAVENOUS; SUBCUTANEOUS at 05:58

## 2018-09-27 RX ADMIN — HYDROCODONE BITARTRATE AND ACETAMINOPHEN 1 TABLET: 10; 325 TABLET ORAL at 19:22

## 2018-09-27 RX ADMIN — NICOTINE 21 MG: 21 PATCH, EXTENDED RELEASE TRANSDERMAL at 05:58

## 2018-09-27 RX ADMIN — INSULIN GLARGINE 30 UNITS: 100 INJECTION, SOLUTION SUBCUTANEOUS at 17:56

## 2018-09-27 RX ADMIN — ENOXAPARIN SODIUM 40 MG: 100 INJECTION SUBCUTANEOUS at 05:58

## 2018-09-27 RX ADMIN — STANDARDIZED SENNA CONCENTRATE AND DOCUSATE SODIUM 2 TABLET: 8.6; 5 TABLET ORAL at 17:52

## 2018-09-27 RX ADMIN — FAMOTIDINE 20 MG: 10 INJECTION, SOLUTION INTRAVENOUS at 05:59

## 2018-09-27 RX ADMIN — FUROSEMIDE 20 MG: 10 INJECTION, SOLUTION INTRAMUSCULAR; INTRAVENOUS at 09:42

## 2018-09-27 RX ADMIN — HYDROCODONE BITARTRATE AND ACETAMINOPHEN 1 TABLET: 10; 325 TABLET ORAL at 13:44

## 2018-09-27 RX ADMIN — THIAMINE HYDROCHLORIDE 100 MG: 100 INJECTION, SOLUTION INTRAMUSCULAR; INTRAVENOUS at 06:00

## 2018-09-27 RX ADMIN — METOPROLOL SUCCINATE 50 MG: 25 TABLET, EXTENDED RELEASE ORAL at 09:47

## 2018-09-27 RX ADMIN — LISINOPRIL 2.5 MG: 5 TABLET ORAL at 09:47

## 2018-09-27 RX ADMIN — ASPIRIN 81 MG: 81 TABLET, COATED ORAL at 05:59

## 2018-09-27 ASSESSMENT — GAIT ASSESSMENTS
DISTANCE (FEET): 90
GAIT LEVEL OF ASSIST: CONTACT GUARD ASSIST
ASSISTIVE DEVICE: FRONT WHEEL WALKER

## 2018-09-27 ASSESSMENT — PAIN SCALES - GENERAL
PAINLEVEL_OUTOF10: 0
PAINLEVEL_OUTOF10: 0
PAINLEVEL_OUTOF10: 5
PAINLEVEL_OUTOF10: 2
PAINLEVEL_OUTOF10: 2
PAINLEVEL_OUTOF10: 0
PAINLEVEL_OUTOF10: 0
PAINLEVEL_OUTOF10: 3
PAINLEVEL_OUTOF10: 0
PAINLEVEL_OUTOF10: 6

## 2018-09-27 ASSESSMENT — ENCOUNTER SYMPTOMS
WHEEZING: 0
DIZZINESS: 0
AGITATION: 0
DIAPHORESIS: 0
PALPITATIONS: 0
SEIZURES: 0
HEADACHES: 0
PALPITATIONS: 1
POLYPHAGIA: 0
LIGHT-HEADEDNESS: 0
ABDOMINAL DISTENTION: 0
COUGH: 0
CHILLS: 0
FEVER: 0
SORE THROAT: 0
EYE PAIN: 0
FLANK PAIN: 0
FATIGUE: 0
MUSCULOSKELETAL NEGATIVE: 1
VOMITING: 0
NAUSEA: 0
COUGH: 1
SHORTNESS OF BREATH: 0
COLOR CHANGE: 0
PSYCHIATRIC NEGATIVE: 1
BACK PAIN: 0
ABDOMINAL PAIN: 0

## 2018-09-27 ASSESSMENT — COGNITIVE AND FUNCTIONAL STATUS - GENERAL
MOBILITY SCORE: 15
SUGGESTED CMS G CODE MODIFIER MOBILITY: CK
TURNING FROM BACK TO SIDE WHILE IN FLAT BAD: A LOT
MOVING FROM LYING ON BACK TO SITTING ON SIDE OF FLAT BED: A LITTLE
MOVING TO AND FROM BED TO CHAIR: UNABLE
WALKING IN HOSPITAL ROOM: A LITTLE
CLIMB 3 TO 5 STEPS WITH RAILING: A LITTLE
STANDING UP FROM CHAIR USING ARMS: A LITTLE

## 2018-09-27 NOTE — PROGRESS NOTES
Critical Care Progress Note    Date of admission  9/24/2018    Chief Complaint  58 y.o. male admitted 9/24/2018 with CP and SOB    Hospital Course    58 y.o. male with a past medical history of type 2 diabetes mellitus, coronary artery disease, alcohol abuse, hypertension, active cigarette smoking, presented to [facility] complaining of chest pain and shortness of breath over the last several days.  He was found to have a third-degree AV block and anterior ST elevation myocardial infarction on EKG with elevation of troponin was greater than 20.  He underwent cardiac catheterization that revealed complete thrombosis of LAD and ejection fraction 20%, and was deferred for cardiothoracic surgery evaluation for CABG.  Patient transferred to ICU with intra-aortic balloon pump in place, therefore ICU consult.  On further review of symptoms he admits to nausea and vomiting.        Interval Problem Update  Reviewed last 24 hour events:   - AICD placed yesterday   - AF   - AAox4   - garfield diet   - 800mL UOP   - improving WBC   - plts up to 154   - no BM yet --> protocol   - elevated glucose --> resume home dose lantus + SSI    Review of Systems  Review of Systems   Constitutional: Negative for diaphoresis and fatigue.   HENT: Negative for dental problem and nosebleeds.    Eyes: Negative for pain.   Respiratory: Positive for cough. Negative for shortness of breath and wheezing.    Cardiovascular: Positive for palpitations and leg swelling. Negative for chest pain.   Gastrointestinal: Negative for abdominal distention and nausea.   Endocrine: Negative for polyphagia.   Genitourinary: Negative for flank pain.   Musculoskeletal: Negative for back pain.   Skin: Negative for color change.   Allergic/Immunologic: Negative for immunocompromised state.   Neurological: Negative for light-headedness.   Psychiatric/Behavioral: Negative for agitation and behavioral problems.   All other systems reviewed and are negative.       Vital  Signs for last 24 hours   Temp:  [37.6 °C (99.7 °F)-38.1 °C (100.6 °F)] 37.9 °C (100.2 °F)  Pulse:  [] 93  Resp:  [1-32] 27    Hemodynamic parameters for last 24 hours       Vent Settings for last 24 hours   RA, IS 1000mL    Physical Exam   Physical Exam   Constitutional: He is oriented to person, place, and time. He appears well-developed and well-nourished. No distress.   HENT:   Head: Normocephalic and atraumatic.   Mouth/Throat: Oropharynx is clear and moist.   Eyes: Pupils are equal, round, and reactive to light. Conjunctivae are normal. No scleral icterus.   Neck: JVD present. No tracheal deviation present.   Cardiovascular: Normal rate, regular rhythm and intact distal pulses.   Occasional extrasystoles are present. PMI is displaced.  Exam reveals distant heart sounds. Exam reveals no friction rub and no decreased pulses.    No murmur heard.  Paced rhythm, newly placed AICD palpable chest wall with incision that is clean/dry/intact   Pulmonary/Chest: No accessory muscle usage. No tachypnea. No respiratory distress. He has no decreased breath sounds. He has no wheezes. He has rhonchi in the right lower field and the left lower field. He has rales in the right lower field and the left lower field.   Abdominal: Soft. Bowel sounds are normal. He exhibits distension. There is no tenderness. There is no guarding.   Genitourinary:   Genitourinary Comments: Sharma catheter in place   Musculoskeletal: He exhibits edema. He exhibits no tenderness.   Lymphadenopathy:     He has no cervical adenopathy.   Neurological: He is alert and oriented to person, place, and time. No cranial nerve deficit. He exhibits normal muscle tone.   Skin: Skin is warm and dry. No erythema.   Psychiatric: He has a normal mood and affect. His behavior is normal.   Nursing note and vitals reviewed.      Medications  Current Facility-Administered Medications   Medication Dose Route Frequency Provider Last Rate Last Dose   • potassium  chloride SA (Kdur) tablet 40 mEq  40 mEq Oral Once Jeremy M Gonda, M.D.       • insulin regular (HUMULIN R) injection 2-9 Units  2-9 Units Subcutaneous Q6HRS Jeremy M Gonda, M.D.   2 Units at 09/27/18 0559    And   • glucose 4 g chewable tablet 16 g  16 g Oral Q15 MIN PRN Jeremy M Gonda, M.D.        And   • dextrose 50% (D50W) injection 25 mL  25 mL Intravenous Q15 MIN PRN Jeremy M Gonda, M.D.       • MD Alert...ICU Electrolyte Replacement per Pharmacy   Other pharmacy to dose Jeremy M Gonda, M.D.       • enoxaparin (LOVENOX) inj 40 mg  40 mg Subcutaneous DAILY Jeremy M Gonda, M.D.   40 mg at 09/27/18 0558   • LORazepam (ATIVAN) injection 1-2 mg  1-2 mg Intravenous Q2HRS PRN Aditya Bower M.D.   1 mg at 09/25/18 0202   • folic acid 1 mg in NS 50 mL IVPB  1 mg Intravenous Q24HRS Aditya Bower M.D. 100 mL/hr at 09/27/18 0558 1 mg at 09/27/18 0558   • famotidine (PEPCID) injection 20 mg  20 mg Intravenous DAILY Aditya Bower M.D.   20 mg at 09/27/18 0559   • insulin regular human (HUMULIN/NOVOLIN R) 62.5 Units in  mL infusion per protocol  0-29 Units/hr Intravenous Continuous Aditya Bower M.D.   Stopped at 09/26/18 0255   • dextrose 50% (D50W) injection 25-50 mL  12.5-25 g Intravenous PRN Aditya Bower M.D.       • lactated ringers infusion   Intravenous Continuous Aditya Bower M.D. 100 mL/hr at 09/26/18 1800     • chlorproMAZINE (THORAZINE) 25 mg in NS 50 mL IVPB  25 mg Intravenous Q6HRS PRN Jeremy M Gonda, M.D.   Stopped at 09/26/18 0002   • hyoscyamine-maalox plus-lidocaine viscous (GI COCKTAIL) oral susp 15 mL  15 mL Oral Q6HRS PRN Russel Carrera M.D.       • aspirin EC (ECOTRIN) tablet 81 mg  81 mg Oral DAILY Dimas Henderson M.D.   81 mg at 09/27/18 0559   • Respiratory Care per Protocol   Nebulization Continuous RT Galina Taveras, A.P.N.       • ALPRAZolam (XANAX) tablet 0.25 mg  0.25 mg Oral Q6HRS PRN Galina Taveras, A.P.N.   0.25 mg at 09/26/18 2266   • nitroglycerin  (NITROSTAT) tablet 0.4 mg  0.4 mg Sublingual Q5 MIN MARTHA Gonzalez M.D.   Stopped at 09/26/18 0916   • morphine (pf) 4 mg/ml injection 2-4 mg  2-4 mg Intravenous Q5 MIN MARTHA Gonzalez M.D.   Stopped at 09/26/18 0914   • senna-docusate (PERICOLACE or SENOKOT S) 8.6-50 MG per tablet 2 Tab  2 Tab Oral BID Manuel Gonzalez M.D.   2 Tab at 09/27/18 0559    And   • polyethylene glycol/lytes (MIRALAX) PACKET 1 Packet  1 Packet Oral QDAY PRROLY Gonzalez M.D.        And   • magnesium hydroxide (MILK OF MAGNESIA) suspension 30 mL  30 mL Oral QDAY MARTHA Gonzalez M.D.        And   • bisacodyl (DULCOLAX) suppository 10 mg  10 mg Rectal QDAY MARTHA Gonzalez M.D.       • labetalol (NORMODYNE,TRANDATE) injection 10 mg  10 mg Intravenous Q4HRS MARTHA Gonzalez M.D.       • nicotine (NICODERM) 21 MG/24HR 21 mg  21 mg Transdermal Daily-0600 Manuel Gonzalez M.D.   21 mg at 09/27/18 0558    And   • nicotine polacrilex (NICORETTE) 2 MG piece 2 mg  2 mg Oral Q HOUR PRROLY Gonzalez M.D.       • promethazine (PHENERGAN) tablet 25 mg  25 mg Oral Q6HRS MARTHA Gonzalez M.D.   25 mg at 09/24/18 2238   • promethazine (PHENERGAN) suppository 25 mg  25 mg Rectal Q6HRS PRROLY Gonzalez M.D.           Fluids    Intake/Output Summary (Last 24 hours) at 09/27/18 0756  Last data filed at 09/27/18 0600   Gross per 24 hour   Intake          2575.21 ml   Output             1350 ml   Net          1225.21 ml       Laboratory  Recent Results (from the past 48 hour(s))   ACCU-CHEK GLUCOSE    Collection Time: 09/25/18  8:02 AM   Result Value Ref Range    Glucose - Accu-Ck 222 (H) 65 - 99 mg/dL   ACCU-CHEK GLUCOSE    Collection Time: 09/25/18  9:14 AM   Result Value Ref Range    Glucose - Accu-Ck 203 (H) 65 - 99 mg/dL   ACCU-CHEK GLUCOSE    Collection Time: 09/25/18 10:06 AM   Result Value Ref Range    Glucose - Accu-Ck 173 (H) 65 - 99 mg/dL   ACCU-CHEK GLUCOSE    Collection Time: 09/25/18  11:10 AM   Result Value Ref Range    Glucose - Accu-Ck 166 (H) 65 - 99 mg/dL   ACCU-CHEK GLUCOSE    Collection Time: 09/25/18 12:03 PM   Result Value Ref Range    Glucose - Accu-Ck 166 (H) 65 - 99 mg/dL   ACCU-CHEK GLUCOSE    Collection Time: 09/25/18  1:09 PM   Result Value Ref Range    Glucose - Accu-Ck 149 (H) 65 - 99 mg/dL   ACCU-CHEK GLUCOSE    Collection Time: 09/25/18  2:51 PM   Result Value Ref Range    Glucose - Accu-Ck 174 (H) 65 - 99 mg/dL   ACCU-CHEK GLUCOSE    Collection Time: 09/25/18  4:00 PM   Result Value Ref Range    Glucose - Accu-Ck 180 (H) 65 - 99 mg/dL   ACCU-CHEK GLUCOSE    Collection Time: 09/25/18  5:13 PM   Result Value Ref Range    Glucose - Accu-Ck 125 (H) 65 - 99 mg/dL   ACCU-CHEK GLUCOSE    Collection Time: 09/25/18  6:44 PM   Result Value Ref Range    Glucose - Accu-Ck 139 (H) 65 - 99 mg/dL   ACCU-CHEK GLUCOSE    Collection Time: 09/25/18  7:46 PM   Result Value Ref Range    Glucose - Accu-Ck 167 (H) 65 - 99 mg/dL   ACCU-CHEK GLUCOSE    Collection Time: 09/25/18  9:11 PM   Result Value Ref Range    Glucose - Accu-Ck 153 (H) 65 - 99 mg/dL   ACCU-CHEK GLUCOSE    Collection Time: 09/25/18 10:08 PM   Result Value Ref Range    Glucose - Accu-Ck 139 (H) 65 - 99 mg/dL   ACCU-CHEK GLUCOSE    Collection Time: 09/25/18 11:07 PM   Result Value Ref Range    Glucose - Accu-Ck 108 (H) 65 - 99 mg/dL   ACCU-CHEK GLUCOSE    Collection Time: 09/25/18 11:34 PM   Result Value Ref Range    Glucose - Accu-Ck 100 (H) 65 - 99 mg/dL   ACCU-CHEK GLUCOSE    Collection Time: 09/26/18 12:42 AM   Result Value Ref Range    Glucose - Accu-Ck 102 (H) 65 - 99 mg/dL   ACCU-CHEK GLUCOSE    Collection Time: 09/26/18  1:54 AM   Result Value Ref Range    Glucose - Accu-Ck 105 (H) 65 - 99 mg/dL   ACCU-CHEK GLUCOSE    Collection Time: 09/26/18  2:53 AM   Result Value Ref Range    Glucose - Accu-Ck 89 65 - 99 mg/dL   ACCU-CHEK GLUCOSE    Collection Time: 09/26/18  3:34 AM   Result Value Ref Range    Glucose - Accu-Ck 95 65 -  99 mg/dL   BASIC METABOLIC PANEL    Collection Time: 09/26/18  3:40 AM   Result Value Ref Range    Sodium 135 135 - 145 mmol/L    Potassium 3.7 3.6 - 5.5 mmol/L    Chloride 104 96 - 112 mmol/L    Co2 22 20 - 33 mmol/L    Glucose 85 65 - 99 mg/dL    Bun 27 (H) 8 - 22 mg/dL    Creatinine 0.73 0.50 - 1.40 mg/dL    Calcium 8.0 (L) 8.5 - 10.5 mg/dL    Anion Gap 9.0 0.0 - 11.9   MAGNESIUM    Collection Time: 09/26/18  3:40 AM   Result Value Ref Range    Magnesium 1.8 1.5 - 2.5 mg/dL   PHOSPHORUS    Collection Time: 09/26/18  3:40 AM   Result Value Ref Range    Phosphorus 2.2 (L) 2.5 - 4.5 mg/dL   CBC WITH DIFFERENTIAL    Collection Time: 09/26/18  3:40 AM   Result Value Ref Range    WBC 12.8 (H) 4.8 - 10.8 K/uL    RBC 3.59 (L) 4.70 - 6.10 M/uL    Hemoglobin 11.1 (L) 14.0 - 18.0 g/dL    Hematocrit 32.9 (L) 42.0 - 52.0 %    MCV 91.6 81.4 - 97.8 fL    MCH 30.9 27.0 - 33.0 pg    MCHC 33.7 33.7 - 35.3 g/dL    RDW 47.8 35.9 - 50.0 fL    Platelet Count 136 (L) 164 - 446 K/uL    MPV 10.9 9.0 - 12.9 fL    Neutrophils-Polys 73.00 (H) 44.00 - 72.00 %    Lymphocytes 17.70 (L) 22.00 - 41.00 %    Monocytes 8.30 0.00 - 13.40 %    Eosinophils 0.20 0.00 - 6.90 %    Basophils 0.20 0.00 - 1.80 %    Immature Granulocytes 0.60 0.00 - 0.90 %    Nucleated RBC 0.00 /100 WBC    Neutrophils (Absolute) 9.36 (H) 1.82 - 7.42 K/uL    Lymphs (Absolute) 2.27 1.00 - 4.80 K/uL    Monos (Absolute) 1.07 (H) 0.00 - 0.85 K/uL    Eos (Absolute) 0.02 0.00 - 0.51 K/uL    Baso (Absolute) 0.03 0.00 - 0.12 K/uL    Immature Granulocytes (abs) 0.08 0.00 - 0.11 K/uL    NRBC (Absolute) 0.00 K/uL   ESTIMATED GFR    Collection Time: 09/26/18  3:40 AM   Result Value Ref Range    GFR If African American >60 >60 mL/min/1.73 m 2    GFR If Non African American >60 >60 mL/min/1.73 m 2   EKG    Collection Time: 09/26/18  3:52 AM   Result Value Ref Range    Report       [facility]    Test Date:  2018-09-26  Pt Name:    KIRK FRITZ               Department: 161  MRN:         [id number]                      Room:       10  Gender:     Male                         Technician: DGG  :        1960                   Requested By:MAXIMO SMITH  Order #:    [id number]                    Reading MD: Minh Moreno MD    Measurements  Intervals                                Axis  Rate:       94                           P:          77  CA:         188                          QRS:        -88  QRSD:       162                          T:          82  QT:         424  QTc:        531    Interpretive Statements  SINUS RHYTHM  RIGHT BUNDLE BRANCH BLOCK  ANTEROLATERAL INFARCT, ACUTE  LEFT ANTERIOR FASCICULAR BLOCK    Electronically Signed On 2018 6:42:59 PDT by Minh Moreno MD     ACCU-CHEK GLUCOSE    Collection Time: 18  4:15 AM   Result Value Ref Range    Glucose - Accu-Ck 94 65 - 99 mg/dL   ACCU-CHEK GLUCOSE    Collection Time: 18  4:59 AM   Result Value Ref Range    Glucose - Accu-Ck 101 (H) 65 - 99 mg/dL   ACCU-CHEK GLUCOSE    Collection Time: 18  6:33 AM   Result Value Ref Range    Glucose - Accu-Ck 123 (H) 65 - 99 mg/dL   ACCU-CHEK GLUCOSE    Collection Time: 18  7:58 AM   Result Value Ref Range    Glucose - Accu-Ck 153 (H) 65 - 99 mg/dL   EKG    Collection Time: 18  9:05 AM   Result Value Ref Range    Report       [facility]    Test Date:  2018  Pt Name:    KIRK FRITZ               Department: 161  MRN:        [id number]                      Room:       10  Gender:     Male                         Technician: [facility]  :        1960                   Requested By:JEREMY M GONDA  Order #:    [id number]                    Reading MD: Jimbo Farfan MD    Measurements  Intervals                                Axis  Rate:       80                           P:          53  CA:         164                          QRS:        -72  QRSD:       162                          T:          88  QT:         432  QTc:         499    Interpretive Statements  VENTRICULAR-PACED RHYTHM  SINUS TACHYCARDIA  COMPLETE HEART BLOCK  Compared to ECG 2018 03:52:14  Sinus rhythm no longer present  PACED RHYTHM NOW PRESENT    Electronically Signed On 2018 18:17:25 PDT by Jimbo Farfan MD     EKG    Collection Time: 18  9:15 AM   Result Value Ref Range    Report       [facility]    Test Date:  2018  Pt Name:    KIRK FRITZ               Department: 161  MRN:        [id number]                      Room:       T610  Gender:     Male                         Technician: [facility]  :        1960                   Requested By:FRANCISCO C GONDA  Order #:    [id number]                    Reading MD: Jimbo Farfan MD    Measurements  Intervals                                Axis  Rate:       69                           P:          54  IL:         336                          QRS:        -74  QRSD:       158                          T:          89  QT:         432  QTc:        463    Interpretive Statements  VENTRICULAR-PACED RHYTHM  SINUS TACHYCARDIA  COMPLETE HEART BLOCK  CONSIDER PACEMAKER TESTING VS MALFUNCTION.  Compared to ECG 2018 09:05:09  THERE IS A LAPSE IN THE VENTRICULAR PACING    Electronically Signed On 2018 18:18:34 PDT by Jimbo Farfan MD     ACCU-CHEK GLUCOSE    Collection Time: 18 10:49 AM   Result Value Ref Range    Glucose - Accu-Ck 139 (H) 65 - 99 mg/dL   ACCU-CHEK GLUCOSE    Collection Time: 18 11:54 AM   Result Value Ref Range    Glucose - Accu-Ck 137 (H) 65 - 99 mg/dL   EC-ECHOCARDIOGRAM LTD W/O CONT    Collection Time: 18  3:15 PM   Result Value Ref Range    Left Ventrical Ejection Fraction 25    ACCU-CHEK GLUCOSE    Collection Time: 18  4:18 PM   Result Value Ref Range    Glucose - Accu-Ck 129 (H) 65 - 99 mg/dL   ACCU-CHEK GLUCOSE    Collection Time: 18  5:43 PM   Result Value Ref Range    Glucose - Accu-Ck 132 (H) 65 - 99 mg/dL   ACCU-CHEK GLUCOSE     Collection Time: 09/26/18 11:36 PM   Result Value Ref Range    Glucose - Accu-Ck 204 (H) 65 - 99 mg/dL   ACCU-CHEK GLUCOSE    Collection Time: 09/27/18  5:56 AM   Result Value Ref Range    Glucose - Accu-Ck 199 (H) 65 - 99 mg/dL   CBC WITH DIFFERENTIAL    Collection Time: 09/27/18  6:00 AM   Result Value Ref Range    WBC 11.1 (H) 4.8 - 10.8 K/uL    RBC 4.00 (L) 4.70 - 6.10 M/uL    Hemoglobin 11.9 (L) 14.0 - 18.0 g/dL    Hematocrit 36.7 (L) 42.0 - 52.0 %    MCV 91.8 81.4 - 97.8 fL    MCH 29.8 27.0 - 33.0 pg    MCHC 32.4 (L) 33.7 - 35.3 g/dL    RDW 48.2 35.9 - 50.0 fL    Platelet Count 154 (L) 164 - 446 K/uL    MPV 10.6 9.0 - 12.9 fL    Neutrophils-Polys 74.40 (H) 44.00 - 72.00 %    Lymphocytes 14.30 (L) 22.00 - 41.00 %    Monocytes 10.00 0.00 - 13.40 %    Eosinophils 0.30 0.00 - 6.90 %    Basophils 0.50 0.00 - 1.80 %    Immature Granulocytes 0.50 0.00 - 0.90 %    Nucleated RBC 0.00 /100 WBC    Neutrophils (Absolute) 8.22 (H) 1.82 - 7.42 K/uL    Lymphs (Absolute) 1.58 1.00 - 4.80 K/uL    Monos (Absolute) 1.11 (H) 0.00 - 0.85 K/uL    Eos (Absolute) 0.03 0.00 - 0.51 K/uL    Baso (Absolute) 0.06 0.00 - 0.12 K/uL    Immature Granulocytes (abs) 0.05 0.00 - 0.11 K/uL    NRBC (Absolute) 0.00 K/uL   BASIC METABOLIC PANEL    Collection Time: 09/27/18  6:00 AM   Result Value Ref Range    Sodium 135 135 - 145 mmol/L    Potassium 3.9 3.6 - 5.5 mmol/L    Chloride 101 96 - 112 mmol/L    Co2 21 20 - 33 mmol/L    Glucose 227 (H) 65 - 99 mg/dL    Bun 22 8 - 22 mg/dL    Creatinine 0.75 0.50 - 1.40 mg/dL    Calcium 8.3 (L) 8.5 - 10.5 mg/dL    Anion Gap 13.0 (H) 0.0 - 11.9   MAGNESIUM    Collection Time: 09/27/18  6:00 AM   Result Value Ref Range    Magnesium 2.1 1.5 - 2.5 mg/dL   PHOSPHORUS    Collection Time: 09/27/18  6:00 AM   Result Value Ref Range    Phosphorus 3.2 2.5 - 4.5 mg/dL   ESTIMATED GFR    Collection Time: 09/27/18  6:00 AM   Result Value Ref Range    GFR If African American >60 >60 mL/min/1.73 m 2    GFR If Non African  American >60 >60 mL/min/1.73 m 2       Imaging  X-Ray:  No film today    Assessment/Plan  * STEMI (ST elevation myocardial infarction) (HCC)   Assessment & Plan    S/P angioplasty 9/24  Continue aspirin and statin  Analgesia  Beta-blocker        Cardiogenic shock (HCC)   Assessment & Plan    Resolved        Diabetic ketoacidosis associated with type 2 diabetes mellitus (HCC)- (present on admission)   Assessment & Plan    Improved  Glucose control with long and short acting insulin  Diabetic diet and education        Complete heart block by electrocardiogram ([facility])   Assessment & Plan    S/P AICD/pacer placed on 9/26  Optimize electrolytes        Acute-on-chronic kidney injury (HCC)- (present on admission)   Assessment & Plan    likely ATN -improving  Avoid nephrotoxins  Daily BMP and monitor urine output        Hyponatremia- (present on admission)   Assessment & Plan    Resolved        Leukocytosis- (present on admission)   Assessment & Plan    Likely reactive, doubt infection, improving again today  Monitor off antibiotics currently        Elevated LFTs- (present on admission)   Assessment & Plan    Secondary to hepatic congestion and heart failure/shock  Avoid hepatotoxins  Recheck liver enzymes today          Hypomagnesemia   Assessment & Plan    Repletion and monitor daily        Hypophosphatemia   Assessment & Plan    Repletion and monitor daily        Hypokalemia   Assessment & Plan    Repletion and monitor daily        Thrombocytopenia (HCC)   Assessment & Plan    Improving   Daily CBC        Ischemic cardiomyopathy- (present on admission)   Assessment & Plan    Beta-blocker, ACE inhibitor, diuretic        Hyperbilirubinemia- (present on admission)   Assessment & Plan    Secondary to hepatic congestion, monitor        HTN (hypertension), malignant   Assessment & Plan    Beta-blocker, ACE inhibitor  Goal systolic blood pressure less than 160        Smoking   Assessment & Plan    Tobacco cessation education              VTE:  Lovenox  Ulcer: Not Indicated  Lines: Central Line  Ongoing indication addressed    I have performed a physical exam and reviewed and updated ROS and Plan today (9/27/2018). In review of yesterday's note (9/26/2018), there are no changes except as documented above.      Ok to transfer patient out of ICU today. [facility] will sign off at transfer. Please call with questions.    Discussed patient condition and risk of morbidity and/or mortality with Hospitalist, RN, RT, Pharmacy, , Charge nurse / hot rounds, Patient and cardiology

## 2018-09-27 NOTE — PROGRESS NOTES
Cardiology Follow Up Progress Note    Date of Service  9/27/2018    Attending Physician  Ran Page M.D.    Chief Complaint   CHB    HPI  Nisa Ross is a 58 y.o. male admitted 9/24/2018 with anterior STEMI.  Patient seen in consultation by Dr Cole with the following recommendations:  Patient is a 57 yo M who was presented with chest pain 2 days and was found to have with an anterior STEMI. He was taken emergently to the cath lab where he was found to have acute but extensive thrombosis within the LAD. Thrombectomy and balloon angioplasty was performed. Other disease include  of the RCA and 80% disease in the RI. He was evaluated by CT surgery who felt that he was too high risk for CABG. On top of all of this, he has also been in CHB currently temporarily paced. His LV function is about 15%. EP consulted for device.  Impression/Plan:  1)Systolic CHF with severely depressed LV function  2)Ischemic cardiomyopathy  3)Anterior STEMI  4)Complete heart block     -No ventricular escape  -I do not think conduction is coming back  -He will require PPM for complete heart block  -His LV function is severely depressed, will need BiV pacing  -No plans for revascularization, unlikely his LV function will recover  -I talked to him regarding going ahead to implant destination device for him, ICD, given he is unlikely to recover his LV function > 35%, he is in agreement  -Risks/benefits/alternatives discussed, he is willing to proceed  Device implantation with the following results:    IMPLANTED DEVICE INFORMATION:    Pulse generator is a MDT model ZX1821-97H  Serial number [id number]    LEAD INFORMATION:  1. Right atrial lead is a MDT model EJP9077E/52, serial number [id number], P wave 0.9 millivolts, threshold 0.75 Volts at 0.5 milliseconds, pacing impedance 580 Ohms.  2. Right ventricular lead is a MDT model 7122/58, serial number [id number], R wave 5.5 millivolts, threshold 0.5 Volts at 0.5 milliseconds, pacing  impedance 590 Ohms.     DEVICE PROGRAMMING:    [place] therapy: DDD   Tachy therapy:    VF zone  250 bpm, defib 36J, 40J, 40J x 4  VT zone 190 bpm, ATP x 3 then, CDV 36 J, 40J, 40J x 2    DEFIBRILLATION THRESHOLD TESTING:    Deferred     FLUOROSCOPY TIME: 22 minutes    SUMMARY/CONCLUSIONS:  1. Successful automatic implantable cardioverter defibrillator implant.  2. Unsuccessful CS lead implantation due to unacceptably high thresholds in posterolateral wall, likely due to LV scar  3. CS dissection/perforation with trace effusion at end of case with no evidence of hemodynamic compromise    Impression         1.  There is a left sided pacemaker in good position with its distal leads overlying the right atrium and right ventricle.    2.  No evidence of pneumothorax.       Interim Events  Device implantation 9/26/18  Pacemaker interrogation intact. Thresholds stable at 0.5V.    Review of Systems   Constitutional: Negative for chills and fever.   HENT: Negative for congestion and sore throat.    Respiratory: Negative for cough, shortness of breath and wheezing.    Cardiovascular: Positive for chest pain (At device implantation site and left shoulder). Negative for palpitations and leg swelling.   Gastrointestinal: Negative for abdominal pain, nausea and vomiting.   Genitourinary: Negative for dysuria, flank pain and frequency.   Musculoskeletal: Negative.    Skin: Negative.    Neurological: Negative for dizziness, seizures and headaches.   Psychiatric/Behavioral: Negative.        Vital signs in last 24 hours  Temp:  [37.6 °C (99.7 °F)-38.1 °C (100.6 °F)] 37.9 °C (100.2 °F)  Pulse:  [] 93  Resp:  [1-32] 27    Physical Exam  Physical Exam   Constitutional: He is oriented to person, place, and time. He appears well-developed and well-nourished.   HENT:   Head: Normocephalic and atraumatic.   Poor dentition   Eyes: Pupils are equal, round, and reactive to light.   Neck: Normal range of motion. Neck supple. No  thyromegaly present.   Cardiovascular: Normal rate and regular rhythm.  Exam reveals no gallop and no friction rub.    No murmur heard.  Pulmonary/Chest: Effort normal and breath sounds normal. No respiratory distress. He has no wheezes. He has no rales.   Site uncomplicated.   Abdominal: Soft. Bowel sounds are normal. He exhibits no distension. There is no tenderness. There is no guarding.   Musculoskeletal: Normal range of motion. He exhibits no edema.   Neurological: He is alert and oriented to person, place, and time.   Skin: Skin is warm and dry.   Psychiatric: He has a normal mood and affect.       Lab Review  Lab Results   Component Value Date/Time    WBC 11.1 (H) 09/27/2018 06:00 AM    RBC 4.00 (L) 09/27/2018 06:00 AM    HEMOGLOBIN 11.9 (L) 09/27/2018 06:00 AM    HEMATOCRIT 36.7 (L) 09/27/2018 06:00 AM    MCV 91.8 09/27/2018 06:00 AM    MCH 29.8 09/27/2018 06:00 AM    MCHC 32.4 (L) 09/27/2018 06:00 AM    MPV 10.6 09/27/2018 06:00 AM      Lab Results   Component Value Date/Time    SODIUM 135 09/27/2018 06:00 AM    POTASSIUM 3.9 09/27/2018 06:00 AM    CHLORIDE 101 09/27/2018 06:00 AM    CO2 21 09/27/2018 06:00 AM    GLUCOSE 227 (H) 09/27/2018 06:00 AM    BUN 22 09/27/2018 06:00 AM    CREATININE 0.75 09/27/2018 06:00 AM      Lab Results   Component Value Date/Time    ASTSGOT 297 (H) 09/25/2018 01:28 AM    ALTSGPT 204 (H) 09/25/2018 01:28 AM     Lab Results   Component Value Date/Time    CHOLSTRLTOT 128 09/25/2018 01:28 AM    LDL 85 09/25/2018 01:28 AM    HDL 24 (A) 09/25/2018 01:28 AM    TRIGLYCERIDE 94 09/25/2018 01:28 AM    TROPONINI 43.85 (H) 09/25/2018 01:28 AM       Recent Labs      09/24/18   1722   BNPBTYPENAT  633*       Cardiac Imaging and Procedures Review  EKG:  My personal interpretation of the EKG dated 9/27/18:  Sinus tachycardia    Echocardiogram:  9/26/18  CONCLUSIONS  This is a limited echo done to r/o pericardial effusion. LV function   remains moderately to severely depressed with regional  wall motion   abnormalities. There is a trace effusion without evidence of   hemodynamic compromise.  Cardiac Catheterization:    ANGIOGRAM:  LEFT MAIN CORONARY ARTERY:  Left main coronary artery is a moderate length   large caliber vessel free of disease.  LEFT ANTERIOR DESCENDING ARTERY:  Left anterior descending artery is a long,   mild-to-moderate caliber vessel which wraps around the apex.  It is occluded   at the ostium.  Proximal to mid portion of the vessel with a long area of   dissection.  There is an early originating large diagonal branch with proximal   concentric 90% stenosis.  RAMUS INTERMEDIUS:  The ramus intermedius is a very large caliber vessel with   ostial concentric 80% stenosis.  LEFT CIRCUMFLEX ARTERY:  Left circumflex artery is a nondominant small caliber   vessel with small obtuse marginal branches noted free of disease.  RIGHT CORONARY ARTERY:  Right coronary artery is a dominant vessel which is   occluded at the ostial portion.  Distally posterior descending artery and   posterior lateral branches fills via left to right collaterals.     PERCUTANEOUS INTERVENTION:   1. Thrombectomy with Priority One device.    PTCA with 2.5x20 mm Emerge balloon.    2. Intraaortic balloon pump placemen.  3. Temporary pacemaker balloon.  Imaging  Chest X-Ray:    1.  There is a left sided pacemaker in good position with its distal leads overlying the right atrium and right ventricle.  2.  No evidence of pneumothorax.      Assessment/Plan  1. CHB dual chamber ICD inserted 9/26/18.  2. Post MI medical therapy recommended.  Device interrogation intact.  CXR no late Ptx.  EPS will sign off   Immobilizer ordered.  ICD restrictions reviewed with patient. Do not raise affected arm above head or behind back for six weeks. May remove arm sling after 24 hrs, please wear if trouble remembering arm limitations and prn at night. No heavy lifting/pushing for six weeks. No driving for first week. No showers first week.  Keep dressing on, clean, and dry until sees us in follow up. Wound care reviewed. Instructed to report s/s of infection such as warmth/redness/drainage/swelling at site or fever/chills.  ICD therapy reviewed with patient: 1 shock: if feeling fine can notify cardiology office and be seen for interrogation.  If feeling poorly needs to call 911.  2 Shocks in 24 hours: call 911.  Patient verbalizes understanding.   Will arrange for follow up in our office .  Thank you for allowing me to participate in the care of this patient.      Please contact me with any questions.    JAMES Carr.   Cardiologist, [facility]  [phone]

## 2018-09-27 NOTE — PROGRESS NOTES
Cardiology Follow Up Progress Note    Date of Service  9/27/2018    Attending Physician  Ran Page M.D.    Chief Complaint   Chest pain    HPI  Nisa Ross is a 58 y.o. male admitted 9/24/2018 with an ST segment elevation myocardial infarction was found to have thrombosis of the LAD.  He was also in third-degree AV block and had a severe reduction of ejection fraction.  His chest pain had been going on for days.    Interim Events  Patient underwent cardiac catheterization.  He underwent thrombectomy and PCTA of the LAD.  The LAD was noted to be dissected.  He had CT of the RCA and 80% disease in the ramus intermedius.  An intra-aortic balloon pump was placed.  Cardiovascular surgery was consulted.  This morning a discussion was held with Dr. Hightower with myself.  He felt that the patient was too high risk and would have limited benefit from revascularization of the LAD.  Surgery was therefore canceled.      Patient is post AICD placement.  A left ventricular lead was unable to be placed and he did have perforation/dissection of the coronary sinus.    He is maintaining a sinus rhythm with normal AV conduction at the present time.    Review of Systems  Review of Systems   Respiratory: Negative for shortness of breath.    Cardiovascular: Negative for chest pain and palpitations.       Vital signs in last 24 hours  Temp:  [37.9 °C (100.2 °F)-38.1 °C (100.6 °F)] 37.9 °C (100.2 °F)  Pulse:  [] 93  Resp:  [1-32] 27    Physical Exam  Physical Exam   Neck: No JVD present.   Cardiovascular: Normal rate and regular rhythm.  Exam reveals no gallop.    No murmur heard.  Pulmonary/Chest: Effort normal. He has no rales.   Abdominal: Soft. There is no tenderness.   Musculoskeletal: He exhibits no edema.       Lab Review  Lab Results   Component Value Date/Time    WBC 11.1 (H) 09/27/2018 06:00 AM    RBC 4.00 (L) 09/27/2018 06:00 AM    HEMOGLOBIN 11.9 (L) 09/27/2018 06:00 AM    HEMATOCRIT 36.7 (L) 09/27/2018  06:00 AM    MCV 91.8 09/27/2018 06:00 AM    MCH 29.8 09/27/2018 06:00 AM    MCHC 32.4 (L) 09/27/2018 06:00 AM    MPV 10.6 09/27/2018 06:00 AM      Lab Results   Component Value Date/Time    SODIUM 135 09/27/2018 06:00 AM    POTASSIUM 3.9 09/27/2018 06:00 AM    CHLORIDE 101 09/27/2018 06:00 AM    CO2 21 09/27/2018 06:00 AM    GLUCOSE 227 (H) 09/27/2018 06:00 AM    BUN 22 09/27/2018 06:00 AM    CREATININE 0.75 09/27/2018 06:00 AM      Lab Results   Component Value Date/Time    ASTSGOT 297 (H) 09/25/2018 01:28 AM    ALTSGPT 204 (H) 09/25/2018 01:28 AM     Lab Results   Component Value Date/Time    CHOLSTRLTOT 128 09/25/2018 01:28 AM    LDL 85 09/25/2018 01:28 AM    HDL 24 (A) 09/25/2018 01:28 AM    TRIGLYCERIDE 94 09/25/2018 01:28 AM    TROPONINI 43.85 (H) 09/25/2018 01:28 AM       Recent Labs      09/24/18   1722   BNPBTYPENAT  633*       Cardiac Imaging and Procedures Review  EKG:  My personal interpretation of the EKG dated 9/25 is ventricular paced with complete AV block and no ventricular escape and the pacemaker is turned off.    Echocardiogram:   CONCLUSIONS  No prior study is available for comparison.   Severely reduced left ventricular systolic function. Left ventricular   ejection fraction is visually estimated to be 15%.  Only preserved wall motion at the basal anterior wall segment.  No evidence of valvular abnormality based on Doppler evaluation.        KIRK FRITZ  Exam Date:         09/24/2018     Cardiac Catheterization:    ANGIOGRAM:  LEFT MAIN CORONARY ARTERY:  Left main coronary artery is a moderate length   large caliber vessel free of disease.  LEFT ANTERIOR DESCENDING ARTERY:  Left anterior descending artery is a long,   mild-to-moderate caliber vessel which wraps around the apex.  It is occluded   at the ostium.  Proximal to mid portion of the vessel with a long area of   dissection.  There is an early originating large diagonal branch with proximal   concentric 90% stenosis.  RAMUS  INTERMEDIUS:  The ramus intermedius is a very large caliber vessel with   ostial concentric 80% stenosis.  LEFT CIRCUMFLEX ARTERY:  Left circumflex artery is a nondominant small caliber   vessel with small obtuse marginal branches noted free of disease.  RIGHT CORONARY ARTERY:  Right coronary artery is a dominant vessel which is   occluded at the ostial portion.  Distally posterior descending artery and   posterior lateral branches fills via left to right collaterals.     PERCUTANEOUS INTERVENTION:   1. Thrombectomy with Priority One device.    PTCA with 2.5x20 mm Emerge balloon.    2. Intraaortic balloon pump placemen.  3. Temporary pacemaker balloon.     RECOMMENDATIONS:  Recommend medical stabilization and consideration for   coronary artery bypass graft surgery.        Assessment/Plan  * STEMI (ST elevation myocardial infarction) ([facility])   Assessment & Plan    Patient is now post AICD placement.  Unfortunately, a left ventricular lead was unable to be placed.  He did have a coronary sinus perforation/dissection.  A repeat echocardiogram is going to be performed this morning.  He has had return of AV conduction.  At this time, we will start medical therapy for his cardiomyopathy.  If his heart rate drops down to 60 we may need to reduce the backup rate of the AICD.  His blood pressure appears to be improved.        Complete heart block by electrocardiogram ([facility])   Assessment & Plan    Patient is post AICD placement.  Again, a left ventricular lead was unable to be placed.  Repeat limited echo will be performed this morning to reevaluate for pericardial effusion.        Ischemic cardiomyopathy- (present on admission)   Assessment & Plan    We will start beta-blocker therapy and ACE inhibitor therapy today.            Thank you for allowing me to participate in the care of this patient.  I will continue to follow this patient    Please contact me with any questions.    Russel Carrera M.D.   Cardiologist, [facility]  [place] [facility]  [phone]

## 2018-09-27 NOTE — PROGRESS NOTES
Monitor Summary:  Rhythm-  HR-70S-100s    Ectopy-  Measurements- .20/.12/.44      12-Hour chart check  2 RN skin assessment

## 2018-09-28 ENCOUNTER — APPOINTMENT (OUTPATIENT)
Dept: RADIOLOGY | Facility: MEDICAL CENTER | Age: 58
DRG: 222 | End: 2018-09-28
Attending: INTERNAL MEDICINE
Payer: MEDICARE

## 2018-09-28 LAB
ANION GAP SERPL CALC-SCNC: 9 MMOL/L (ref 0–11.9)
BASOPHILS # BLD AUTO: 0.5 % (ref 0–1.8)
BASOPHILS # BLD: 0.06 K/UL (ref 0–0.12)
BUN SERPL-MCNC: 17 MG/DL (ref 8–22)
CALCIUM SERPL-MCNC: 8.2 MG/DL (ref 8.5–10.5)
CHLORIDE SERPL-SCNC: 98 MMOL/L (ref 96–112)
CO2 SERPL-SCNC: 26 MMOL/L (ref 20–33)
CREAT SERPL-MCNC: 0.72 MG/DL (ref 0.5–1.4)
EOSINOPHIL # BLD AUTO: 0.15 K/UL (ref 0–0.51)
EOSINOPHIL NFR BLD: 1.2 % (ref 0–6.9)
ERYTHROCYTE [DISTWIDTH] IN BLOOD BY AUTOMATED COUNT: 48.5 FL (ref 35.9–50)
GLUCOSE BLD-MCNC: 131 MG/DL (ref 65–99)
GLUCOSE BLD-MCNC: 169 MG/DL (ref 65–99)
GLUCOSE BLD-MCNC: 203 MG/DL (ref 65–99)
GLUCOSE BLD-MCNC: 216 MG/DL (ref 65–99)
GLUCOSE SERPL-MCNC: 154 MG/DL (ref 65–99)
HCT VFR BLD AUTO: 40.6 % (ref 42–52)
HGB BLD-MCNC: 13.1 G/DL (ref 14–18)
IMM GRANULOCYTES # BLD AUTO: 0.06 K/UL (ref 0–0.11)
IMM GRANULOCYTES NFR BLD AUTO: 0.5 % (ref 0–0.9)
LYMPHOCYTES # BLD AUTO: 1.58 K/UL (ref 1–4.8)
LYMPHOCYTES NFR BLD: 12.3 % (ref 22–41)
MAGNESIUM SERPL-MCNC: 1.9 MG/DL (ref 1.5–2.5)
MCH RBC QN AUTO: 30 PG (ref 27–33)
MCHC RBC AUTO-ENTMCNC: 32.3 G/DL (ref 33.7–35.3)
MCV RBC AUTO: 92.9 FL (ref 81.4–97.8)
MONOCYTES # BLD AUTO: 1.83 K/UL (ref 0–0.85)
MONOCYTES NFR BLD AUTO: 14.2 % (ref 0–13.4)
NEUTROPHILS # BLD AUTO: 9.21 K/UL (ref 1.82–7.42)
NEUTROPHILS NFR BLD: 71.3 % (ref 44–72)
NRBC # BLD AUTO: 0 K/UL
NRBC BLD-RTO: 0 /100 WBC
PHOSPHATE SERPL-MCNC: 3.1 MG/DL (ref 2.5–4.5)
PLATELET # BLD AUTO: 160 K/UL (ref 164–446)
PMV BLD AUTO: 10.5 FL (ref 9–12.9)
POTASSIUM SERPL-SCNC: 4.1 MMOL/L (ref 3.6–5.5)
RBC # BLD AUTO: 4.37 M/UL (ref 4.7–6.1)
SODIUM SERPL-SCNC: 133 MMOL/L (ref 135–145)
WBC # BLD AUTO: 12.9 K/UL (ref 4.8–10.8)

## 2018-09-28 PROCEDURE — 93010 ELECTROCARDIOGRAM REPORT: CPT | Performed by: INTERNAL MEDICINE

## 2018-09-28 PROCEDURE — 700111 HCHG RX REV CODE 636 W/ 250 OVERRIDE (IP): Performed by: INTERNAL MEDICINE

## 2018-09-28 PROCEDURE — 700102 HCHG RX REV CODE 250 W/ 637 OVERRIDE(OP): Performed by: NURSE PRACTITIONER

## 2018-09-28 PROCEDURE — A9270 NON-COVERED ITEM OR SERVICE: HCPCS | Performed by: NURSE PRACTITIONER

## 2018-09-28 PROCEDURE — 93005 ELECTROCARDIOGRAM TRACING: CPT | Performed by: INTERNAL MEDICINE

## 2018-09-28 PROCEDURE — A9270 NON-COVERED ITEM OR SERVICE: HCPCS | Performed by: FAMILY MEDICINE

## 2018-09-28 PROCEDURE — 85025 COMPLETE CBC W/AUTO DIFF WBC: CPT

## 2018-09-28 PROCEDURE — 700102 HCHG RX REV CODE 250 W/ 637 OVERRIDE(OP): Performed by: INTERNAL MEDICINE

## 2018-09-28 PROCEDURE — 99232 SBSQ HOSP IP/OBS MODERATE 35: CPT | Mod: 24 | Performed by: INTERNAL MEDICINE

## 2018-09-28 PROCEDURE — A9270 NON-COVERED ITEM OR SERVICE: HCPCS | Performed by: INTERNAL MEDICINE

## 2018-09-28 PROCEDURE — 700105 HCHG RX REV CODE 258: Performed by: INTERNAL MEDICINE

## 2018-09-28 PROCEDURE — 700101 HCHG RX REV CODE 250

## 2018-09-28 PROCEDURE — 770022 HCHG ROOM/CARE - ICU (200)

## 2018-09-28 PROCEDURE — 80048 BASIC METABOLIC PNL TOTAL CA: CPT

## 2018-09-28 PROCEDURE — 84100 ASSAY OF PHOSPHORUS: CPT

## 2018-09-28 PROCEDURE — 700102 HCHG RX REV CODE 250 W/ 637 OVERRIDE(OP): Performed by: FAMILY MEDICINE

## 2018-09-28 PROCEDURE — 83735 ASSAY OF MAGNESIUM: CPT

## 2018-09-28 PROCEDURE — 99233 SBSQ HOSP IP/OBS HIGH 50: CPT | Performed by: INTERNAL MEDICINE

## 2018-09-28 PROCEDURE — 82962 GLUCOSE BLOOD TEST: CPT | Mod: 91

## 2018-09-28 RX ORDER — DIGOXIN 0.25 MG/ML
INJECTION INTRAMUSCULAR; INTRAVENOUS
Status: ACTIVE
Start: 2018-09-28 | End: 2018-09-29

## 2018-09-28 RX ORDER — MAGNESIUM SULFATE HEPTAHYDRATE 40 MG/ML
2 INJECTION, SOLUTION INTRAVENOUS ONCE
Status: COMPLETED | OUTPATIENT
Start: 2018-09-28 | End: 2018-09-28

## 2018-09-28 RX ORDER — ACETAMINOPHEN 325 MG/1
650 TABLET ORAL EVERY 6 HOURS PRN
Status: DISCONTINUED | OUTPATIENT
Start: 2018-09-28 | End: 2018-10-05 | Stop reason: HOSPADM

## 2018-09-28 RX ORDER — DEXTROSE MONOHYDRATE 50 MG/ML
INJECTION, SOLUTION INTRAVENOUS CONTINUOUS
Status: DISCONTINUED | OUTPATIENT
Start: 2018-09-28 | End: 2018-09-29

## 2018-09-28 RX ORDER — METOPROLOL TARTRATE 1 MG/ML
5 INJECTION, SOLUTION INTRAVENOUS ONCE
Status: ACTIVE | OUTPATIENT
Start: 2018-09-28 | End: 2018-09-29

## 2018-09-28 RX ORDER — DIGOXIN 0.25 MG/ML
500 INJECTION INTRAMUSCULAR; INTRAVENOUS ONCE
Status: COMPLETED | OUTPATIENT
Start: 2018-09-28 | End: 2018-09-28

## 2018-09-28 RX ORDER — METOPROLOL TARTRATE 1 MG/ML
INJECTION, SOLUTION INTRAVENOUS
Status: COMPLETED
Start: 2018-09-28 | End: 2018-09-28

## 2018-09-28 RX ADMIN — POLYETHYLENE GLYCOL 3350 1 PACKET: 17 POWDER, FOR SOLUTION ORAL at 11:20

## 2018-09-28 RX ADMIN — DIGOXIN 500 MCG: 0.25 INJECTION INTRAMUSCULAR; INTRAVENOUS at 18:10

## 2018-09-28 RX ADMIN — METOPROLOL TARTRATE 5 MG: 5 INJECTION INTRAVENOUS at 18:00

## 2018-09-28 RX ADMIN — FOLIC ACID 1 MG: 1 TABLET ORAL at 06:09

## 2018-09-28 RX ADMIN — INSULIN GLARGINE 30 UNITS: 100 INJECTION, SOLUTION SUBCUTANEOUS at 17:41

## 2018-09-28 RX ADMIN — DEXTROSE MONOHYDRATE: 50 INJECTION, SOLUTION INTRAVENOUS at 18:55

## 2018-09-28 RX ADMIN — AMIODARONE HYDROCHLORIDE 150 MG: 1.5 INJECTION, SOLUTION INTRAVENOUS at 18:52

## 2018-09-28 RX ADMIN — ENOXAPARIN SODIUM 40 MG: 100 INJECTION SUBCUTANEOUS at 06:09

## 2018-09-28 RX ADMIN — NICOTINE 21 MG: 21 PATCH, EXTENDED RELEASE TRANSDERMAL at 06:10

## 2018-09-28 RX ADMIN — ASPIRIN 81 MG: 81 TABLET, COATED ORAL at 06:10

## 2018-09-28 RX ADMIN — HYDROCODONE BITARTRATE AND ACETAMINOPHEN 1 TABLET: 10; 325 TABLET ORAL at 11:10

## 2018-09-28 RX ADMIN — STANDARDIZED SENNA CONCENTRATE AND DOCUSATE SODIUM 2 TABLET: 8.6; 5 TABLET ORAL at 18:00

## 2018-09-28 RX ADMIN — FUROSEMIDE 20 MG: 10 INJECTION, SOLUTION INTRAMUSCULAR; INTRAVENOUS at 18:30

## 2018-09-28 RX ADMIN — LISINOPRIL 2.5 MG: 5 TABLET ORAL at 06:09

## 2018-09-28 RX ADMIN — FUROSEMIDE 20 MG: 10 INJECTION, SOLUTION INTRAMUSCULAR; INTRAVENOUS at 06:09

## 2018-09-28 RX ADMIN — MAGNESIUM SULFATE HEPTAHYDRATE 2 G: 40 INJECTION, SOLUTION INTRAVENOUS at 08:31

## 2018-09-28 RX ADMIN — METOPROLOL SUCCINATE 50 MG: 25 TABLET, EXTENDED RELEASE ORAL at 06:09

## 2018-09-28 RX ADMIN — STANDARDIZED SENNA CONCENTRATE AND DOCUSATE SODIUM 2 TABLET: 8.6; 5 TABLET ORAL at 06:10

## 2018-09-28 RX ADMIN — AMIODARONE HYDROCHLORIDE 0.99 MG/MIN: 50 INJECTION, SOLUTION INTRAVENOUS at 19:05

## 2018-09-28 ASSESSMENT — ENCOUNTER SYMPTOMS
BACK PAIN: 0
BLOOD IN STOOL: 0
SHORTNESS OF BREATH: 0
LIGHT-HEADEDNESS: 0
PALPITATIONS: 0
COLOR CHANGE: 0
EYE ITCHING: 0
COUGH: 1
NERVOUS/ANXIOUS: 0
FEVER: 0
CHEST TIGHTNESS: 0

## 2018-09-28 ASSESSMENT — PAIN SCALES - GENERAL
PAINLEVEL_OUTOF10: 0
PAINLEVEL_OUTOF10: 5
PAINLEVEL_OUTOF10: 0
PAINLEVEL_OUTOF10: 3

## 2018-09-28 NOTE — PROGRESS NOTES
Monitor Summary:  [id number]  HR-90s-100s  Ectopy-  Measurements-.20/.16/.48       12-Hour chart check  2RN skin assessment

## 2018-09-28 NOTE — THERAPY
"Physical Therapy Evaluation completed.   Bed Mobility:  Supine to Sit: Minimal Assist  Transfers: Sit to Stand: Contact Guard Assist  Gait: Level Of Assist: Contact Guard Assist with Front-Wheel Walker       Plan of Care: Will benefit from Physical Therapy 3 times per week  Discharge Recommendations: Equipment: Will Continue to Assess for Equipment Needs. See below    Pt presents to PT with impaired aerobic capacity, endurance, balance and gait associated with recent deconditoining and medical co-morbidities. He is able to demonstrate bed mobility with min A, sit<>stand and ambulation with FWW with CGA. Noted that pt reports limited mobility/ambulation for ~ 1wk and appears generally deconditioned. His current cognitive impairements v behavior? is also exacerbating his functional impairements as well. He will benefit from continued skilled acute PT while here and in current condition would recommend continued skilled PT/placement prior to medical dc to home given current objective findings, PLOF, enviornmental barriers (including stairs in home), current co-morbidities (including pacemaker placement) and UE restrictions, and social supports (as pt lives alone, though does appear to have assist from family/friends for IADls and transport; at least 3 friends present during eval). He appars at increased risk for falls in current condition. WIll continue to visit.     See \"Rehab Therapy-Acute\" Patient Summary Report for complete documentation.     "

## 2018-09-28 NOTE — CARE PLAN
Problem: Safety  Goal: Will remain free from injury  Outcome: PROGRESSING AS EXPECTED  Pt free from injury-bed in low, locked position; call light & personal belongings within reach; hourly rounding; fall prevention education provided    Problem: Pain Management  Goal: Pain level will decrease to patient's comfort goal  Outcome: PROGRESSING AS EXPECTED  Pain well-controlled on current pain medication regimen (see MAR for details)

## 2018-09-28 NOTE — PROGRESS NOTES
Cardiology Follow Up Progress Note    Date of Service  9/28/2018    Attending Physician  Russel Carrera M.D.    Chief Complaint   Chest pain    HPI  Nisa Ross is a 58 y.o. male admitted 9/24/2018 with an ST segment elevation myocardial infarction was found to have thrombosis of the LAD.  He was also in third-degree AV block and had a severe reduction of ejection fraction.  His chest pain had been going on for days.    Interim Events  Patient underwent cardiac catheterization.  He underwent thrombectomy and PCTA of the LAD.  The LAD was noted to be dissected.  He had  of the RCA and 80% disease in the ramus intermedius.  An intra-aortic balloon pump was placed.  Cardiovascular surgery was consulted.  This morning a discussion was held with Dr. Hightower with myself.  He felt that the patient was too high risk and would have limited benefit from revascularization of the LAD.  Surgery was therefore canceled.      Patient is post AICD placement.  A left ventricular lead was unable to be placed and he did have perforation/dissection of the coronary sinus. Trivial pericardial effusion noted on fu echo 9/27.    He is maintaining a sinus rhythm with normal AV conduction at the present time.    Review of Systems  Review of Systems   Respiratory: Negative for shortness of breath.    Cardiovascular: Negative for chest pain and palpitations.       Vital signs in last 24 hours  Temp:  [35.9 °C (96.6 °F)-38.1 °C (100.6 °F)] 36.4 °C (97.6 °F)  Pulse:  [] 96  Resp:  [13-35] 31    Physical Exam  Physical Exam   Neck: No JVD present.   Cardiovascular: Normal rate and regular rhythm.  Exam reveals no gallop.    No murmur heard.  Pulmonary/Chest: Effort normal. He has no rales.   Abdominal: Soft. There is no tenderness.   Musculoskeletal: He exhibits no edema.       Lab Review  Lab Results   Component Value Date/Time    WBC 12.9 (H) 09/28/2018 04:45 AM    RBC 4.37 (L) 09/28/2018 04:45 AM    HEMOGLOBIN 13.1 (L)  09/28/2018 04:45 AM    HEMATOCRIT 40.6 (L) 09/28/2018 04:45 AM    MCV 92.9 09/28/2018 04:45 AM    MCH 30.0 09/28/2018 04:45 AM    MCHC 32.3 (L) 09/28/2018 04:45 AM    MPV 10.5 09/28/2018 04:45 AM      Lab Results   Component Value Date/Time    SODIUM 133 (L) 09/28/2018 04:45 AM    POTASSIUM 4.1 09/28/2018 04:45 AM    CHLORIDE 98 09/28/2018 04:45 AM    CO2 26 09/28/2018 04:45 AM    GLUCOSE 154 (H) 09/28/2018 04:45 AM    BUN 17 09/28/2018 04:45 AM    CREATININE 0.72 09/28/2018 04:45 AM      Lab Results   Component Value Date/Time    ASTSGOT 85 (H) 09/27/2018 06:00 AM    ALTSGPT 174 (H) 09/27/2018 06:00 AM     Lab Results   Component Value Date/Time    CHOLSTRLTOT 128 09/25/2018 01:28 AM    LDL 85 09/25/2018 01:28 AM    HDL 24 (A) 09/25/2018 01:28 AM    TRIGLYCERIDE 94 09/25/2018 01:28 AM    TROPONINI 43.85 (H) 09/25/2018 01:28 AM             Cardiac Imaging and Procedures Review  EKG:  My personal interpretation of the EKG dated 9/25 is ventricular paced with complete AV block and no ventricular escape and the pacemaker is turned off.    Echocardiogram:   CONCLUSIONS  No prior study is available for comparison.   Severely reduced left ventricular systolic function. Left ventricular   ejection fraction is visually estimated to be 15%.  Only preserved wall motion at the basal anterior wall segment.  No evidence of valvular abnormality based on Doppler evaluation.        KIRK FRITZ  Exam Date:         09/24/2018     Cardiac Catheterization:    ANGIOGRAM:  LEFT MAIN CORONARY ARTERY:  Left main coronary artery is a moderate length   large caliber vessel free of disease.  LEFT ANTERIOR DESCENDING ARTERY:  Left anterior descending artery is a long,   mild-to-moderate caliber vessel which wraps around the apex.  It is occluded   at the ostium.  Proximal to mid portion of the vessel with a long area of   dissection.  There is an early originating large diagonal branch with proximal   concentric 90% stenosis.  RAMUS  INTERMEDIUS:  The ramus intermedius is a very large caliber vessel with   ostial concentric 80% stenosis.  LEFT CIRCUMFLEX ARTERY:  Left circumflex artery is a nondominant small caliber   vessel with small obtuse marginal branches noted free of disease.  RIGHT CORONARY ARTERY:  Right coronary artery is a dominant vessel which is   occluded at the ostial portion.  Distally posterior descending artery and   posterior lateral branches fills via left to right collaterals.     PERCUTANEOUS INTERVENTION:   1. Thrombectomy with Priority One device.    PTCA with 2.5x20 mm Emerge balloon.    2. Intraaortic balloon pump placemen.  3. Temporary pacemaker balloon.     RECOMMENDATIONS:  Recommend medical stabilization and consideration for   coronary artery bypass graft surgery.        Assessment/Plan  * STEMI (ST elevation myocardial infarction) ([facility])   Assessment & Plan    Patient is maintaining an adequate blood pressure in spite of his severe ischemic cardiomyopathy.  We will continue increasing his medical therapy has tolerated.  He appears stable for telemetry at this time.        Complete heart block by electrocardiogram ([facility])   Assessment & Plan    Patient is post AICD placement.  Again, a left ventricular lead was unable to be placed.  No significant pericardial effusion noted on echocardiography.  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  He has had return of AV conduction and has not needed a significant amount of pacing.        Ischemic cardiomyopathy- (present on admission)   Assessment & Plan    Again, we will increase his beta-blocker and ACE inhibitor therapy today.  Prior to discharge, will need to decide whether or not to proceed to intervention on the large diagonal and large ramus intermedius arteries which both have significant proximal lesions.            Thank you for allowing me to participate in the care of this patient.  I will continue to follow this patient    Please contact  me with any questions.    Russel Carrera M.D.   Cardiologist, [facility]  [phone]

## 2018-09-28 NOTE — PROGRESS NOTES
Critical Care Progress Note    Date of admission  9/24/2018    Chief Complaint  58 y.o. male admitted 9/24/2018 with CP and SOB    Hospital Course    58 y.o. male with a past medical history of type 2 diabetes mellitus, coronary artery disease, alcohol abuse, hypertension, active cigarette smoking, presented to [facility] complaining of chest pain and shortness of breath over the last several days.  He was found to have a third-degree AV block and anterior ST elevation myocardial infarction on EKG with elevation of troponin was greater than 20.  He underwent cardiac catheterization that revealed complete thrombosis of LAD and ejection fraction 20%, and was deferred for cardiothoracic surgery evaluation for CABG.  Patient transferred to ICU with intra-aortic balloon pump in place, therefore ICU consult.  On further review of symptoms he admits to nausea and vomiting.        Interval Problem Update  Reviewed last 24 hour events:   - coronary sinus dissected with AICD placement, echo without significant effusion   - increased WBC, Tm 38.1   - plts up to 160   - diuresing well   - low Na 133 from 135   - low Mag   - CVS recommending medical management for now   - resumed on home lantus    Review of Systems  Review of Systems   Constitutional: Negative for fever.   HENT: Negative for hearing loss.    Eyes: Negative for itching.   Respiratory: Positive for cough. Negative for chest tightness and shortness of breath.    Cardiovascular: Positive for leg swelling. Negative for chest pain.   Gastrointestinal: Negative for blood in stool.   Genitourinary: Negative for dysuria.   Musculoskeletal: Negative for back pain.   Skin: Negative for color change.   Allergic/Immunologic: Negative for immunocompromised state.   Neurological: Negative for light-headedness.   Psychiatric/Behavioral: The patient is not nervous/anxious.    All other systems reviewed and are negative.       Vital Signs for last 24 hours   Temp:  [35.9 °C  (96.6 °F)-38.1 °C (100.6 °F)] 36.4 °C (97.6 °F)  Pulse:  [] 96  Resp:  [13-41] 31    Hemodynamic parameters for last 24 hours       Vent Settings for last 24 hours   RA, IS 1000mL    Physical Exam   Physical Exam   Constitutional: He is oriented to person, place, and time. He appears well-developed and well-nourished. No distress.   Up in chair this morning, tolerating diet   HENT:   Head: Normocephalic and atraumatic.   Right Ear: External ear normal.   Left Ear: External ear normal.   Nose: Nose normal.   Eyes: Pupils are equal, round, and reactive to light. Conjunctivae and EOM are normal.   Neck: Neck supple. No thyromegaly present.   Cardiovascular: Normal rate, regular rhythm and intact distal pulses.   No extrasystoles are present. PMI is displaced.  Exam reveals no distant heart sounds, no friction rub and no decreased pulses.    No murmur heard.  Newly placed AICD palpable chest wall with incision that is clean/dry/intact   Pulmonary/Chest: No accessory muscle usage. No respiratory distress. He has no decreased breath sounds. He has no wheezes. He has no rhonchi. He has rales in the right lower field and the left lower field.   Abdominal: Soft. Bowel sounds are normal. He exhibits no distension. There is no tenderness. There is no rebound.   Musculoskeletal: He exhibits no edema, tenderness or deformity.   Neurological: He is alert and oriented to person, place, and time. No cranial nerve deficit. Coordination normal.   Skin: Skin is warm and dry. No rash noted. He is not diaphoretic.   Psychiatric: He has a normal mood and affect. His behavior is normal. Judgment and thought content normal.   Nursing note and vitals reviewed.      Medications  Current Facility-Administered Medications   Medication Dose Route Frequency Provider Last Rate Last Dose   • magnesium sulfate IVPB premix 2 g  2 g Intravenous Once Jeremy M Gonda, M.D.       • HYDROcodone/acetaminophen (NORCO)  MG per tablet 1 Tab  1 Tab  Oral Q6HRS PRN AUSTIN Carr   1 Tab at 09/27/18 1922   • metoprolol SR (TOPROL XL) tablet 50 mg  50 mg Oral Q DAY Russel Carrera M.D.   50 mg at 09/28/18 0609   • lisinopril (PRINIVIL) tablet 2.5 mg  2.5 mg Oral Q DAY Russel Carrera M.D.   2.5 mg at 09/28/18 0609   • furosemide (LASIX) injection 20 mg  20 mg Intravenous Q12HRS Jeremy M Gonda, M.D.   20 mg at 09/28/18 0609   • insulin regular (HUMULIN R) injection 2-9 Units  2-9 Units Subcutaneous 4X/DAY ACHS Jeremy M Gonda, M.D.   5 Units at 09/27/18 1956    And   • glucose 4 g chewable tablet 16 g  16 g Oral Q15 MIN PRN Jeremy M Gonda, M.D.        And   • dextrose 50% (D50W) injection 25 mL  25 mL Intravenous Q15 MIN PRN Jeremy M Gonda, M.D.       • folic acid (FOLVITE) tablet 1 mg  1 mg Oral DAILY Jeremy M Gonda, M.D.   1 mg at 09/28/18 0609   • chlorproMAZINE (THORAZINE) tablet 25 mg  25 mg Oral 4X/DAY PRN Jeremy M Gonda, M.D.       • insulin glargine (LANTUS) injection 30 Units  30 Units Subcutaneous Q EVENING Jeremy M Gonda, M.D.   30 Units at 09/27/18 1756   • MD Alert...ICU Electrolyte Replacement per Pharmacy   Other pharmacy to dose Jeremy M Gonda, M.D.       • enoxaparin (LOVENOX) inj 40 mg  40 mg Subcutaneous DAILY Jeremy M Gonda, M.D.   40 mg at 09/28/18 0609   • LORazepam (ATIVAN) injection 1-2 mg  1-2 mg Intravenous Q2HRS PRN Aditya Bower M.D.   1 mg at 09/25/18 0202   • hyoscyamine-maalox plus-lidocaine viscous (GI COCKTAIL) oral susp 15 mL  15 mL Oral Q6HRS PRN Russel Carrera M.D.       • aspirin EC (ECOTRIN) tablet 81 mg  81 mg Oral DAILY Dimas Henderson M.D.   81 mg at 09/28/18 0610   • Respiratory Care per Protocol   Nebulization Continuous RT Galina Taveras, A.P.N.       • ALPRAZolam (XANAX) tablet 0.25 mg  0.25 mg Oral Q6HRS PRN Galina Taveras, A.P.N.   0.25 mg at 09/26/18 2336   • nitroglycerin (NITROSTAT) tablet 0.4 mg  0.4 mg Sublingual Q5 MIN PRN Manuel Gonzalez M.D.   Stopped at 09/26/18 0916   • morphine (pf)  4 mg/ml injection 2-4 mg  2-4 mg Intravenous Q5 MIN PRROLY Gonzalez M.D.   Stopped at 18 0914   • senna-docusate (PERICOLACE or SENOKOT S) 8.6-50 MG per tablet 2 Tab  2 Tab Oral BID Manuel Gonzalez M.D.   2 Tab at 18 0610    And   • polyethylene glycol/lytes (MIRALAX) PACKET 1 Packet  1 Packet Oral QDAY PRROLY Gonzalez M.D.        And   • magnesium hydroxide (MILK OF MAGNESIA) suspension 30 mL  30 mL Oral QDAY MARTHA Gonzalez M.D.        And   • bisacodyl (DULCOLAX) suppository 10 mg  10 mg Rectal QDAY MARTHA Gonzalez M.D.       • nicotine (NICODERM) 21 MG/24HR 21 mg  21 mg Transdermal Daily-0600 Manuel Gonzalez M.D.   21 mg at 18 0610    And   • nicotine polacrilex (NICORETTE) 2 MG piece 2 mg  2 mg Oral Q HOUR PRN Manuel Gonzalez M.D.       • promethazine (PHENERGAN) tablet 25 mg  25 mg Oral Q6HRS MARTHA Gonzalez M.D.   25 mg at 18 2238   • promethazine (PHENERGAN) suppository 25 mg  25 mg Rectal Q6HRS MARTHA Gonzalez M.D.           Fluids    Intake/Output Summary (Last 24 hours) at 18 0819  Last data filed at 18 0800   Gross per 24 hour   Intake           873.33 ml   Output             3510 ml   Net         -2636.67 ml       Laboratory  Recent Results (from the past 48 hour(s))   EKG    Collection Time: 18  9:05 AM   Result Value Ref Range    Report       [facility]    Test Date:  2018  Pt Name:    KIRK FRITZ               Department: 161  MRN:        [id number]                      Room:       [facility]  Gender:     Male                         Technician: [facility]  :        1960                   Requested By:JEREMY M GONDA  Order #:    [id number]                    Reading MD: Jimbo Farfan MD    Measurements  Intervals                                Axis  Rate:       80                           P:          53  KY:         164                          QRS:        -72  QRSD:       162                           T:          88  QT:         432  QTc:        499    Interpretive Statements  VENTRICULAR-PACED RHYTHM  SINUS TACHYCARDIA  COMPLETE HEART BLOCK  Compared to ECG 2018 03:52:14  Sinus rhythm no longer present  PACED RHYTHM NOW PRESENT    Electronically Signed On 2018 18:17:25 PDT by Jimbo Farfan MD     EKG    Collection Time: 18  9:15 AM   Result Value Ref Range    Report       [facility]    Test Date:  2018  Pt Name:    KIRK FRITZ               Department: 161  MRN:        [id number]                      Room:       T610  Gender:     Male                         Technician: dominic  :        1960                   Requested By:FRANCISCO C GONDA  Order #:    [id number]                    Reading MD: Jimbo Farfan MD    Measurements  Intervals                                Axis  Rate:       69                           P:          54  DC:         336                          QRS:        -74  QRSD:       158                          T:          89  QT:         432  QTc:        463    Interpretive Statements  VENTRICULAR-PACED RHYTHM  SINUS TACHYCARDIA  COMPLETE HEART BLOCK  CONSIDER PACEMAKER TESTING VS MALFUNCTION.  Compared to ECG 2018 09:05:09  THERE IS A LAPSE IN THE VENTRICULAR PACING    Electronically Signed On 2018 18:18:34 PDT by Jimbo Farfan MD     ACCU-CHEK GLUCOSE    Collection Time: 18 10:49 AM   Result Value Ref Range    Glucose - Accu-Ck 139 (H) 65 - 99 mg/dL   ACCU-CHEK GLUCOSE    Collection Time: 18 11:54 AM   Result Value Ref Range    Glucose - Accu-Ck 137 (H) 65 - 99 mg/dL   EC-ECHOCARDIOGRAM LTD W/O CONT    Collection Time: 18  3:15 PM   Result Value Ref Range    Left Ventrical Ejection Fraction 25    ACCU-CHEK GLUCOSE    Collection Time: 18  4:18 PM   Result Value Ref Range    Glucose - Accu-Ck 129 (H) 65 - 99 mg/dL   ACCU-CHEK GLUCOSE    Collection Time: 18  5:43 PM   Result Value Ref Range    Glucose - Accu-Ck 132  (H) 65 - 99 mg/dL   ACCU-CHEK GLUCOSE    Collection Time: 09/26/18 11:36 PM   Result Value Ref Range    Glucose - Accu-Ck 204 (H) 65 - 99 mg/dL   ACCU-CHEK GLUCOSE    Collection Time: 09/27/18  5:56 AM   Result Value Ref Range    Glucose - Accu-Ck 199 (H) 65 - 99 mg/dL   CBC WITH DIFFERENTIAL    Collection Time: 09/27/18  6:00 AM   Result Value Ref Range    WBC 11.1 (H) 4.8 - 10.8 K/uL    RBC 4.00 (L) 4.70 - 6.10 M/uL    Hemoglobin 11.9 (L) 14.0 - 18.0 g/dL    Hematocrit 36.7 (L) 42.0 - 52.0 %    MCV 91.8 81.4 - 97.8 fL    MCH 29.8 27.0 - 33.0 pg    MCHC 32.4 (L) 33.7 - 35.3 g/dL    RDW 48.2 35.9 - 50.0 fL    Platelet Count 154 (L) 164 - 446 K/uL    MPV 10.6 9.0 - 12.9 fL    Neutrophils-Polys 74.40 (H) 44.00 - 72.00 %    Lymphocytes 14.30 (L) 22.00 - 41.00 %    Monocytes 10.00 0.00 - 13.40 %    Eosinophils 0.30 0.00 - 6.90 %    Basophils 0.50 0.00 - 1.80 %    Immature Granulocytes 0.50 0.00 - 0.90 %    Nucleated RBC 0.00 /100 WBC    Neutrophils (Absolute) 8.22 (H) 1.82 - 7.42 K/uL    Lymphs (Absolute) 1.58 1.00 - 4.80 K/uL    Monos (Absolute) 1.11 (H) 0.00 - 0.85 K/uL    Eos (Absolute) 0.03 0.00 - 0.51 K/uL    Baso (Absolute) 0.06 0.00 - 0.12 K/uL    Immature Granulocytes (abs) 0.05 0.00 - 0.11 K/uL    NRBC (Absolute) 0.00 K/uL   BASIC METABOLIC PANEL    Collection Time: 09/27/18  6:00 AM   Result Value Ref Range    Sodium 135 135 - 145 mmol/L    Potassium 3.9 3.6 - 5.5 mmol/L    Chloride 101 96 - 112 mmol/L    Co2 21 20 - 33 mmol/L    Glucose 227 (H) 65 - 99 mg/dL    Bun 22 8 - 22 mg/dL    Creatinine 0.75 0.50 - 1.40 mg/dL    Calcium 8.3 (L) 8.5 - 10.5 mg/dL    Anion Gap 13.0 (H) 0.0 - 11.9   MAGNESIUM    Collection Time: 09/27/18  6:00 AM   Result Value Ref Range    Magnesium 2.1 1.5 - 2.5 mg/dL   PHOSPHORUS    Collection Time: 09/27/18  6:00 AM   Result Value Ref Range    Phosphorus 3.2 2.5 - 4.5 mg/dL   ESTIMATED GFR    Collection Time: 09/27/18  6:00 AM   Result Value Ref Range    GFR If  >60  >60 mL/min/1.73 m 2    GFR If Non African American >60 >60 mL/min/1.73 m 2   COMP METABOLIC PANEL    Collection Time: 09/27/18  6:00 AM   Result Value Ref Range    Sodium 135 135 - 145 mmol/L    Potassium 3.9 3.6 - 5.5 mmol/L    Chloride 101 96 - 112 mmol/L    Co2 21 20 - 33 mmol/L    Anion Gap 13.0 (H) 0.0 - 11.9    Glucose 227 (H) 65 - 99 mg/dL    Bun 22 8 - 22 mg/dL    Creatinine 0.75 0.50 - 1.40 mg/dL    Calcium 8.3 (L) 8.5 - 10.5 mg/dL    AST(SGOT) 85 (H) 12 - 45 U/L    ALT(SGPT) 174 (H) 2 - 50 U/L    Alkaline Phosphatase 59 30 - 99 U/L    Total Bilirubin 0.9 0.1 - 1.5 mg/dL    Albumin 2.7 (L) 3.2 - 4.9 g/dL    Total Protein 5.6 (L) 6.0 - 8.2 g/dL    Globulin 2.9 1.9 - 3.5 g/dL    A-G Ratio 0.9 g/dL   EC-ECHOCARDIOGRAM LTD W/O CONT    Collection Time: 09/27/18 11:24 AM   Result Value Ref Range    Eject.Frac. MOD BP 46.01     Eject.Frac. MOD 4C 46.55     Eject.Frac. MOD 2C 47.86     Left Ventrical Ejection Fraction 25    ACCU-CHEK GLUCOSE    Collection Time: 09/27/18 12:10 PM   Result Value Ref Range    Glucose - Accu-Ck 266 (H) 65 - 99 mg/dL   ACCU-CHEK GLUCOSE    Collection Time: 09/27/18  5:51 PM   Result Value Ref Range    Glucose - Accu-Ck 260 (H) 65 - 99 mg/dL   ACCU-CHEK GLUCOSE    Collection Time: 09/27/18  7:55 PM   Result Value Ref Range    Glucose - Accu-Ck 281 (H) 65 - 99 mg/dL   CBC WITH DIFFERENTIAL    Collection Time: 09/28/18  4:45 AM   Result Value Ref Range    WBC 12.9 (H) 4.8 - 10.8 K/uL    RBC 4.37 (L) 4.70 - 6.10 M/uL    Hemoglobin 13.1 (L) 14.0 - 18.0 g/dL    Hematocrit 40.6 (L) 42.0 - 52.0 %    MCV 92.9 81.4 - 97.8 fL    MCH 30.0 27.0 - 33.0 pg    MCHC 32.3 (L) 33.7 - 35.3 g/dL    RDW 48.5 35.9 - 50.0 fL    Platelet Count 160 (L) 164 - 446 K/uL    MPV 10.5 9.0 - 12.9 fL    Neutrophils-Polys 71.30 44.00 - 72.00 %    Lymphocytes 12.30 (L) 22.00 - 41.00 %    Monocytes 14.20 (H) 0.00 - 13.40 %    Eosinophils 1.20 0.00 - 6.90 %    Basophils 0.50 0.00 - 1.80 %    Immature Granulocytes 0.50 0.00  - 0.90 %    Nucleated RBC 0.00 /100 WBC    Neutrophils (Absolute) 9.21 (H) 1.82 - 7.42 K/uL    Lymphs (Absolute) 1.58 1.00 - 4.80 K/uL    Monos (Absolute) 1.83 (H) 0.00 - 0.85 K/uL    Eos (Absolute) 0.15 0.00 - 0.51 K/uL    Baso (Absolute) 0.06 0.00 - 0.12 K/uL    Immature Granulocytes (abs) 0.06 0.00 - 0.11 K/uL    NRBC (Absolute) 0.00 K/uL   BASIC METABOLIC PANEL    Collection Time: 09/28/18  4:45 AM   Result Value Ref Range    Sodium 133 (L) 135 - 145 mmol/L    Potassium 4.1 3.6 - 5.5 mmol/L    Chloride 98 96 - 112 mmol/L    Co2 26 20 - 33 mmol/L    Glucose 154 (H) 65 - 99 mg/dL    Bun 17 8 - 22 mg/dL    Creatinine 0.72 0.50 - 1.40 mg/dL    Calcium 8.2 (L) 8.5 - 10.5 mg/dL    Anion Gap 9.0 0.0 - 11.9   MAGNESIUM    Collection Time: 09/28/18  4:45 AM   Result Value Ref Range    Magnesium 1.9 1.5 - 2.5 mg/dL   PHOSPHORUS    Collection Time: 09/28/18  4:45 AM   Result Value Ref Range    Phosphorus 3.1 2.5 - 4.5 mg/dL   ESTIMATED GFR    Collection Time: 09/28/18  4:45 AM   Result Value Ref Range    GFR If African American >60 >60 mL/min/1.73 m 2    GFR If Non African American >60 >60 mL/min/1.73 m 2       Imaging  X-Ray:  No film today.  Unchanged 9/28    Assessment/Plan  * STEMI (ST elevation myocardial infarction) (HCC)   Assessment & Plan    S/P angioplasty 9/24  Aspirin/statin  Beta-blocker, ACE inhibitor        Cardiogenic shock ([facility])   Assessment & Plan    Resolved         Diabetic ketoacidosis associated with type 2 diabetes mellitus (HCC)- (present on admission)   Assessment & Plan    Insulin sliding scale, resume home Lantus dose  Diabetic diet        Complete heart block by electrocardiogram ([facility])   Assessment & Plan    S/P AICD/pacer placed on 9/26  Optimize electrolytes        Acute-on-chronic kidney injury (HCC)- (present on admission)   Assessment & Plan    likely ATN -improving  Avoid nephrotoxins  Daily BMP and monitor urine output  Continue diuresis        Hyponatremia- (present on admission)    Assessment & Plan    Recurrent, monitor with diuresis        Leukocytosis- (present on admission)   Assessment & Plan    Likely reactive, doubt infection, improving again today  Monitor off antibiotics currently         Elevated LFTs- (present on admission)   Assessment & Plan    Avoid hepatotoxins  Monitor intermittently          Hypomagnesemia   Assessment & Plan    Repletion and monitor daily        Hypokalemia   Assessment & Plan    Monitor daily and replace        Thrombocytopenia (HCC)   Assessment & Plan    Improving  Daily CBC        Ischemic cardiomyopathy- (present on admission)   Assessment & Plan    Beta-blocker, ACE inhibitor, diuretic        HTN (hypertension), malignant   Assessment & Plan    Beta-blocker, ACE inhibitor  Goal systolic blood pressure less than 140        Smoking   Assessment & Plan    Tobacco cessation education             VTE:  Lovenox  Ulcer: Not Indicated  Lines: None    I have performed a physical exam and reviewed and updated ROS and Plan today (9/28/2018). In review of yesterday's note (9/27/2018), there are no changes except as documented above.      Awaiting transfer of patient out of ICU today. [facility] will sign off at transfer. Please call with questions.    Discussed patient condition and risk of morbidity and/or mortality with RN, RT, Pharmacy, , Charge nurse / hot rounds, Patient and cardiology

## 2018-09-28 NOTE — PROGRESS NOTES
Report given to Tele 8 RN. All patient's belongings with patient along with chart and meds. Patient placed on T8 tele box on arrival with T8 RN at bedside. All questions answered.

## 2018-09-28 NOTE — PROGRESS NOTES
1145: Pt transferred from [facility] to tele.  Pt AOx4, VSS--pt denies any pain or needs at this time.  States he is comfortable currently.  Bed locked in lowest position.  Call light and personal items within reach.    1730:  Called into pts room by monitor tech for tachycardia--140s.  Pt denies any change is chest pain--still c/o of mild pressure by AICD site--denies any changes.  Denies any SOB. VSS.  Paged on call cards and STAT EKG ordered.    1735: EKG tech at bedside, HR remains 120-130s.  RR called.    1732: updated by tele room--1716 converted to afib-- Althea paged to update by RR RN.    1750: Telephone orders by Althea for digoxin --see eMAR..    1820: HR remains in 120s.      1825: Dr Oh at bedside--transfer to ICU.  See eMAR. RR RNs remain at bedside.

## 2018-09-28 NOTE — CARE PLAN
Problem: Safety  Goal: Will remain free from injury    Intervention: Provide assistance with mobility  Patient educated on need for assistance and verbalizes understanding      Problem: Pain Management  Goal: Pain level will decrease to patient's comfort goal    Intervention: Follow pain managment plan developed in collaboration with patient and Interdisciplinary Team  Patient's pain assessed regularly with appropriate interventions

## 2018-09-29 ENCOUNTER — APPOINTMENT (OUTPATIENT)
Dept: RADIOLOGY | Facility: MEDICAL CENTER | Age: 58
DRG: 222 | End: 2018-09-29
Attending: INTERNAL MEDICINE
Payer: MEDICARE

## 2018-09-29 PROBLEM — I25.10 CAD (CORONARY ARTERY DISEASE): Status: ACTIVE | Noted: 2018-09-29

## 2018-09-29 PROBLEM — I48.91 ATRIAL FIBRILLATION WITH RAPID VENTRICULAR RESPONSE (HCC): Status: ACTIVE | Noted: 2018-09-29

## 2018-09-29 LAB
ALBUMIN SERPL BCP-MCNC: 2.8 G/DL (ref 3.2–4.9)
ALBUMIN/GLOB SERPL: 1 G/DL
ALP SERPL-CCNC: 76 U/L (ref 30–99)
ALT SERPL-CCNC: 77 U/L (ref 2–50)
ANION GAP SERPL CALC-SCNC: 9 MMOL/L (ref 0–11.9)
APTT PPP: 32.2 SEC (ref 24.7–36)
APTT PPP: 49.9 SEC (ref 24.7–36)
AST SERPL-CCNC: 32 U/L (ref 12–45)
BASOPHILS # BLD AUTO: 0.5 % (ref 0–1.8)
BASOPHILS # BLD: 0.06 K/UL (ref 0–0.12)
BILIRUB SERPL-MCNC: 1.2 MG/DL (ref 0.1–1.5)
BUN SERPL-MCNC: 13 MG/DL (ref 8–22)
CALCIUM SERPL-MCNC: 7.7 MG/DL (ref 8.5–10.5)
CHLORIDE SERPL-SCNC: 90 MMOL/L (ref 96–112)
CO2 SERPL-SCNC: 27 MMOL/L (ref 20–33)
CREAT SERPL-MCNC: 0.61 MG/DL (ref 0.5–1.4)
EKG IMPRESSION: NORMAL
EOSINOPHIL # BLD AUTO: 0.33 K/UL (ref 0–0.51)
EOSINOPHIL NFR BLD: 2.7 % (ref 0–6.9)
ERYTHROCYTE [DISTWIDTH] IN BLOOD BY AUTOMATED COUNT: 45.7 FL (ref 35.9–50)
GLOBULIN SER CALC-MCNC: 2.8 G/DL (ref 1.9–3.5)
GLUCOSE BLD-MCNC: 220 MG/DL (ref 65–99)
GLUCOSE BLD-MCNC: 223 MG/DL (ref 65–99)
GLUCOSE BLD-MCNC: 93 MG/DL (ref 65–99)
GLUCOSE BLD-MCNC: 98 MG/DL (ref 65–99)
GLUCOSE SERPL-MCNC: 362 MG/DL (ref 65–99)
HCT VFR BLD AUTO: 36.1 % (ref 42–52)
HGB BLD-MCNC: 12 G/DL (ref 14–18)
IMM GRANULOCYTES # BLD AUTO: 0.06 K/UL (ref 0–0.11)
IMM GRANULOCYTES NFR BLD AUTO: 0.5 % (ref 0–0.9)
INR PPP: 1.13 (ref 0.87–1.13)
LYMPHOCYTES # BLD AUTO: 2.24 K/UL (ref 1–4.8)
LYMPHOCYTES NFR BLD: 18.5 % (ref 22–41)
MAGNESIUM SERPL-MCNC: 1.9 MG/DL (ref 1.5–2.5)
MCH RBC QN AUTO: 30.1 PG (ref 27–33)
MCHC RBC AUTO-ENTMCNC: 33.2 G/DL (ref 33.7–35.3)
MCV RBC AUTO: 90.5 FL (ref 81.4–97.8)
MONOCYTES # BLD AUTO: 1.72 K/UL (ref 0–0.85)
MONOCYTES NFR BLD AUTO: 14.2 % (ref 0–13.4)
NEUTROPHILS # BLD AUTO: 7.67 K/UL (ref 1.82–7.42)
NEUTROPHILS NFR BLD: 63.6 % (ref 44–72)
NRBC # BLD AUTO: 0 K/UL
NRBC BLD-RTO: 0 /100 WBC
PHOSPHATE SERPL-MCNC: 2.8 MG/DL (ref 2.5–4.5)
PLATELET # BLD AUTO: 188 K/UL (ref 164–446)
PMV BLD AUTO: 10.5 FL (ref 9–12.9)
POTASSIUM SERPL-SCNC: 3.2 MMOL/L (ref 3.6–5.5)
PROT SERPL-MCNC: 5.6 G/DL (ref 6–8.2)
PROTHROMBIN TIME: 14.6 SEC (ref 12–14.6)
RBC # BLD AUTO: 3.99 M/UL (ref 4.7–6.1)
SODIUM SERPL-SCNC: 126 MMOL/L (ref 135–145)
WBC # BLD AUTO: 12.1 K/UL (ref 4.8–10.8)

## 2018-09-29 PROCEDURE — A9270 NON-COVERED ITEM OR SERVICE: HCPCS | Performed by: INTERNAL MEDICINE

## 2018-09-29 PROCEDURE — 93005 ELECTROCARDIOGRAM TRACING: CPT | Performed by: INTERNAL MEDICINE

## 2018-09-29 PROCEDURE — 71045 X-RAY EXAM CHEST 1 VIEW: CPT

## 2018-09-29 PROCEDURE — 80053 COMPREHEN METABOLIC PANEL: CPT

## 2018-09-29 PROCEDURE — 99233 SBSQ HOSP IP/OBS HIGH 50: CPT | Performed by: INTERNAL MEDICINE

## 2018-09-29 PROCEDURE — 700102 HCHG RX REV CODE 250 W/ 637 OVERRIDE(OP): Performed by: INTERNAL MEDICINE

## 2018-09-29 PROCEDURE — 85730 THROMBOPLASTIN TIME PARTIAL: CPT | Mod: 91

## 2018-09-29 PROCEDURE — A9270 NON-COVERED ITEM OR SERVICE: HCPCS | Performed by: NURSE PRACTITIONER

## 2018-09-29 PROCEDURE — 93010 ELECTROCARDIOGRAM REPORT: CPT | Performed by: INTERNAL MEDICINE

## 2018-09-29 PROCEDURE — 770020 HCHG ROOM/CARE - TELE (206)

## 2018-09-29 PROCEDURE — 82962 GLUCOSE BLOOD TEST: CPT | Mod: 91

## 2018-09-29 PROCEDURE — 700102 HCHG RX REV CODE 250 W/ 637 OVERRIDE(OP): Performed by: NURSE PRACTITIONER

## 2018-09-29 PROCEDURE — 700111 HCHG RX REV CODE 636 W/ 250 OVERRIDE (IP): Performed by: INTERNAL MEDICINE

## 2018-09-29 PROCEDURE — 85025 COMPLETE CBC W/AUTO DIFF WBC: CPT

## 2018-09-29 PROCEDURE — 700102 HCHG RX REV CODE 250 W/ 637 OVERRIDE(OP): Performed by: FAMILY MEDICINE

## 2018-09-29 PROCEDURE — 85610 PROTHROMBIN TIME: CPT

## 2018-09-29 PROCEDURE — A9270 NON-COVERED ITEM OR SERVICE: HCPCS | Performed by: FAMILY MEDICINE

## 2018-09-29 PROCEDURE — 84100 ASSAY OF PHOSPHORUS: CPT

## 2018-09-29 PROCEDURE — 83735 ASSAY OF MAGNESIUM: CPT

## 2018-09-29 PROCEDURE — 700105 HCHG RX REV CODE 258: Performed by: INTERNAL MEDICINE

## 2018-09-29 PROCEDURE — 99291 CRITICAL CARE FIRST HOUR: CPT | Performed by: INTERNAL MEDICINE

## 2018-09-29 RX ORDER — HEPARIN SODIUM 1000 [USP'U]/ML
3200 INJECTION, SOLUTION INTRAVENOUS; SUBCUTANEOUS PRN
Status: DISCONTINUED | OUTPATIENT
Start: 2018-09-29 | End: 2018-10-02

## 2018-09-29 RX ORDER — WARFARIN SODIUM 5 MG/1
5 TABLET ORAL
Status: COMPLETED | OUTPATIENT
Start: 2018-09-29 | End: 2018-09-29

## 2018-09-29 RX ORDER — AMIODARONE HYDROCHLORIDE 200 MG/1
400 TABLET ORAL TWICE DAILY
Status: DISCONTINUED | OUTPATIENT
Start: 2018-09-29 | End: 2018-10-01

## 2018-09-29 RX ORDER — DEXTROSE MONOHYDRATE 25 G/50ML
25 INJECTION, SOLUTION INTRAVENOUS
Status: DISCONTINUED | OUTPATIENT
Start: 2018-09-29 | End: 2018-10-05 | Stop reason: HOSPADM

## 2018-09-29 RX ORDER — POTASSIUM CHLORIDE 20 MEQ/1
40 TABLET, EXTENDED RELEASE ORAL 2 TIMES DAILY
Status: COMPLETED | OUTPATIENT
Start: 2018-09-29 | End: 2018-09-29

## 2018-09-29 RX ORDER — FUROSEMIDE 20 MG/1
20 TABLET ORAL
Status: DISCONTINUED | OUTPATIENT
Start: 2018-09-29 | End: 2018-10-02

## 2018-09-29 RX ORDER — WARFARIN SODIUM 5 MG/1
5 TABLET ORAL
Status: DISCONTINUED | OUTPATIENT
Start: 2018-09-29 | End: 2018-09-29

## 2018-09-29 RX ORDER — MAGNESIUM SULFATE HEPTAHYDRATE 40 MG/ML
2 INJECTION, SOLUTION INTRAVENOUS ONCE
Status: COMPLETED | OUTPATIENT
Start: 2018-09-29 | End: 2018-09-29

## 2018-09-29 RX ADMIN — STANDARDIZED SENNA CONCENTRATE AND DOCUSATE SODIUM 2 TABLET: 8.6; 5 TABLET ORAL at 05:03

## 2018-09-29 RX ADMIN — LORAZEPAM 2 MG: 2 INJECTION INTRAMUSCULAR; INTRAVENOUS at 23:01

## 2018-09-29 RX ADMIN — INSULIN HUMAN 4 UNITS: 100 INJECTION, SOLUTION PARENTERAL at 13:03

## 2018-09-29 RX ADMIN — LISINOPRIL 2.5 MG: 5 TABLET ORAL at 05:04

## 2018-09-29 RX ADMIN — AMIODARONE HYDROCHLORIDE 0.5 MG/MIN: 50 INJECTION, SOLUTION INTRAVENOUS at 05:14

## 2018-09-29 RX ADMIN — HEPARIN SODIUM 1200 UNITS: 5000 INJECTION, SOLUTION INTRAVENOUS at 15:02

## 2018-09-29 RX ADMIN — STANDARDIZED SENNA CONCENTRATE AND DOCUSATE SODIUM 2 TABLET: 8.6; 5 TABLET ORAL at 16:51

## 2018-09-29 RX ADMIN — AMIODARONE HYDROCHLORIDE 400 MG: 200 TABLET ORAL at 13:01

## 2018-09-29 RX ADMIN — FUROSEMIDE 20 MG: 10 INJECTION, SOLUTION INTRAMUSCULAR; INTRAVENOUS at 05:04

## 2018-09-29 RX ADMIN — ENOXAPARIN SODIUM 40 MG: 100 INJECTION SUBCUTANEOUS at 05:04

## 2018-09-29 RX ADMIN — WARFARIN SODIUM 5 MG: 5 TABLET ORAL at 18:32

## 2018-09-29 RX ADMIN — FOLIC ACID 1 MG: 1 TABLET ORAL at 05:03

## 2018-09-29 RX ADMIN — ASPIRIN 81 MG: 81 TABLET, COATED ORAL at 05:03

## 2018-09-29 RX ADMIN — LIDOCAINE HYDROCHLORIDE 15 ML: 20 SOLUTION OROPHARYNGEAL at 18:31

## 2018-09-29 RX ADMIN — NICOTINE 21 MG: 21 PATCH, EXTENDED RELEASE TRANSDERMAL at 05:06

## 2018-09-29 RX ADMIN — HEPARIN SODIUM 3200 UNITS: 1000 INJECTION, SOLUTION INTRAVENOUS; SUBCUTANEOUS at 22:44

## 2018-09-29 RX ADMIN — FUROSEMIDE 20 MG: 20 TABLET ORAL at 16:51

## 2018-09-29 RX ADMIN — POTASSIUM CHLORIDE 40 MEQ: 1500 TABLET, EXTENDED RELEASE ORAL at 16:51

## 2018-09-29 RX ADMIN — INSULIN GLARGINE 30 UNITS: 100 INJECTION, SOLUTION SUBCUTANEOUS at 16:59

## 2018-09-29 RX ADMIN — FUROSEMIDE 20 MG: 20 TABLET ORAL at 13:00

## 2018-09-29 RX ADMIN — METOPROLOL SUCCINATE 50 MG: 25 TABLET, EXTENDED RELEASE ORAL at 05:04

## 2018-09-29 RX ADMIN — INSULIN HUMAN 4 UNITS: 100 INJECTION, SOLUTION PARENTERAL at 16:59

## 2018-09-29 RX ADMIN — POTASSIUM CHLORIDE 40 MEQ: 1500 TABLET, EXTENDED RELEASE ORAL at 08:35

## 2018-09-29 RX ADMIN — MAGNESIUM SULFATE HEPTAHYDRATE 2 G: 40 INJECTION, SOLUTION INTRAVENOUS at 09:20

## 2018-09-29 ASSESSMENT — ENCOUNTER SYMPTOMS
DIAPHORESIS: 0
LIGHT-HEADEDNESS: 0
FEVER: 0
PALPITATIONS: 0
DIZZINESS: 0
FATIGUE: 1
BACK PAIN: 0
CHILLS: 0
SORE THROAT: 0
MYALGIAS: 0
SHORTNESS OF BREATH: 1
RHINORRHEA: 0
CHEST TIGHTNESS: 0
NECK PAIN: 0
AGITATION: 0
ABDOMINAL PAIN: 0
NAUSEA: 0
STRIDOR: 0
CONSTIPATION: 0
HALLUCINATIONS: 0
COUGH: 0
PALPITATIONS: 1
BRUISES/BLEEDS EASILY: 0
SHORTNESS OF BREATH: 0

## 2018-09-29 ASSESSMENT — PAIN SCALES - GENERAL
PAINLEVEL_OUTOF10: 0
PAINLEVEL_OUTOF10: 2
PAINLEVEL_OUTOF10: 0
PAINLEVEL_OUTOF10: 1
PAINLEVEL_OUTOF10: 0
PAINLEVEL_OUTOF10: 1
PAINLEVEL_OUTOF10: 0

## 2018-09-29 NOTE — PROGRESS NOTES
Critical Care Progress Note    Date of admission  9/24/2018    Chief Complaint  58 y.o. male admitted 9/24/2018 with CP and SOB    Hospital Course    58 y.o. male with a past medical history of type 2 diabetes mellitus, coronary artery disease, alcohol abuse, hypertension, active cigarette smoking, presented to [facility] complaining of chest pain and shortness of breath over the last several days.  He was found to have a third-degree AV block and anterior ST elevation myocardial infarction on EKG with elevation of troponin was greater than 20.  He underwent cardiac catheterization that revealed complete thrombosis of LAD and ejection fraction 20%, and was deferred for cardiothoracic surgery evaluation for CABG.  Patient transferred to ICU with intra-aortic balloon pump in place, therefore ICU consult.  On further review of symptoms he admits to nausea and vomiting.        Interval Problem Update  Reviewed last 24 hour events:   - returned to ICU last night for A-fib with RVR --> amio gtt + metop + digoxin --> converted back to NSR   - AAox4   - AF, WBC remains at 12, plts up to 188   - garfield diet   - good UOP on lasix   - low K/Mag   - Na down to 126 from 133    Review of Systems  Review of Systems   Constitutional: Positive for fatigue. Negative for chills, diaphoresis and fever.   HENT: Negative for rhinorrhea and sore throat.    Eyes: Negative for visual disturbance.   Respiratory: Positive for shortness of breath. Negative for cough, chest tightness and stridor.    Cardiovascular: Positive for palpitations. Negative for chest pain and leg swelling.   Gastrointestinal: Negative for abdominal pain, constipation and nausea.   Endocrine: Negative for cold intolerance.   Genitourinary: Positive for frequency. Negative for decreased urine volume and difficulty urinating.   Musculoskeletal: Negative for back pain and neck pain.   Skin: Positive for pallor. Negative for rash.   Neurological: Negative for dizziness  and light-headedness.   Hematological: Does not bruise/bleed easily.   Psychiatric/Behavioral: Negative for agitation and hallucinations.   All other systems reviewed and are negative.       Vital Signs for last 24 hours   Temp:  [36.6 °C (97.8 °F)-37.4 °C (99.3 °F)] 36.6 °C (97.8 °F)  Pulse:  [] 78  Resp:  [13-28] 13  BP: ()/(59-79) 98/66    Hemodynamic parameters for last 24 hours       Vent Settings for last 24 hours   RA, IS encouraged    Physical Exam   Physical Exam   Constitutional: He is oriented to person, place, and time. He appears well-developed and well-nourished. No distress.   Up in chair again this morning although more pale and fatigued appearing   HENT:   Head: Normocephalic and atraumatic.   Mouth/Throat: Oropharynx is clear and moist. No oropharyngeal exudate.   Eyes: Pupils are equal, round, and reactive to light. Conjunctivae are normal. No scleral icterus.   Neck: Neck supple. No JVD present. No tracheal deviation present.   Cardiovascular: Normal rate, regular rhythm and intact distal pulses.   Occasional extrasystoles are present. PMI is displaced.  Exam reveals no distant heart sounds, no friction rub and no decreased pulses.    No murmur heard.  Left anterior chest wall with incision that is clean/dry/intact with palpable device underlying   Pulmonary/Chest: No tachypnea. No respiratory distress. He has no decreased breath sounds. He has no wheezes. He has no rhonchi. He has rales in the right lower field and the left lower field.   Abdominal: Soft. Bowel sounds are normal. There is no tenderness. There is no rebound and no guarding.   Musculoskeletal: He exhibits no edema or tenderness.   Mild clubbing   Neurological: He is alert and oriented to person, place, and time. No cranial nerve deficit. He exhibits normal muscle tone.   Generalized weakness   Skin: Skin is warm and dry. There is pallor.   Psychiatric: He has a normal mood and affect. His behavior is normal. Judgment  and thought content normal.   Nursing note and vitals reviewed.      Medications  Current Facility-Administered Medications   Medication Dose Route Frequency Provider Last Rate Last Dose   • insulin regular (HUMULIN R) injection 3-14 Units  3-14 Units Subcutaneous 4X/DAY ACHS Jeremy M Gonda, M.D.   Stopped at 09/29/18 0730    And   • glucose 4 g chewable tablet 16 g  16 g Oral Q15 MIN PRN Jeremy M Gonda, M.D.        And   • dextrose 50% (D50W) injection 25 mL  25 mL Intravenous Q15 MIN PRN Jeremy M Gonda, M.D.       • potassium chloride SA (Kdur) tablet 40 mEq  40 mEq Oral BID Jeremy M Gonda, M.D.       • magnesium sulfate IVPB premix 2 g  2 g Intravenous Once Jeremy M Gonda, M.D.       • Pharmacy Consult Request ...Pain Management Review 1 Each  1 Each Other PRN Douglas Cole M.D.       • acetaminophen (TYLENOL) tablet 650 mg  650 mg Oral Q6HRS PRN Douglas Cole M.D.       • Metoprolol Tartrate (LOPRESSOR) injection 5 mg  5 mg Intravenous Once Willy Oh M.D.   Stopped at 09/28/18 1815   • D5W infusion   Intravenous Continuous Willy Oh M.D. 30 mL/hr at 09/28/18 1956     • amiodarone (CORDARONE) 450 mg in D5W 250 mL Infusion  0.5-1 mg/min Intravenous Continuous Willy Oh M.D. 17 mL/hr at 09/29/18 0514 0.5 mg/min at 09/29/18 0514   • HYDROcodone/acetaminophen (NORCO)  MG per tablet 1 Tab  1 Tab Oral Q6HRS PRN Ledy ANGIE Tejada, A.P.N.   1 Tab at 09/28/18 1110   • metoprolol SR (TOPROL XL) tablet 50 mg  50 mg Oral Q DAY Russel Carrera M.D.   50 mg at 09/29/18 0504   • lisinopril (PRINIVIL) tablet 2.5 mg  2.5 mg Oral Q DAY Russel Carrera M.D.   2.5 mg at 09/29/18 0504   • furosemide (LASIX) injection 20 mg  20 mg Intravenous Q12HRS Jeremy M Gonda, M.D.   20 mg at 09/29/18 0504   • folic acid (FOLVITE) tablet 1 mg  1 mg Oral DAILY Jeremy M Gonda, M.D.   1 mg at 09/29/18 0503   • chlorproMAZINE (THORAZINE) tablet 25 mg  25 mg Oral 4X/DAY PRN Jeremy M Gonda, M.D.       • insulin glargine  (LANTUS) injection 30 Units  30 Units Subcutaneous Q EVENING Jeremy M Gonda, M.D.   30 Units at 09/28/18 1741   • MD Alert...ICU Electrolyte Replacement per Pharmacy   Other pharmacy to dose Jeremy M Gonda, M.D.       • enoxaparin (LOVENOX) inj 40 mg  40 mg Subcutaneous DAILY Jeremy M Gonda, M.D.   40 mg at 09/29/18 0504   • LORazepam (ATIVAN) injection 1-2 mg  1-2 mg Intravenous Q2HRS PRN Aditya Bower M.D.   1 mg at 09/25/18 0202   • hyoscyamine-maalox plus-lidocaine viscous (GI COCKTAIL) oral susp 15 mL  15 mL Oral Q6HRS PRN Russel Carrera M.D.       • aspirin EC (ECOTRIN) tablet 81 mg  81 mg Oral DAILY Dimas Henderson M.D.   81 mg at 09/29/18 0503   • Respiratory Care per Protocol   Nebulization Continuous RT Galina Taveras, A.P.N.       • ALPRAZolam (XANAX) tablet 0.25 mg  0.25 mg Oral Q6HRS PRN Galina Taveras, A.P.N.   0.25 mg at 09/26/18 2336   • nitroglycerin (NITROSTAT) tablet 0.4 mg  0.4 mg Sublingual Q5 MIN PRN Manuel Gonazlez M.D.   Stopped at 09/26/18 0916   • morphine (pf) 4 mg/ml injection 2-4 mg  2-4 mg Intravenous Q5 MIN PRN Manuel Gonzalez M.D.   Stopped at 09/26/18 0914   • senna-docusate (PERICOLACE or SENOKOT S) 8.6-50 MG per tablet 2 Tab  2 Tab Oral BID Manuel Gonzalez M.D.   2 Tab at 09/29/18 0503    And   • polyethylene glycol/lytes (MIRALAX) PACKET 1 Packet  1 Packet Oral QDAY PRN Manuel Gonzalez M.D.   1 Packet at 09/28/18 1120    And   • magnesium hydroxide (MILK OF MAGNESIA) suspension 30 mL  30 mL Oral QDAY PRN Manuel Gonzalez M.D.        And   • bisacodyl (DULCOLAX) suppository 10 mg  10 mg Rectal QDAY PRN Manuel Gonzalez M.D.       • nicotine (NICODERM) 21 MG/24HR 21 mg  21 mg Transdermal Daily-0600 Manuel Gonzalez M.D.   21 mg at 09/29/18 0506    And   • nicotine polacrilex (NICORETTE) 2 MG piece 2 mg  2 mg Oral Q HOUR PRN Manuel Gonzalez M.D.       • promethazine (PHENERGAN) tablet 25 mg  25 mg Oral Q6HRS PRN Manuel Gonzalez M.D.   25 mg at  09/24/18 2238   • promethazine (PHENERGAN) suppository 25 mg  25 mg Rectal Q6HRS PRN Manuel Gonzalez M.D.           Fluids    Intake/Output Summary (Last 24 hours) at 09/29/18 0825  Last data filed at 09/29/18 0600   Gross per 24 hour   Intake             1174 ml   Output             2150 ml   Net             -976 ml       Laboratory  Recent Results (from the past 48 hour(s))   EC-ECHOCARDIOGRAM LTD W/O CONT    Collection Time: 09/27/18 11:24 AM   Result Value Ref Range    Eject.Frac. MOD BP 46.01     Eject.Frac. MOD 4C 46.55     Eject.Frac. MOD 2C 47.86     Left Ventrical Ejection Fraction 25    ACCU-CHEK GLUCOSE    Collection Time: 09/27/18 12:10 PM   Result Value Ref Range    Glucose - Accu-Ck 266 (H) 65 - 99 mg/dL   ACCU-CHEK GLUCOSE    Collection Time: 09/27/18  5:51 PM   Result Value Ref Range    Glucose - Accu-Ck 260 (H) 65 - 99 mg/dL   ACCU-CHEK GLUCOSE    Collection Time: 09/27/18  7:55 PM   Result Value Ref Range    Glucose - Accu-Ck 281 (H) 65 - 99 mg/dL   CBC WITH DIFFERENTIAL    Collection Time: 09/28/18  4:45 AM   Result Value Ref Range    WBC 12.9 (H) 4.8 - 10.8 K/uL    RBC 4.37 (L) 4.70 - 6.10 M/uL    Hemoglobin 13.1 (L) 14.0 - 18.0 g/dL    Hematocrit 40.6 (L) 42.0 - 52.0 %    MCV 92.9 81.4 - 97.8 fL    MCH 30.0 27.0 - 33.0 pg    MCHC 32.3 (L) 33.7 - 35.3 g/dL    RDW 48.5 35.9 - 50.0 fL    Platelet Count 160 (L) 164 - 446 K/uL    MPV 10.5 9.0 - 12.9 fL    Neutrophils-Polys 71.30 44.00 - 72.00 %    Lymphocytes 12.30 (L) 22.00 - 41.00 %    Monocytes 14.20 (H) 0.00 - 13.40 %    Eosinophils 1.20 0.00 - 6.90 %    Basophils 0.50 0.00 - 1.80 %    Immature Granulocytes 0.50 0.00 - 0.90 %    Nucleated RBC 0.00 /100 WBC    Neutrophils (Absolute) 9.21 (H) 1.82 - 7.42 K/uL    Lymphs (Absolute) 1.58 1.00 - 4.80 K/uL    Monos (Absolute) 1.83 (H) 0.00 - 0.85 K/uL    Eos (Absolute) 0.15 0.00 - 0.51 K/uL    Baso (Absolute) 0.06 0.00 - 0.12 K/uL    Immature Granulocytes (abs) 0.06 0.00 - 0.11 K/uL    NRBC  (Absolute) 0.00 K/uL   BASIC METABOLIC PANEL    Collection Time: 18  4:45 AM   Result Value Ref Range    Sodium 133 (L) 135 - 145 mmol/L    Potassium 4.1 3.6 - 5.5 mmol/L    Chloride 98 96 - 112 mmol/L    Co2 26 20 - 33 mmol/L    Glucose 154 (H) 65 - 99 mg/dL    Bun 17 8 - 22 mg/dL    Creatinine 0.72 0.50 - 1.40 mg/dL    Calcium 8.2 (L) 8.5 - 10.5 mg/dL    Anion Gap 9.0 0.0 - 11.9   MAGNESIUM    Collection Time: 18  4:45 AM   Result Value Ref Range    Magnesium 1.9 1.5 - 2.5 mg/dL   PHOSPHORUS    Collection Time: 18  4:45 AM   Result Value Ref Range    Phosphorus 3.1 2.5 - 4.5 mg/dL   ESTIMATED GFR    Collection Time: 18  4:45 AM   Result Value Ref Range    GFR If African American >60 >60 mL/min/1.73 m 2    GFR If Non African American >60 >60 mL/min/1.73 m 2   ACCU-CHEK GLUCOSE    Collection Time: 18  8:30 AM   Result Value Ref Range    Glucose - Accu-Ck 131 (H) 65 - 99 mg/dL   ACCU-CHEK GLUCOSE    Collection Time: 18 10:55 AM   Result Value Ref Range    Glucose - Accu-Ck 169 (H) 65 - 99 mg/dL   EKG    Collection Time: 18  5:36 PM   Result Value Ref Range    Report       [facility]    Test Date:  2018  Pt Name:    KIRK FRITZ               Department: 183  MRN:        [id number]                      Room:       T808  Gender:     Male                         Technician: [facility]  :        1960                   Requested By:ZACH GIBSON  Order #:    [id number]                    Reading MD:    Measurements  Intervals                                Axis  Rate:       144                          P:  IA:                                      QRS:        -86  QRSD:       138                          T:          17  QT:         336  QTc:        520    Interpretive Statements  ATRIAL FIBRILLATION  RIGHT BUNDLE BRANCH BLOCK  INFERIOR INFARCT, AGE INDETERMINATE  EXTENSIVE ANTERIOR INFARCT, ACUTE  Compared to ECG 2018 09:15:57  Right bundle-branch block now  present  Myocardial infarct finding now present  Ventricular-paced complex(es) or rhythm no longer present  Sinus tachycardia no longer present  AV block, complete (third-degree) no l onger present     ACCU-CHEK GLUCOSE    Collection Time: 09/28/18  5:37 PM   Result Value Ref Range    Glucose - Accu-Ck 216 (H) 65 - 99 mg/dL   ACCU-CHEK GLUCOSE    Collection Time: 09/28/18  8:59 PM   Result Value Ref Range    Glucose - Accu-Ck 203 (H) 65 - 99 mg/dL   CBC WITH DIFFERENTIAL    Collection Time: 09/29/18  4:58 AM   Result Value Ref Range    WBC 12.1 (H) 4.8 - 10.8 K/uL    RBC 3.99 (L) 4.70 - 6.10 M/uL    Hemoglobin 12.0 (L) 14.0 - 18.0 g/dL    Hematocrit 36.1 (L) 42.0 - 52.0 %    MCV 90.5 81.4 - 97.8 fL    MCH 30.1 27.0 - 33.0 pg    MCHC 33.2 (L) 33.7 - 35.3 g/dL    RDW 45.7 35.9 - 50.0 fL    Platelet Count 188 164 - 446 K/uL    MPV 10.5 9.0 - 12.9 fL    Neutrophils-Polys 63.60 44.00 - 72.00 %    Lymphocytes 18.50 (L) 22.00 - 41.00 %    Monocytes 14.20 (H) 0.00 - 13.40 %    Eosinophils 2.70 0.00 - 6.90 %    Basophils 0.50 0.00 - 1.80 %    Immature Granulocytes 0.50 0.00 - 0.90 %    Nucleated RBC 0.00 /100 WBC    Neutrophils (Absolute) 7.67 (H) 1.82 - 7.42 K/uL    Lymphs (Absolute) 2.24 1.00 - 4.80 K/uL    Monos (Absolute) 1.72 (H) 0.00 - 0.85 K/uL    Eos (Absolute) 0.33 0.00 - 0.51 K/uL    Baso (Absolute) 0.06 0.00 - 0.12 K/uL    Immature Granulocytes (abs) 0.06 0.00 - 0.11 K/uL    NRBC (Absolute) 0.00 K/uL   COMP METABOLIC PANEL    Collection Time: 09/29/18  4:58 AM   Result Value Ref Range    Sodium 126 (L) 135 - 145 mmol/L    Potassium 3.2 (L) 3.6 - 5.5 mmol/L    Chloride 90 (L) 96 - 112 mmol/L    Co2 27 20 - 33 mmol/L    Anion Gap 9.0 0.0 - 11.9    Glucose 362 (H) 65 - 99 mg/dL    Bun 13 8 - 22 mg/dL    Creatinine 0.61 0.50 - 1.40 mg/dL    Calcium 7.7 (L) 8.5 - 10.5 mg/dL    AST(SGOT) 32 12 - 45 U/L    ALT(SGPT) 77 (H) 2 - 50 U/L    Alkaline Phosphatase 76 30 - 99 U/L    Total Bilirubin 1.2 0.1 - 1.5 mg/dL    Albumin  2.8 (L) 3.2 - 4.9 g/dL    Total Protein 5.6 (L) 6.0 - 8.2 g/dL    Globulin 2.8 1.9 - 3.5 g/dL    A-G Ratio 1.0 g/dL   MAGNESIUM    Collection Time: 09/29/18  4:58 AM   Result Value Ref Range    Magnesium 1.9 1.5 - 2.5 mg/dL   PHOSPHORUS    Collection Time: 09/29/18  4:58 AM   Result Value Ref Range    Phosphorus 2.8 2.5 - 4.5 mg/dL   ESTIMATED GFR    Collection Time: 09/29/18  4:58 AM   Result Value Ref Range    GFR If African American >60 >60 mL/min/1.73 m 2    GFR If Non African American >60 >60 mL/min/1.73 m 2   ACCU-CHEK GLUCOSE    Collection Time: 09/29/18  7:20 AM   Result Value Ref Range    Glucose - Accu-Ck 93 65 - 99 mg/dL       Imaging  X-Ray:  I have personally reviewed the images and compared with prior images. and My impression is: Decrease in mild edema with enlarged cardiac silhouette, retrocardiac opacification, left anterior chest wall AICD in good position.      Assessment/Plan  * STEMI (ST elevation myocardial infarction) ([facility])   Assessment & Plan    S/P angioplasty 9/24, no stent  Cont Aspirin  Beta-blocker, ACE inhibitor  Prn nitrates        Atrial fibrillation with rapid ventricular response ([facility])   Assessment & Plan    Currently back in normal sinus rhythm  Amiodarone drip, metoprolol, digoxin  Anticoagulation plan?        CAD (coronary artery disease)   Assessment & Plan    Multivessel but not CABG candidate  Medical management        Cardiogenic shock ([facility])   Assessment & Plan    Resolved         Diabetic ketoacidosis associated with type 2 diabetes mellitus ([facility])   Assessment & Plan    Continue Lantus and sliding scale insulin  Diabetic diet        Ischemic cardiomyopathy- (present on admission)   Assessment & Plan    Diuresis  Beta-blocker, ACE inhibitor        Hyponatremia   Assessment & Plan    Worsening  Monitor with diuresis (changed to p.o.)        Leukocytosis   Assessment & Plan    Maintains elevated, afebrile  Doubt infection and monitor off antibiotics        Elevated LFTs    Assessment & Plan              Hypomagnesemia   Assessment & Plan    Replete today and monitor        Hypokalemia   Assessment & Plan    Replete today, monitor        Thrombocytopenia (HCC)   Assessment & Plan    Continues to improve daily, no signs of bleeding        Acute-on-chronic kidney injury (HCC)- (present on admission)   Assessment & Plan    ATN -improved  Avoid nephrotoxins        HTN (hypertension), malignant   Assessment & Plan    Continue beta-blocker and ACE inhibitor        Smoking   Assessment & Plan    Tobacco cessation education        Complete heart block by electrocardiogram (HCC)   Assessment & Plan    S/P AICD/pacer placed on 9/26  Optimize electrolytes             VTE:  Lovenox  Ulcer: Not Indicated  Lines: None    I have performed a physical exam and reviewed and updated ROS and Plan today (9/29/2018). In review of yesterday's note (9/28/2018), there are no changes except as documented above.      Patient is critically ill today requiring my active management of his tenuous cardiac status including optimizing electrolytes, monitoring for further arrhythmias, amiodarone drip titration, managing fluid status closely. High risk of deterioration and worsening vital organ dysfunction and death without the above critical care interventions.    Discussed patient condition and risk of morbidity and/or mortality with RN, RT, Pharmacy, Charge nurse / hot rounds, Patient and cardiology.  Critical care time: 31 minutes.  No time overlap.  Procedures are not included in this time.

## 2018-09-29 NOTE — ASSESSMENT & PLAN NOTE
Now in sinus rhythm  Decrease amiodarone, 200 mg twice daily  Continue metoprolol SR, 50 mg daily  Optimize potassium and magnesium  Continue full anticoagulation with heparin drip  Start Coumadin

## 2018-09-29 NOTE — PROGRESS NOTES
Monitor Summary    Pt transferred from [facility] to [facility] at 1930 for a -fib RVR. Converted to SR at approximately 2200. For remainder of shift SR with BBB rate 70-90s, with occasional PACs/ PVCs.     0.24/0.12/0.42

## 2018-09-29 NOTE — CARE PLAN
Problem: Bowel/Gastric:  Goal: Normal bowel function is maintained or improved    Intervention: Educate patient and significant other/support system about diet, fluid intake, medications and activity to promote bowel function  Patient educated on mobilization, increasing fluid intake, and medication use to promote bowel function       Problem: Respiratory:  Goal: Respiratory status will improve    Intervention: Assess and monitor pulmonary status  Patient encouraged to cough and deep breathe   Intervention: Administer and titrate oxygen therapy  Patient on RA

## 2018-09-29 NOTE — CODE DOCUMENTATION
MD notified of RR and pt's increased rate and possible ST elevation. Order for 5mg of metoprolol received.

## 2018-09-29 NOTE — PROGRESS NOTES
Cardiology Follow Up Progress Note    Date of Service  9/29/2018    Attending Physician  Willy Oh M.D.    Chief Complaint   Coronary artery disease    HPI  Nisa Ross is a 58 y.o. male admitted 9/24/2018 with STEMI withanterior myocardial infarction and complete heart block.     Interim Events  Turned down for coronary artery bypass graft surgery.    Review of Systems  Review of Systems   Constitutional: Negative for chills and fever.   HENT: Negative for congestion.    Respiratory: Negative for shortness of breath.    Cardiovascular: Negative for chest pain, palpitations and leg swelling.   Gastrointestinal: Negative for abdominal pain.   Genitourinary: Negative for dysuria.   Musculoskeletal: Negative for myalgias.   Skin: Negative for rash.   Neurological: Negative for dizziness.       Vital signs in last 24 hours  Temp:  [36.4 °C (97.5 °F)-37.4 °C (99.3 °F)] 36.6 °C (97.8 °F)  Pulse:  [] 75  Resp:  [15-28] 21  BP: ()/(59-79) 98/66    Physical Exam  Physical Exam   Constitutional: He is oriented to person, place, and time. He appears well-developed and well-nourished.   HENT:   Head: Normocephalic and atraumatic.   Eyes: Pupils are equal, round, and reactive to light. Conjunctivae are normal.   Neck: Normal range of motion. Neck supple.   Cardiovascular: Normal rate and regular rhythm.    Pulmonary/Chest: Effort normal and breath sounds normal.   Abdominal: Soft. Bowel sounds are normal.   Musculoskeletal: Normal range of motion. He exhibits no edema.   Neurological: He is alert and oriented to person, place, and time.   Skin: Skin is warm and dry.   Psychiatric: He has a normal mood and affect.       Lab Review  Lab Results   Component Value Date/Time    WBC 12.1 (H) 09/29/2018 04:58 AM    RBC 3.99 (L) 09/29/2018 04:58 AM    HEMOGLOBIN 12.0 (L) 09/29/2018 04:58 AM    HEMATOCRIT 36.1 (L) 09/29/2018 04:58 AM    MCV 90.5 09/29/2018 04:58 AM    MCH 30.1 09/29/2018 04:58 AM    MCHC 33.2  (L) 09/29/2018 04:58 AM    MPV 10.5 09/29/2018 04:58 AM      Lab Results   Component Value Date/Time    SODIUM 126 (L) 09/29/2018 04:58 AM    POTASSIUM 3.2 (L) 09/29/2018 04:58 AM    CHLORIDE 90 (L) 09/29/2018 04:58 AM    CO2 27 09/29/2018 04:58 AM    GLUCOSE 362 (H) 09/29/2018 04:58 AM    BUN 13 09/29/2018 04:58 AM    CREATININE 0.61 09/29/2018 04:58 AM      Lab Results   Component Value Date/Time    ASTSGOT 32 09/29/2018 04:58 AM    ALTSGPT 77 (H) 09/29/2018 04:58 AM     Lab Results   Component Value Date/Time    CHOLSTRLTOT 128 09/25/2018 01:28 AM    LDL 85 09/25/2018 01:28 AM    HDL 24 (A) 09/25/2018 01:28 AM    TRIGLYCERIDE 94 09/25/2018 01:28 AM    TROPONINI 43.85 (H) 09/25/2018 01:28 AM             Cardiac Imaging and Procedures Review/Imaging    CARDIAC STUDIES/PROCEDURES:    CARDIAC CATHETERIZATION CONCLUSIONS (09/24/18)  1.  Multivessel coronary artery disease with occluded ostial proximal left   anterior descending artery with long dissection of the mid portion, large   diagonal branch with proximal concentric 99% stenosis, large ramus intermedius   with proximal concentric 80% stenosis, nondominant circumflex artery,   moderate in caliber with small obtuse marginal branch without significant   stenosis, dominant right coronary artery which is occluded at the ostium with   small to moderate posterior descending artery and posterior lateral branches   filling via left to right collaterals.  2.  Reduced left ventricular systolic function with ejection fraction of 20%.  3.  Elevated left ventricular end-diastolic pressure.  4.  Successful intraaortic balloon pump placement.  5.  Successful temporary pacemaker placement  (study result reviewed)    CAROTID ULTRASOUND (09/24/18)  Mild bilateral internal carotid artery stenosis (<50%).   (study result reviewed)    ECHOCARDIOGRAM CONCLUSIONS (09/27/18)  Prior echo 09/26/18, findings are similar  Requested as limited to eval for pericaridal effusion.  LV function  about 20-25% visually.  Mild global hypokinesis with LAD territory severe hypo to akinesis.  Trivial pericardial fluid noted.  (study result reviewed)    EKG performed on (09/28) was reviewed: EKG shows atrial fibrillation with right bundle branch block..    Laboratory results of (09/25/18) were reviewed. Troponin levels of 43 ng/ml noted.    Assessment/Plan  * STEMI (ST elevation myocardial infarction) ([facility])   Assessment & Plan    Patient is maintaining an adequate blood pressure in spite of his severe ischemic cardiomyopathy.  We will continue increasing his medical therapy has tolerated.  He appears stable for telemetry at this time.        Ischemic cardiomyopathy- (present on admission)   Assessment & Plan    Again, we will increase his beta-blocker and ACE inhibitor therapy today.  Prior to discharge, will need to decide whether or not to proceed to intervention on the large diagonal and large ramus intermedius arteries which both have significant proximal lesions.        Complete heart block by electrocardiogram ([facility])   Assessment & Plan    Patient is post AICD placement.  Again, a left ventricular lead was unable to be placed.  No significant pericardial effusion noted on echocardiography.  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  He has had return of AV conduction and has not needed a significant amount of pacing.          1. Coronary artery disease: He remains clinically stable.  We will continue with current medical care. He may need further percutaneous intervention with stent placements of both his diagonal branch and ramus intermedius stenosis. He wishes to discuss options with his brother.  2  Ischemic cardiomyopathy with AICD by Dr. Cole (09/26/18): No activity noted or reported.  3. Atrial fibrillation: He went into atrial fibrillation, however, went back into sinus rhythm on IV amiodarone. He will be changed to oral amiodarone when his IV bag runs out. He may be started on  IV heparin and warfarin.     Thank you for allowing me to participate in the care of this patient.  I will continue to follow this patient    Please contact me with any questions.    Dimas Henderson M.D.   Cardiologist, [facility]  [phone]

## 2018-09-29 NOTE — CARE PLAN
Problem: Communication  Goal: The ability to communicate needs accurately and effectively will improve    Intervention: [place] patient and significant other/support system to call light to alert staff of needs  Patient demonstrated how to use the call light appropriately.       Problem: Safety  Goal: Will remain free from injury    Intervention: Provide assistance with mobility  Patient needs assistance while ambulating and agreed to call the nurse before ambulating on his own. Bed alarm and chair alarm is used. Patient uses walker at home.       Problem: Skin Integrity  Goal: Risk for impaired skin integrity will decrease    Intervention: Assess risk factors for impaired skin integrity and/or pressure ulcers  Educated patient the importance of turning in bed from side to side to prevent pressure ulcers.

## 2018-09-29 NOTE — PROGRESS NOTES
Inpatient Anticoagulation Service Note    Date: 9/29/2018  Reason for Anticoagulation: Atrial Fibrillation      Hemoglobin Value: (!) 12  Hematocrit Value: (!) 36.1  Lab Platelet Value: 188  Target INR: 2.0 to 3.0    INR from last 7 days     Date/Time INR Value    09/29/18 1500  1.13    09/24/18 2100 (!)  2.49    09/24/18 1722 (!)  1.34        Dose from last 7 days     Date/Time Dose (mg)    09/29/18 1600  5        Significant Interactions: Amiodarone, Aspirin  Bridge Therapy: Yes  Bridge Therapy Start Date: 09/29/18  Days of Overlap Therapy: 1     Reversal Agent Administered: Not Applicable  Comments and Plan: Anticoagulation being initiated today for atrial fibrilation.  Noted DDI of amiodarone.  Will initiate therapy with 5 mg dose this evening.  Pharmacy to follow daily while inpatient.       Pharmacist suggested discharge dosing: Pending response to warfarin therapy initiation.  TBD     Thank you,  Trice Michael, PharmD

## 2018-09-29 NOTE — ASSESSMENT & PLAN NOTE
Multivessel disease  He is not a candidate for CABG at this time  Continue aspirin  Resume statin  Continue metoprolol SR, 50 mg daily  Fully anticoagulated on heparin drip

## 2018-09-29 NOTE — PROGRESS NOTES
2 RN skin check completed with Reggie Turk in place. Dry skin noted to chest and bruising to BUE.

## 2018-09-29 NOTE — PROGRESS NOTES
Bedside report received from Tele 8 RN. Patient resting comfortably. VSS. All questions answered and patient transferred to CIC on monitor.

## 2018-09-30 PROBLEM — I50.21 ACUTE SYSTOLIC HEART FAILURE (HCC): Status: ACTIVE | Noted: 2018-09-25

## 2018-09-30 PROBLEM — I21.02 STEMI INVOLVING LEFT ANTERIOR DESCENDING CORONARY ARTERY (HCC): Status: ACTIVE | Noted: 2018-09-24

## 2018-09-30 LAB
ANION GAP SERPL CALC-SCNC: 10 MMOL/L (ref 0–11.9)
APTT PPP: 65.1 SEC (ref 24.7–36)
APTT PPP: 75.4 SEC (ref 24.7–36)
BACTERIA BLD CULT: NORMAL
BACTERIA BLD CULT: NORMAL
BUN SERPL-MCNC: 12 MG/DL (ref 8–22)
CALCIUM SERPL-MCNC: 8.1 MG/DL (ref 8.5–10.5)
CHLORIDE SERPL-SCNC: 96 MMOL/L (ref 96–112)
CO2 SERPL-SCNC: 30 MMOL/L (ref 20–33)
CREAT SERPL-MCNC: 0.57 MG/DL (ref 0.5–1.4)
EKG IMPRESSION: NORMAL
ERYTHROCYTE [DISTWIDTH] IN BLOOD BY AUTOMATED COUNT: 45.5 FL (ref 35.9–50)
GLUCOSE BLD-MCNC: 105 MG/DL (ref 65–99)
GLUCOSE BLD-MCNC: 123 MG/DL (ref 65–99)
GLUCOSE BLD-MCNC: 157 MG/DL (ref 65–99)
GLUCOSE BLD-MCNC: 58 MG/DL (ref 65–99)
GLUCOSE BLD-MCNC: 74 MG/DL (ref 65–99)
GLUCOSE SERPL-MCNC: 84 MG/DL (ref 65–99)
HCT VFR BLD AUTO: 40.4 % (ref 42–52)
HGB BLD-MCNC: 13.6 G/DL (ref 14–18)
INR PPP: 1.14 (ref 0.87–1.13)
MCH RBC QN AUTO: 30 PG (ref 27–33)
MCHC RBC AUTO-ENTMCNC: 33.7 G/DL (ref 33.7–35.3)
MCV RBC AUTO: 89.2 FL (ref 81.4–97.8)
PLATELET # BLD AUTO: 247 K/UL (ref 164–446)
PMV BLD AUTO: 9.9 FL (ref 9–12.9)
POTASSIUM SERPL-SCNC: 3.7 MMOL/L (ref 3.6–5.5)
PROTHROMBIN TIME: 14.8 SEC (ref 12–14.6)
RBC # BLD AUTO: 4.53 M/UL (ref 4.7–6.1)
SIGNIFICANT IND 70042: NORMAL
SIGNIFICANT IND 70042: NORMAL
SITE SITE: NORMAL
SITE SITE: NORMAL
SODIUM SERPL-SCNC: 136 MMOL/L (ref 135–145)
SOURCE SOURCE: NORMAL
SOURCE SOURCE: NORMAL
TROPONIN I SERPL-MCNC: 5.83 NG/ML (ref 0–0.04)
TROPONIN I SERPL-MCNC: 6.64 NG/ML (ref 0–0.04)
WBC # BLD AUTO: 14.9 K/UL (ref 4.8–10.8)

## 2018-09-30 PROCEDURE — 84484 ASSAY OF TROPONIN QUANT: CPT

## 2018-09-30 PROCEDURE — 700102 HCHG RX REV CODE 250 W/ 637 OVERRIDE(OP): Performed by: INTERNAL MEDICINE

## 2018-09-30 PROCEDURE — 99233 SBSQ HOSP IP/OBS HIGH 50: CPT | Performed by: INTERNAL MEDICINE

## 2018-09-30 PROCEDURE — 700111 HCHG RX REV CODE 636 W/ 250 OVERRIDE (IP): Performed by: INTERNAL MEDICINE

## 2018-09-30 PROCEDURE — 85027 COMPLETE CBC AUTOMATED: CPT

## 2018-09-30 PROCEDURE — A9270 NON-COVERED ITEM OR SERVICE: HCPCS | Performed by: INTERNAL MEDICINE

## 2018-09-30 PROCEDURE — 700102 HCHG RX REV CODE 250 W/ 637 OVERRIDE(OP): Performed by: NURSE PRACTITIONER

## 2018-09-30 PROCEDURE — 80048 BASIC METABOLIC PNL TOTAL CA: CPT

## 2018-09-30 PROCEDURE — 700102 HCHG RX REV CODE 250 W/ 637 OVERRIDE(OP): Performed by: FAMILY MEDICINE

## 2018-09-30 PROCEDURE — 85730 THROMBOPLASTIN TIME PARTIAL: CPT | Mod: 91

## 2018-09-30 PROCEDURE — 82962 GLUCOSE BLOOD TEST: CPT | Mod: 91

## 2018-09-30 PROCEDURE — 700111 HCHG RX REV CODE 636 W/ 250 OVERRIDE (IP): Performed by: FAMILY MEDICINE

## 2018-09-30 PROCEDURE — 85610 PROTHROMBIN TIME: CPT

## 2018-09-30 PROCEDURE — A9270 NON-COVERED ITEM OR SERVICE: HCPCS | Performed by: FAMILY MEDICINE

## 2018-09-30 PROCEDURE — 770022 HCHG ROOM/CARE - ICU (200)

## 2018-09-30 PROCEDURE — A9270 NON-COVERED ITEM OR SERVICE: HCPCS | Performed by: NURSE PRACTITIONER

## 2018-09-30 RX ORDER — POTASSIUM CHLORIDE 20 MEQ/1
40 TABLET, EXTENDED RELEASE ORAL ONCE
Status: COMPLETED | OUTPATIENT
Start: 2018-09-30 | End: 2018-09-30

## 2018-09-30 RX ORDER — CALCIUM CARBONATE 500 MG/1
500 TABLET, CHEWABLE ORAL EVERY 6 HOURS PRN
Status: DISCONTINUED | OUTPATIENT
Start: 2018-09-30 | End: 2018-10-05 | Stop reason: HOSPADM

## 2018-09-30 RX ADMIN — LISINOPRIL 2.5 MG: 5 TABLET ORAL at 05:44

## 2018-09-30 RX ADMIN — FOLIC ACID 1 MG: 1 TABLET ORAL at 05:44

## 2018-09-30 RX ADMIN — STANDARDIZED SENNA CONCENTRATE AND DOCUSATE SODIUM 2 TABLET: 8.6; 5 TABLET ORAL at 16:16

## 2018-09-30 RX ADMIN — AMIODARONE HYDROCHLORIDE 400 MG: 200 TABLET ORAL at 05:44

## 2018-09-30 RX ADMIN — POTASSIUM CHLORIDE 40 MEQ: 1500 TABLET, EXTENDED RELEASE ORAL at 08:25

## 2018-09-30 RX ADMIN — FUROSEMIDE 20 MG: 20 TABLET ORAL at 05:44

## 2018-09-30 RX ADMIN — INSULIN HUMAN 3 UNITS: 100 INJECTION, SOLUTION PARENTERAL at 18:06

## 2018-09-30 RX ADMIN — ASPIRIN 81 MG: 81 TABLET, COATED ORAL at 05:43

## 2018-09-30 RX ADMIN — LORAZEPAM 1 MG: 2 INJECTION INTRAMUSCULAR; INTRAVENOUS at 05:10

## 2018-09-30 RX ADMIN — MORPHINE SULFATE 2 MG: 4 INJECTION INTRAVENOUS at 00:34

## 2018-09-30 RX ADMIN — AMIODARONE HYDROCHLORIDE 400 MG: 200 TABLET ORAL at 16:16

## 2018-09-30 RX ADMIN — HEPARIN SODIUM 1450 UNITS: 5000 INJECTION, SOLUTION INTRAVENOUS at 09:22

## 2018-09-30 RX ADMIN — METOPROLOL SUCCINATE 50 MG: 25 TABLET, EXTENDED RELEASE ORAL at 08:24

## 2018-09-30 RX ADMIN — STANDARDIZED SENNA CONCENTRATE AND DOCUSATE SODIUM 2 TABLET: 8.6; 5 TABLET ORAL at 05:45

## 2018-09-30 RX ADMIN — FUROSEMIDE 20 MG: 20 TABLET ORAL at 16:16

## 2018-09-30 RX ADMIN — NICOTINE 21 MG: 21 PATCH, EXTENDED RELEASE TRANSDERMAL at 05:10

## 2018-09-30 RX ADMIN — LORAZEPAM 1 MG: 2 INJECTION INTRAMUSCULAR; INTRAVENOUS at 21:27

## 2018-09-30 ASSESSMENT — ENCOUNTER SYMPTOMS
CONSTIPATION: 0
CONFUSION: 0
FEVER: 0
PALPITATIONS: 0
CHILLS: 0
NAUSEA: 0
EYE PAIN: 0
MYALGIAS: 0
DIARRHEA: 0
WEAKNESS: 0
NERVOUS/ANXIOUS: 0
HALLUCINATIONS: 0
ABDOMINAL PAIN: 0
DIAPHORESIS: 0
CHEST TIGHTNESS: 1
VOMITING: 0
EYE REDNESS: 0
FACIAL SWELLING: 0
STRIDOR: 0
ARTHRALGIAS: 0
CHOKING: 0
SINUS PAIN: 0
JOINT SWELLING: 0
ADENOPATHY: 0
NAUSEA: 1
NECK PAIN: 0
SHORTNESS OF BREATH: 0
DIZZINESS: 0
SPEECH DIFFICULTY: 0
POLYDIPSIA: 0
TROUBLE SWALLOWING: 0
PHOTOPHOBIA: 0
SORE THROAT: 0
HEADACHES: 0
CHEST TIGHTNESS: 0
COLOR CHANGE: 0
BRUISES/BLEEDS EASILY: 0
POLYPHAGIA: 0
VOMITING: 1
TREMORS: 0

## 2018-09-30 ASSESSMENT — PAIN SCALES - GENERAL
PAINLEVEL_OUTOF10: 10
PAINLEVEL_OUTOF10: 0
PAINLEVEL_OUTOF10: 0
PAINLEVEL_OUTOF10: 3
PAINLEVEL_OUTOF10: 3
PAINLEVEL_OUTOF10: 0
PAINLEVEL_OUTOF10: 0
PAINLEVEL_OUTOF10: 1
PAINLEVEL_OUTOF10: 0
PAINLEVEL_OUTOF10: 3
PAINLEVEL_OUTOF10: 0
PAINLEVEL_OUTOF10: 0

## 2018-09-30 ASSESSMENT — CHA2DS2 SCORE
CHA2DS2 VASC SCORE: 3
AGE 75 OR GREATER: NO
SEX: MALE
PRIOR STROKE OR TIA OR THROMBOEMBOLISM: NO
VASCULAR DISEASE: YES
HYPERTENSION: NO
AGE 65 TO 74: NO
DIABETES: YES
CHF OR LEFT VENTRICULAR DYSFUNCTION: YES

## 2018-09-30 NOTE — PROGRESS NOTES
Monitor Summary: .22/.12/.36. Rhythm: Occasionally paced, SR, 1st degree HB, & BBB. Rate: 70's-80's.

## 2018-09-30 NOTE — PROGRESS NOTES
Patients capillary blood sugar was 55. Checked another site and it was 58. Patient is alert and oriented. Gave the patient 2 orange juice and advised patient to finish his breakfast. Patient did not receive regular insulin this morning due to blood sugar was 74. Patient has been feeling nauseous and vomited a few times throughout the night. Will reassess the blood sugar after he finishes his meal.

## 2018-09-30 NOTE — PROGRESS NOTES
Dr. Henderson at bedside. Informed him that last night the patient vomited twice and was experiencing chest pain at that time. Dr. Henderson reviewed last nights EKG. Dr. Henderson spoke to the patient regarding the incident and decided to plan for stent placement tomorrow. Patient will stay on CIC, RN will monitor trops and will update Cardiology with any chest pain.

## 2018-09-30 NOTE — PROGRESS NOTES
Critical Care Progress Note    Date of admission  9/24/2018    Chief Complaint  58 y.o. male admitted 9/24/2018 with chest pain.    Hospital Course    This gentleman was admitted to the hospital with chest pain.  He underwent cardiac catheterization which revealed severe multivessel CAD.  He had heart block and had an ICD implanted.  He has of severe cardiomyopathy.  Cardiothoracic surgery has evaluated him and he is not a candidate for CABG at this time.        Interval Problem Update  Reviewed last 24 hour events:       -AF with RVR   -now in SR - occ paced   -tele status   -replete K   -oriented x 4   -hep 1450   -Coumadin      He has a cough with minimal sputum production.  He has no worsening dyspnea.  He denies angina, palpitations or syncope.  He has no hemoptysis.  He has no abdominal pain, nausea or vomiting.      Review of Systems  Review of Systems   Constitutional: Negative for chills, diaphoresis and fever.   HENT: Negative for drooling, ear pain, facial swelling, nosebleeds, sinus pain, sore throat and trouble swallowing.    Eyes: Negative for photophobia, pain and redness.   Respiratory: Negative for choking, chest tightness and stridor.    Cardiovascular: Negative for chest pain and palpitations.   Gastrointestinal: Negative for abdominal pain, constipation, diarrhea, nausea and vomiting.   Endocrine: Negative for polydipsia, polyphagia and polyuria.   Genitourinary: Negative for dysuria, frequency, hematuria and urgency.   Musculoskeletal: Negative for arthralgias, joint swelling, myalgias and neck pain.   Skin: Negative for color change, pallor and rash.   Allergic/Immunologic: Negative for environmental allergies and food allergies.   Neurological: Negative for tremors, syncope, speech difficulty, weakness and headaches.   Hematological: Negative for adenopathy. Does not bruise/bleed easily.   Psychiatric/Behavioral: Negative for confusion, hallucinations and self-injury. The patient is not  nervous/anxious.         Vital Signs for last 24 hours   Temp:  [35.9 °C (96.6 °F)-37 °C (98.6 °F)] 36.3 °C (97.4 °F)  Pulse:  [74-83] 82  Resp:  [0-24] 12    Hemodynamic parameters for last 24 hours       Vent Settings for last 24 hours       Physical Exam   Physical Exam   Constitutional: He is oriented to person, place, and time. No distress.   HENT:   Head: Normocephalic and atraumatic.   Right Ear: External ear normal.   Left Ear: External ear normal.   Nose: Nose normal.   Mouth/Throat: Oropharynx is clear and moist.   Eyes: Pupils are equal, round, and reactive to light. Conjunctivae and EOM are normal. Right eye exhibits no discharge. Left eye exhibits no discharge. No scleral icterus.   Neck: Normal range of motion. Neck supple. No JVD present. No tracheal deviation present.   Cardiovascular: Exam reveals no gallop and no friction rub.    Paced rhythm   Pulmonary/Chest: Effort normal and breath sounds normal. No stridor. No respiratory distress. He has no wheezes. He has no rales.   Abdominal: Soft. Bowel sounds are normal. He exhibits no distension. There is no tenderness. There is no rebound.   Musculoskeletal: He exhibits no tenderness or deformity.   No clubbing or cyanosis   Neurological: He is alert and oriented to person, place, and time. No cranial nerve deficit. Coordination normal.   No focal weakness   Skin: Skin is warm and dry. No rash noted. He is not diaphoretic. No erythema. No pallor.   Psychiatric: He has a normal mood and affect.       Medications  Current Facility-Administered Medications   Medication Dose Route Frequency Provider Last Rate Last Dose   • insulin regular (HUMULIN R) injection 3-14 Units  3-14 Units Subcutaneous 4X/DAY ACHS Jeremy M Gonda, M.D.   Stopped at 09/29/18 2100    And   • glucose 4 g chewable tablet 16 g  16 g Oral Q15 MIN PRN Jeremy M Gonda, M.D.        And   • dextrose 50% (D50W) injection 25 mL  25 mL Intravenous Q15 MIN PRN Jeremy M Gonda, M.D.       •  furosemide (LASIX) tablet 20 mg  20 mg Oral BID DIURETIC Jeremy M Gonda, M.D.   20 mg at 09/30/18 0544   • amiodarone (CORDARONE) tablet 400 mg  400 mg Oral TWICE DAILY ANGIE Vickers.P.NMio   400 mg at 09/30/18 0544   • MD Alert...Warfarin per Pharmacy   Other pharmacy to dose DENISE VickersP.ROLY.       • MD Alert...HEPARIN WEIGHT BASED PROTOCOL Pharmacist to implement   Other PRN DENISE iVckersP.N.       • heparin injection 3,200 Units  3,200 Units Intravenous PRN Jeremy M Gonda, M.D.   3,200 Units at 09/29/18 2244    And   • heparin infusion 25,000 units in 500 ml 0.45% nacl   Intravenous Continuous Jeremy M Gonda, M.D. 29 mL/hr at 09/30/18 0551 1,450 Units/hr at 09/30/18 0551   • Pharmacy Consult Request ...Pain Management Review 1 Each  1 Each Other PRN Douglas Cole M.D.       • acetaminophen (TYLENOL) tablet 650 mg  650 mg Oral Q6HRS PRN Douglas Cole M.D.       • HYDROcodone/acetaminophen (NORCO)  MG per tablet 1 Tab  1 Tab Oral Q6HRS PRN DENISE CarrPMioNMio   1 Tab at 09/28/18 1110   • metoprolol SR (TOPROL XL) tablet 50 mg  50 mg Oral Q DAY Russel Carrera M.D.   50 mg at 09/29/18 0504   • lisinopril (PRINIVIL) tablet 2.5 mg  2.5 mg Oral Q DAY Russel Carrera M.D.   2.5 mg at 09/30/18 0544   • folic acid (FOLVITE) tablet 1 mg  1 mg Oral DAILY Jeremy M Gonda, M.D.   1 mg at 09/30/18 0544   • chlorproMAZINE (THORAZINE) tablet 25 mg  25 mg Oral 4X/DAY PRN Jeremy M Gonda, M.D.       • insulin glargine (LANTUS) injection 30 Units  30 Units Subcutaneous Q EVENING Jeremy M Gonda, M.D.   30 Units at 09/29/18 1659   • MD Alert...ICU Electrolyte Replacement per Pharmacy   Other pharmacy to dose Jeremy M Gonda, M.D.       • LORazepam (ATIVAN) injection 1-2 mg  1-2 mg Intravenous Q2HRS PRN Aditya Bower M.D.   1 mg at 09/30/18 0510   • hyoscyamine-maalox plus-lidocaine viscous (GI COCKTAIL) oral susp 15 mL  15 mL Oral Q6HRS PRN Russel Carrera M.D.   15 mL at 09/29/18 1831   • aspirin EC  (ECOTRIN) tablet 81 mg  81 mg Oral DAILY Dimas Henderson M.D.   81 mg at 09/30/18 0543   • Respiratory Care per Protocol   Nebulization Continuous RT Galina Taveras, A.P.N.       • ALPRAZolam (XANAX) tablet 0.25 mg  0.25 mg Oral Q6HRS PRN Galina Taveras, A.P.N.   0.25 mg at 09/26/18 2336   • nitroglycerin (NITROSTAT) tablet 0.4 mg  0.4 mg Sublingual Q5 MIN PRN Manuel Gonzalez M.D.   Stopped at 09/26/18 0916   • morphine (pf) 4 mg/ml injection 2-4 mg  2-4 mg Intravenous Q5 MIN PRN Manuel Gonzalez M.D.   2 mg at 09/30/18 0034   • senna-docusate (PERICOLACE or SENOKOT S) 8.6-50 MG per tablet 2 Tab  2 Tab Oral BID Manuel Gonzalez M.D.   2 Tab at 09/30/18 0545    And   • polyethylene glycol/lytes (MIRALAX) PACKET 1 Packet  1 Packet Oral QDAY PRROLY Gonzalez M.D.   1 Packet at 09/28/18 1120    And   • magnesium hydroxide (MILK OF MAGNESIA) suspension 30 mL  30 mL Oral QDAY PRROLY Gonzalez M.D.        And   • bisacodyl (DULCOLAX) suppository 10 mg  10 mg Rectal QDAY PRROLY Gonzalez M.D.       • nicotine (NICODERM) 21 MG/24HR 21 mg  21 mg Transdermal Daily-0600 Manuel Gonzalez M.D.   21 mg at 09/30/18 0510    And   • nicotine polacrilex (NICORETTE) 2 MG piece 2 mg  2 mg Oral Q HOUR MARTHA Gonzalez M.D.       • promethazine (PHENERGAN) tablet 25 mg  25 mg Oral Q6HRS PRROLY Gonzalez M.D.   25 mg at 09/24/18 2238   • promethazine (PHENERGAN) suppository 25 mg  25 mg Rectal Q6HRS PRN Manuel Gonzalez M.D.           Fluids    Intake/Output Summary (Last 24 hours) at 09/30/18 0627  Last data filed at 09/30/18 0400   Gross per 24 hour   Intake          1380.82 ml   Output             2725 ml   Net         -1344.18 ml       Laboratory  Recent Results (from the past 48 hour(s))   ACCU-CHEK GLUCOSE    Collection Time: 09/28/18  8:30 AM   Result Value Ref Range    Glucose - Accu-Ck 131 (H) 65 - 99 mg/dL   ACCU-CHEK GLUCOSE    Collection Time: 09/28/18 10:55 AM   Result Value Ref  Range    Glucose - Accu-Ck 169 (H) 65 - 99 mg/dL   EKG    Collection Time: 18  5:36 PM   Result Value Ref Range    Report       [facility]    Test Date:  2018  Pt Name:    KIRK FRITZ               Department: 183  MRN:        [id number]                      Room:       12  Gender:     Male                         Technician: [facility]  :        1960                   Requested By:ZACH GIBSON  Order #:    [id number]                    Reading MD: Dimas Henderson MD    Measurements  Intervals                                Axis  Rate:       144                          P:  IL:                                      QRS:        -86  QRSD:       138                          T:          17  QT:         336  QTc:        520    Interpretive Statements  ATRIAL FIBRILLATION  RIGHT BUNDLE BRANCH BLOCK  INFERIOR INFARCT, AGE INDETERMINATE  EXTENSIVE ANTERIOR INFARCT, ACUTE  Compared to ECG 2018 09:15:57  Ventricular-paced complex(es) or rhythm no longer present      Electronically Signed On 2018 14:09:06 PDT by Dimas Henderson MD     ACCU-CHEK GLUCOSE    Collection Time: 18  5:37 PM   Result Value Ref Range    Glucose - Accu-Ck 216 (H) 65 - 99 mg/dL   ACCU-CHEK GLUCOSE    Collection Time: 18  8:59 PM   Result Value Ref Range    Glucose - Accu-Ck 203 (H) 65 - 99 mg/dL   CBC WITH DIFFERENTIAL    Collection Time: 18  4:58 AM   Result Value Ref Range    WBC 12.1 (H) 4.8 - 10.8 K/uL    RBC 3.99 (L) 4.70 - 6.10 M/uL    Hemoglobin 12.0 (L) 14.0 - 18.0 g/dL    Hematocrit 36.1 (L) 42.0 - 52.0 %    MCV 90.5 81.4 - 97.8 fL    MCH 30.1 27.0 - 33.0 pg    MCHC 33.2 (L) 33.7 - 35.3 g/dL    RDW 45.7 35.9 - 50.0 fL    Platelet Count 188 164 - 446 K/uL    MPV 10.5 9.0 - 12.9 fL    Neutrophils-Polys 63.60 44.00 - 72.00 %    Lymphocytes 18.50 (L) 22.00 - 41.00 %    Monocytes 14.20 (H) 0.00 - 13.40 %    Eosinophils 2.70 0.00 - 6.90 %    Basophils 0.50 0.00 - 1.80 %    Immature Granulocytes  0.50 0.00 - 0.90 %    Nucleated RBC 0.00 /100 WBC    Neutrophils (Absolute) 7.67 (H) 1.82 - 7.42 K/uL    Lymphs (Absolute) 2.24 1.00 - 4.80 K/uL    Monos (Absolute) 1.72 (H) 0.00 - 0.85 K/uL    Eos (Absolute) 0.33 0.00 - 0.51 K/uL    Baso (Absolute) 0.06 0.00 - 0.12 K/uL    Immature Granulocytes (abs) 0.06 0.00 - 0.11 K/uL    NRBC (Absolute) 0.00 K/uL   COMP METABOLIC PANEL    Collection Time: 09/29/18  4:58 AM   Result Value Ref Range    Sodium 126 (L) 135 - 145 mmol/L    Potassium 3.2 (L) 3.6 - 5.5 mmol/L    Chloride 90 (L) 96 - 112 mmol/L    Co2 27 20 - 33 mmol/L    Anion Gap 9.0 0.0 - 11.9    Glucose 362 (H) 65 - 99 mg/dL    Bun 13 8 - 22 mg/dL    Creatinine 0.61 0.50 - 1.40 mg/dL    Calcium 7.7 (L) 8.5 - 10.5 mg/dL    AST(SGOT) 32 12 - 45 U/L    ALT(SGPT) 77 (H) 2 - 50 U/L    Alkaline Phosphatase 76 30 - 99 U/L    Total Bilirubin 1.2 0.1 - 1.5 mg/dL    Albumin 2.8 (L) 3.2 - 4.9 g/dL    Total Protein 5.6 (L) 6.0 - 8.2 g/dL    Globulin 2.8 1.9 - 3.5 g/dL    A-G Ratio 1.0 g/dL   MAGNESIUM    Collection Time: 09/29/18  4:58 AM   Result Value Ref Range    Magnesium 1.9 1.5 - 2.5 mg/dL   PHOSPHORUS    Collection Time: 09/29/18  4:58 AM   Result Value Ref Range    Phosphorus 2.8 2.5 - 4.5 mg/dL   ESTIMATED GFR    Collection Time: 09/29/18  4:58 AM   Result Value Ref Range    GFR If African American >60 >60 mL/min/1.73 m 2    GFR If Non African American >60 >60 mL/min/1.73 m 2   ACCU-CHEK GLUCOSE    Collection Time: 09/29/18  7:20 AM   Result Value Ref Range    Glucose - Accu-Ck 93 65 - 99 mg/dL   ACCU-CHEK GLUCOSE    Collection Time: 09/29/18 12:59 PM   Result Value Ref Range    Glucose - Accu-Ck 223 (H) 65 - 99 mg/dL   APTT    Collection Time: 09/29/18  3:00 PM   Result Value Ref Range    APTT 32.2 24.7 - 36.0 sec   PROTHROMBIN TIME    Collection Time: 09/29/18  3:00 PM   Result Value Ref Range    PT 14.6 12.0 - 14.6 sec    INR 1.13 0.87 - 1.13   ACCU-CHEK GLUCOSE    Collection Time: 09/29/18  4:56 PM   Result Value  Ref Range    Glucose - Accu-Ck 220 (H) 65 - 99 mg/dL   ACCU-CHEK GLUCOSE    Collection Time: 18  9:47 PM   Result Value Ref Range    Glucose - Accu-Ck 98 65 - 99 mg/dL   APTT    Collection Time: 18  9:55 PM   Result Value Ref Range    APTT 49.9 (H) 24.7 - 36.0 sec   EKG    Collection Time: 18 10:52 PM   Result Value Ref Range    Report       [facility]    Test Date:  2018  Pt Name:    KIRK FRITZ               Department: 161  MRN:        [id number]                      Room:       [facility]  Gender:     Male                         Technician: [facility]  :        1960                   Requested By:SCOOBY STEIN  Order #:    [id number]                    Reading MD:    Measurements  Intervals                                Axis  Rate:       71                           P:          24  NC:         234                          QRS:        267  QRSD:       150                          T:          92  QT:         464  QTc:        505    Interpretive Statements  VENTRICULAR-PACED COMPLEXES  NO FURTHER ANALYSIS ATTEMPTED DUE TO PACED RHYTHM  BASELINE WANDER IN LEAD(S) V6  Compared to ECG 2018 17:36:59  Atrial fibrillation no longer present  Right bundle-branch block no longer present  Myocardial infarct finding no longer present     PROTHROMBIN TIME    Collection Time: 18  4:56 AM   Result Value Ref Range    PT 14.8 (H) 12.0 - 14.6 sec    INR 1.14 (H) 0.87 - 1.13   BASIC METABOLIC PANEL    Collection Time: 18  4:56 AM   Result Value Ref Range    Sodium 136 135 - 145 mmol/L    Potassium 3.7 3.6 - 5.5 mmol/L    Chloride 96 96 - 112 mmol/L    Co2 30 20 - 33 mmol/L    Glucose 84 65 - 99 mg/dL    Bun 12 8 - 22 mg/dL    Creatinine 0.57 0.50 - 1.40 mg/dL    Calcium 8.1 (L) 8.5 - 10.5 mg/dL    Anion Gap 10.0 0.0 - 11.9   CBC WITHOUT DIFFERENTIAL    Collection Time: 18  4:56 AM   Result Value Ref Range    WBC 14.9 (H) 4.8 - 10.8 K/uL    RBC 4.53 (L) 4.70 - 6.10 M/uL     Hemoglobin 13.6 (L) 14.0 - 18.0 g/dL    Hematocrit 40.4 (L) 42.0 - 52.0 %    MCV 89.2 81.4 - 97.8 fL    MCH 30.0 27.0 - 33.0 pg    MCHC 33.7 33.7 - 35.3 g/dL    RDW 45.5 35.9 - 50.0 fL    Platelet Count 247 164 - 446 K/uL    MPV 9.9 9.0 - 12.9 fL   APTT    Collection Time: 09/30/18  4:56 AM   Result Value Ref Range    APTT 75.4 (H) 24.7 - 36.0 sec   ESTIMATED GFR    Collection Time: 09/30/18  4:56 AM   Result Value Ref Range    GFR If African American >60 >60 mL/min/1.73 m 2    GFR If Non African American >60 >60 mL/min/1.73 m 2   ACCU-CHEK GLUCOSE    Collection Time: 09/30/18  5:49 AM   Result Value Ref Range    Glucose - Accu-Ck 74 65 - 99 mg/dL       Imaging  X-Ray:  I have personally reviewed the images and compared with prior images. and My impression is: Clear lungs.      Assessment/Plan  * STEMI involving left anterior descending coronary artery (HCC)   Assessment & Plan    S/P angioplasty with thrombectomy of the LAD on 9/24  No stents were placed  Continue aspirin  Continue metoprolol SR, 50 mg daily        Atrial fibrillation with rapid ventricular response (HCC)   Assessment & Plan    Now in sinus rhythm  Continue amiodarone, 400 mg twice daily  Continue metoprolol SR, 50 mg daily  Optimize potassium and magnesium  Anticoagulated with heparin drip        CAD (coronary artery disease)   Assessment & Plan    Multivessel disease  Not a candidate for CABG  Continue aspirin  Continue metoprolol SR, 50 mg daily  Continue anticoagulation with heparin  Will need intervention on his diagonal branch and ramus intermedius - scheduled for tomorrow        Cardiogenic shock (HCC)   Assessment & Plan    Shock has resolved        Acute systolic heart failure (HCC)- (present on admission)   Assessment & Plan    EF 20%  Continue lisinopril, 2.5 mg daily  ICD placed on 9/26        Acute-on-chronic kidney injury (HCC)- (present on admission)   Assessment & Plan    Resolved  Avoid nephrotoxins        Type 2 diabetes mellitus  ([facility])- (present on admission)   Assessment & Plan    Continue Lantus, 30 units in the evening  Continue sliding scale insulin        Complete heart block by electrocardiogram ([facility])   Assessment & Plan    S/P ICD/pacemaker placement on 9/26             VTE:  Coumadin  Ulcer: Not Indicated  Lines: None    I have performed a physical exam and reviewed and updated ROS and Plan today (9/30/2018). In review of yesterday's note (9/29/2018), there are no changes except as documented above.      Discussed patient condition and risk of morbidity and/or mortality with RN, RT, Pharmacy, Charge nurse / hot rounds, QA team and Patient.    OK to transfer out of ICU.  [facility] will sign off on transfer.  Please call if you have any questions.    Ronni Reyna MD  Pulmonary and Critical Care Medicine

## 2018-09-30 NOTE — CARE PLAN
Problem: Bowel/Gastric:  Goal: Will not experience complications related to bowel motility   09/30/18 0000   OTHER   Last BM 09/29/18  (Per report)   Number of Times Stooled 0       Problem: Knowledge Deficit  Goal: Knowledge of the prescribed therapeutic regimen will improve    Intervention: Discuss information regarding therpeutic regimen and document in education  Information discussed regarding therapeutic regimen. Documented in education.

## 2018-09-30 NOTE — PROGRESS NOTES
Cardiology Follow Up Progress Note    Date of Service  9/30/2018    Attending Physician  Willy Oh M.D.    Chief Complaint   Coronary artery disease.    HPI  Nisa Ross is a 58 y.o. male admitted 9/24/2018 with STEMI with anterior myocardial infarction, multivessel coronary artery disease, ischemic cardiomyopathy and complete heart block with AICD.    Interim Events  Turned down for coronary artery bypass graft surgery.    Review of Systems  Review of Systems   Constitutional: Negative for chills and fever.   HENT: Negative for congestion.    Respiratory: Positive for chest tightness. Negative for shortness of breath.    Cardiovascular: Negative for chest pain, palpitations and leg swelling.   Gastrointestinal: Positive for nausea and vomiting. Negative for abdominal pain.   Genitourinary: Negative for dysuria.   Musculoskeletal: Negative for myalgias.   Skin: Negative for rash.   Neurological: Negative for dizziness.     Vital signs in last 24 hours  Temp:  [35.9 °C (96.6 °F)-37 °C (98.6 °F)] 36.7 °C (98.1 °F)  Pulse:  [74-92] 86  Resp:  [0-30] 30    Physical Exam  Physical Exam   Constitutional: He is oriented to person, place, and time. He appears well-developed and well-nourished.   HENT:   Head: Normocephalic and atraumatic.   Eyes: Pupils are equal, round, and reactive to light. Conjunctivae are normal.   Neck: Normal range of motion. Neck supple.   Cardiovascular: Normal rate and regular rhythm.    Pulmonary/Chest: Effort normal and breath sounds normal.   Abdominal: Soft. Bowel sounds are normal.   Musculoskeletal: Normal range of motion. He exhibits no edema.   Left arm sling.   Neurological: He is alert and oriented to person, place, and time.   Skin: Skin is warm and dry.   Psychiatric: He has a normal mood and affect.   (ROS essentially unchanged from 09/29/18)    Lab Review  Lab Results   Component Value Date/Time    WBC 14.9 (H) 09/30/2018 04:56 AM    RBC 4.53 (L) 09/30/2018 04:56 AM     HEMOGLOBIN 13.6 (L) 09/30/2018 04:56 AM    HEMATOCRIT 40.4 (L) 09/30/2018 04:56 AM    MCV 89.2 09/30/2018 04:56 AM    MCH 30.0 09/30/2018 04:56 AM    MCHC 33.7 09/30/2018 04:56 AM    MPV 9.9 09/30/2018 04:56 AM      Lab Results   Component Value Date/Time    SODIUM 136 09/30/2018 04:56 AM    POTASSIUM 3.7 09/30/2018 04:56 AM    CHLORIDE 96 09/30/2018 04:56 AM    CO2 30 09/30/2018 04:56 AM    GLUCOSE 84 09/30/2018 04:56 AM    BUN 12 09/30/2018 04:56 AM    CREATININE 0.57 09/30/2018 04:56 AM      Lab Results   Component Value Date/Time    ASTSGOT 32 09/29/2018 04:58 AM    ALTSGPT 77 (H) 09/29/2018 04:58 AM     Lab Results   Component Value Date/Time    CHOLSTRLTOT 128 09/25/2018 01:28 AM    LDL 85 09/25/2018 01:28 AM    HDL 24 (A) 09/25/2018 01:28 AM    TRIGLYCERIDE 94 09/25/2018 01:28 AM    TROPONINI 43.85 (H) 09/25/2018 01:28 AM             Cardiac Imaging and Procedures Review/Imaging    CARDIAC STUDIES/PROCEDURES:    CARDIAC CATHETERIZATION CONCLUSIONS (09/24/18)  1.  Multivessel coronary artery disease with occluded ostial proximal left   anterior descending artery with long dissection of the mid portion, large   diagonal branch with proximal concentric 99% stenosis, large ramus intermedius   with proximal concentric 80% stenosis, nondominant circumflex artery,   moderate in caliber with small obtuse marginal branch without significant   stenosis, dominant right coronary artery which is occluded at the ostium with   small to moderate posterior descending artery and posterior lateral branches   filling via left to right collaterals.  2.  Reduced left ventricular systolic function with ejection fraction of 20%.  3.  Elevated left ventricular end-diastolic pressure.  4.  Successful intraaortic balloon pump placement.  5.  Successful temporary pacemaker placement    CAROTID ULTRASOUND (09/24/18)  Mild bilateral internal carotid artery stenosis (<50%).     ECHOCARDIOGRAM CONCLUSIONS (09/27/18)  Prior echo 09/26/18,  findings are similar  Requested as limited to eval for pericaridal effusion.  LV function about 20-25% visually.  Mild global hypokinesis with LAD territory severe hypo to akinesis.  Trivial pericardial fluid noted.    EKG performed on (09/29/18) was reviewed: EKG shows paced rhythm with anterior ST elevation.  EKG performed on (09/28/18) EKG shows atrial fibrillation with right bundle branch block..    Laboratory results of (09/25/18) Troponin levels of 43 ng/ml noted.    Assessment/Plan  * STEMI (ST elevation myocardial infarction) ([facility])   Assessment & Plan    Patient is maintaining an adequate blood pressure in spite of his severe ischemic cardiomyopathy.  We will continue increasing his medical therapy has tolerated.  He appears stable for telemetry at this time.        Ischemic cardiomyopathy- (present on admission)   Assessment & Plan    Again, we will increase his beta-blocker and ACE inhibitor therapy today.  Prior to discharge, will need to decide whether or not to proceed to intervention on the large diagonal and large ramus intermedius arteries which both have significant proximal lesions.        Complete heart block by electrocardiogram ([facility])   Assessment & Plan    Patient is post AICD placement.  Again, a left ventricular lead was unable to be placed.  No significant pericardial effusion noted on echocardiography.  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  He has had return of AV conduction and has not needed a significant amount of pacing.          1. Coronary artery disease: He suffered an episode of chest pain with nausea and vomiting last night. The plan as of yesterday formulated with Dr. Hightower was that he would be treated medically for 6 weeks and he would be recathed at that time. If the left anterior descending artery flow improved then he was to undergo coronary artery bypass graft surgery. However, due to his recurrent angina, we will proceed with percutaneous  intervention with stent placements of both his diagonal branch and ramus intermedius stenosis tomorrow. He understands the risks and benefits and agrees with plan.  2  Ischemic cardiomyopathy with AICD by Dr. Cole (09/26/18): No activity noted or reported.  3. Atrial fibrillation on anticoagulation therapy (warfarin): He is doing well in sinus rhythm on amiodarone and on anticoagulation.    Thank you for allowing me to participate in the care of this patient.  I will continue to follow this patient    Please contact me with any questions.    Dimas Henderson M.D.   Cardiologist, [facility]  [phone]

## 2018-10-01 ENCOUNTER — TELEPHONE (OUTPATIENT)
Dept: CARDIOLOGY | Facility: MEDICAL CENTER | Age: 58
End: 2018-10-01

## 2018-10-01 LAB
ANION GAP SERPL CALC-SCNC: 9 MMOL/L (ref 0–11.9)
BASOPHILS # BLD AUTO: 0.4 % (ref 0–1.8)
BASOPHILS # BLD: 0.06 K/UL (ref 0–0.12)
BUN SERPL-MCNC: 10 MG/DL (ref 8–22)
CALCIUM SERPL-MCNC: 8.4 MG/DL (ref 8.5–10.5)
CHLORIDE SERPL-SCNC: 95 MMOL/L (ref 96–112)
CO2 SERPL-SCNC: 30 MMOL/L (ref 20–33)
CREAT SERPL-MCNC: 0.54 MG/DL (ref 0.5–1.4)
EOSINOPHIL # BLD AUTO: 0.09 K/UL (ref 0–0.51)
EOSINOPHIL NFR BLD: 0.6 % (ref 0–6.9)
ERYTHROCYTE [DISTWIDTH] IN BLOOD BY AUTOMATED COUNT: 45.2 FL (ref 35.9–50)
GLUCOSE BLD-MCNC: 107 MG/DL (ref 65–99)
GLUCOSE BLD-MCNC: 236 MG/DL (ref 65–99)
GLUCOSE BLD-MCNC: 340 MG/DL (ref 65–99)
GLUCOSE BLD-MCNC: 45 MG/DL (ref 65–99)
GLUCOSE BLD-MCNC: 70 MG/DL (ref 65–99)
GLUCOSE BLD-MCNC: 92 MG/DL (ref 65–99)
GLUCOSE BLD-MCNC: 92 MG/DL (ref 65–99)
GLUCOSE SERPL-MCNC: 58 MG/DL (ref 65–99)
HCT VFR BLD AUTO: 35.9 % (ref 42–52)
HGB BLD-MCNC: 12.6 G/DL (ref 14–18)
IMM GRANULOCYTES # BLD AUTO: 0.13 K/UL (ref 0–0.11)
IMM GRANULOCYTES NFR BLD AUTO: 0.9 % (ref 0–0.9)
INR PPP: 1.23 (ref 0.87–1.13)
LYMPHOCYTES # BLD AUTO: 1.73 K/UL (ref 1–4.8)
LYMPHOCYTES NFR BLD: 11.7 % (ref 22–41)
MAGNESIUM SERPL-MCNC: 2 MG/DL (ref 1.5–2.5)
MCH RBC QN AUTO: 31 PG (ref 27–33)
MCHC RBC AUTO-ENTMCNC: 35.1 G/DL (ref 33.7–35.3)
MCV RBC AUTO: 88.2 FL (ref 81.4–97.8)
MONOCYTES # BLD AUTO: 1.37 K/UL (ref 0–0.85)
MONOCYTES NFR BLD AUTO: 9.2 % (ref 0–13.4)
NEUTROPHILS # BLD AUTO: 11.44 K/UL (ref 1.82–7.42)
NEUTROPHILS NFR BLD: 77.2 % (ref 44–72)
NRBC # BLD AUTO: 0 K/UL
NRBC BLD-RTO: 0 /100 WBC
PLATELET # BLD AUTO: 253 K/UL (ref 164–446)
PMV BLD AUTO: 10.5 FL (ref 9–12.9)
POTASSIUM SERPL-SCNC: 4.4 MMOL/L (ref 3.6–5.5)
PROTHROMBIN TIME: 15.6 SEC (ref 12–14.6)
RBC # BLD AUTO: 4.07 M/UL (ref 4.7–6.1)
SODIUM SERPL-SCNC: 134 MMOL/L (ref 135–145)
WBC # BLD AUTO: 14.8 K/UL (ref 4.8–10.8)

## 2018-10-01 PROCEDURE — 80048 BASIC METABOLIC PNL TOTAL CA: CPT

## 2018-10-01 PROCEDURE — 700111 HCHG RX REV CODE 636 W/ 250 OVERRIDE (IP): Performed by: INTERNAL MEDICINE

## 2018-10-01 PROCEDURE — 700102 HCHG RX REV CODE 250 W/ 637 OVERRIDE(OP): Performed by: INTERNAL MEDICINE

## 2018-10-01 PROCEDURE — A9270 NON-COVERED ITEM OR SERVICE: HCPCS | Performed by: FAMILY MEDICINE

## 2018-10-01 PROCEDURE — 82962 GLUCOSE BLOOD TEST: CPT

## 2018-10-01 PROCEDURE — 770020 HCHG ROOM/CARE - TELE (206)

## 2018-10-01 PROCEDURE — 90686 IIV4 VACC NO PRSV 0.5 ML IM: CPT | Performed by: INTERNAL MEDICINE

## 2018-10-01 PROCEDURE — 700101 HCHG RX REV CODE 250: Performed by: INTERNAL MEDICINE

## 2018-10-01 PROCEDURE — 90471 IMMUNIZATION ADMIN: CPT

## 2018-10-01 PROCEDURE — 83735 ASSAY OF MAGNESIUM: CPT

## 2018-10-01 PROCEDURE — 3E02340 INTRODUCTION OF INFLUENZA VACCINE INTO MUSCLE, PERCUTANEOUS APPROACH: ICD-10-PCS | Performed by: INTERNAL MEDICINE

## 2018-10-01 PROCEDURE — A9270 NON-COVERED ITEM OR SERVICE: HCPCS | Performed by: INTERNAL MEDICINE

## 2018-10-01 PROCEDURE — 99233 SBSQ HOSP IP/OBS HIGH 50: CPT | Performed by: INTERNAL MEDICINE

## 2018-10-01 PROCEDURE — 700102 HCHG RX REV CODE 250 W/ 637 OVERRIDE(OP): Performed by: FAMILY MEDICINE

## 2018-10-01 PROCEDURE — 85610 PROTHROMBIN TIME: CPT

## 2018-10-01 PROCEDURE — 85025 COMPLETE CBC W/AUTO DIFF WBC: CPT

## 2018-10-01 PROCEDURE — 99233 SBSQ HOSP IP/OBS HIGH 50: CPT | Mod: 24 | Performed by: INTERNAL MEDICINE

## 2018-10-01 RX ORDER — SPIRONOLACTONE 25 MG/1
25 TABLET ORAL
Status: DISCONTINUED | OUTPATIENT
Start: 2018-10-01 | End: 2018-10-05 | Stop reason: HOSPADM

## 2018-10-01 RX ORDER — LISINOPRIL 5 MG/1
5 TABLET ORAL
Status: DISCONTINUED | OUTPATIENT
Start: 2018-10-02 | End: 2018-10-02

## 2018-10-01 RX ORDER — WARFARIN SODIUM 5 MG/1
5 TABLET ORAL
Status: DISCONTINUED | OUTPATIENT
Start: 2018-10-01 | End: 2018-10-03

## 2018-10-01 RX ORDER — ATORVASTATIN CALCIUM 40 MG/1
40 TABLET, FILM COATED ORAL EVERY EVENING
Status: DISCONTINUED | OUTPATIENT
Start: 2018-10-01 | End: 2018-10-01

## 2018-10-01 RX ORDER — AMIODARONE HYDROCHLORIDE 200 MG/1
200 TABLET ORAL TWICE DAILY
Status: DISCONTINUED | OUTPATIENT
Start: 2018-10-01 | End: 2018-10-03

## 2018-10-01 RX ORDER — LIDOCAINE HYDROCHLORIDE 10 MG/ML
0.5 INJECTION, SOLUTION INFILTRATION; PERINEURAL
Status: ACTIVE | OUTPATIENT
Start: 2018-10-01 | End: 2018-10-02

## 2018-10-01 RX ORDER — ATORVASTATIN CALCIUM 80 MG/1
80 TABLET, FILM COATED ORAL EVERY EVENING
Status: DISCONTINUED | OUTPATIENT
Start: 2018-10-01 | End: 2018-10-05 | Stop reason: HOSPADM

## 2018-10-01 RX ADMIN — STANDARDIZED SENNA CONCENTRATE AND DOCUSATE SODIUM 2 TABLET: 8.6; 5 TABLET ORAL at 17:28

## 2018-10-01 RX ADMIN — AMIODARONE HYDROCHLORIDE 200 MG: 200 TABLET ORAL at 17:27

## 2018-10-01 RX ADMIN — INFLUENZA A VIRUS A/MICHIGAN/45/2015 X-275 (H1N1) ANTIGEN (FORMALDEHYDE INACTIVATED), INFLUENZA A VIRUS A/SINGAPORE/INFIMH-16-0019/2016 IVR-186 (H3N2) ANTIGEN (FORMALDEHYDE INACTIVATED), INFLUENZA B VIRUS B/PHUKET/3073/2013 ANTIGEN (FORMALDEHYDE INACTIVATED), AND INFLUENZA B VIRUS B/MARYLAND/15/2016 BX-69A ANTIGEN (FORMALDEHYDE INACTIVATED) 0.5 ML: 15; 15; 15; 15 INJECTION, SUSPENSION INTRAMUSCULAR at 17:28

## 2018-10-01 RX ADMIN — WARFARIN SODIUM 5 MG: 5 TABLET ORAL at 17:27

## 2018-10-01 RX ADMIN — INSULIN HUMAN 10 UNITS: 100 INJECTION, SOLUTION PARENTERAL at 22:12

## 2018-10-01 RX ADMIN — HEPARIN SODIUM 1450 UNITS/HR: 5000 INJECTION, SOLUTION INTRAVENOUS at 20:56

## 2018-10-01 RX ADMIN — HEPARIN SODIUM 1450 UNITS/HR: 5000 INJECTION, SOLUTION INTRAVENOUS at 02:52

## 2018-10-01 RX ADMIN — ATORVASTATIN CALCIUM 80 MG: 80 TABLET, FILM COATED ORAL at 17:27

## 2018-10-01 RX ADMIN — LORAZEPAM 2 MG: 2 INJECTION INTRAMUSCULAR; INTRAVENOUS at 23:23

## 2018-10-01 RX ADMIN — DEXTROSE MONOHYDRATE 25 ML: 25 INJECTION, SOLUTION INTRAVENOUS at 08:22

## 2018-10-01 RX ADMIN — NICOTINE 21 MG: 21 PATCH, EXTENDED RELEASE TRANSDERMAL at 06:00

## 2018-10-01 RX ADMIN — DEXTROSE MONOHYDRATE 25 ML: 25 INJECTION, SOLUTION INTRAVENOUS at 06:12

## 2018-10-01 RX ADMIN — SPIRONOLACTONE 25 MG: 25 TABLET ORAL at 11:12

## 2018-10-01 ASSESSMENT — ENCOUNTER SYMPTOMS
EYE DISCHARGE: 0
SEIZURES: 0
STRIDOR: 0
SPEECH DIFFICULTY: 0
HYPERACTIVE: 0
FLANK PAIN: 0
APPETITE CHANGE: 0
PALPITATIONS: 0
ARTHRALGIAS: 0
NEUROLOGICAL NEGATIVE: 1
FATIGUE: 1
FACIAL ASYMMETRY: 0
BRUISES/BLEEDS EASILY: 0
NERVOUS/ANXIOUS: 0
TREMORS: 0
NAUSEA: 0
ADENOPATHY: 0
TROUBLE SWALLOWING: 0
APNEA: 0
BLOOD IN STOOL: 0
ABDOMINAL DISTENTION: 0
VOMITING: 0
ABDOMINAL PAIN: 0
EYE ITCHING: 0
SHORTNESS OF BREATH: 0
WHEEZING: 0
AGITATION: 0
SINUS PRESSURE: 0
COLOR CHANGE: 0
HALLUCINATIONS: 0

## 2018-10-01 ASSESSMENT — PAIN SCALES - GENERAL
PAINLEVEL_OUTOF10: 0

## 2018-10-01 NOTE — PROGRESS NOTES
Critical Care Progress Note    Date of admission  9/24/2018    Chief Complaint  58 y.o. male admitted 9/24/2018 with chest pain.    Hospital Course    This gentleman was admitted to the hospital with chest pain.  He underwent cardiac catheterization which revealed severe multivessel CAD.  He had heart block and had an ICD implanted.  He has of severe cardiomyopathy.  Cardiothoracic surgery has evaluated him and he is not a candidate for CABG at this time.        Interval Problem Update  Reviewed last 24 hour events:       -cath today with PCI   -low glucose   -paced   -hep gtt 1450   -Coumadin   -resume statin      He denies any worsening dyspnea or cough.  He has no hemoptysis.  He denies angina, palpitations or syncope.  He has no nausea, vomiting or abdominal pain.  He is feeling better today.      Review of Systems  Review of Systems   Constitutional: Positive for fatigue. Negative for appetite change.   HENT: Negative for ear discharge, mouth sores, nosebleeds, sinus pressure, tinnitus and trouble swallowing.    Eyes: Negative for discharge and itching.   Respiratory: Negative for apnea, wheezing and stridor.    Cardiovascular: Negative for palpitations.   Gastrointestinal: Negative for abdominal distention, blood in stool, nausea and vomiting.   Endocrine: Negative for cold intolerance and heat intolerance.   Genitourinary: Negative for dysuria, flank pain, hematuria and urgency.   Musculoskeletal: Negative for arthralgias and gait problem.   Skin: Negative for color change, pallor and rash.   Allergic/Immunologic: Negative for environmental allergies and immunocompromised state.   Neurological: Negative for tremors, seizures, syncope, facial asymmetry and speech difficulty.   Hematological: Negative for adenopathy. Does not bruise/bleed easily.   Psychiatric/Behavioral: Negative for behavioral problems, hallucinations and self-injury. The patient is not nervous/anxious and is not hyperactive.         Vital  Signs for last 24 hours   Temp:  [36.4 °C (97.5 °F)-36.8 °C (98.3 °F)] 36.8 °C (98.3 °F)  Pulse:  [77-87] 77  Resp:  [6-33] 14    Hemodynamic parameters for last 24 hours       Vent Settings for last 24 hours       Physical Exam   Physical Exam   Constitutional: He is oriented to person, place, and time. No distress.   HENT:   Head: Normocephalic.   Right Ear: External ear normal.   Left Ear: External ear normal.   Mouth/Throat: No oropharyngeal exudate.   Eyes: Pupils are equal, round, and reactive to light. Conjunctivae are normal. Right eye exhibits no discharge. Left eye exhibits no discharge. No scleral icterus.   Neck: Normal range of motion. Neck supple. No JVD present. No tracheal deviation present.   Cardiovascular: Exam reveals no gallop and no friction rub.    Paced rhythm   Pulmonary/Chest: Effort normal and breath sounds normal. No stridor. No respiratory distress. He has no wheezes. He has no rales.   Abdominal: Soft. Bowel sounds are normal. He exhibits no distension. There is no tenderness. There is no guarding.   Musculoskeletal: Normal range of motion. He exhibits no tenderness or deformity.   No clubbing or cyanosis   Neurological: He is alert and oriented to person, place, and time. No cranial nerve deficit. Coordination normal.   No focal weakness   Skin: Skin is warm and dry. No rash noted. He is not diaphoretic. No erythema. No pallor.   Psychiatric: He has a normal mood and affect.       Medications  Current Facility-Administered Medications   Medication Dose Route Frequency Provider Last Rate Last Dose   • calcium carbonate (TUMS) chewable tab 500 mg  500 mg Oral Q6HRS PRN Ronni Reyna M.D.       • insulin regular (HUMULIN R) injection 3-14 Units  3-14 Units Subcutaneous 4X/DAY ACHS Jeremy M Gonda, M.D.   Stopped at 09/30/18 2100    And   • glucose 4 g chewable tablet 16 g  16 g Oral Q15 MIN PRN Jeremy M Gonda, M.D.        And   • dextrose 50% (D50W) injection 25 mL  25 mL  Intravenous Q15 MIN PRN Jeremy M Gonda, M.D.   25 mL at 10/01/18 0612   • furosemide (LASIX) tablet 20 mg  20 mg Oral BID DIURETIC Jeremy M Gonda, M.D.   Stopped at 10/01/18 0600   • amiodarone (CORDARONE) tablet 400 mg  400 mg Oral TWICE DAILY Yosvany Goff A.P.N.   Stopped at 10/01/18 0600   • MD Alert...Warfarin per Pharmacy   Other pharmacy to dose Yosvany Goff A.P.N.       • heparin injection 3,200 Units  3,200 Units Intravenous PRN Jeremy M Gonda, M.D.   3,200 Units at 09/29/18 2244    And   • heparin infusion 25,000 units in 500 ml 0.45% nacl   Intravenous Continuous Jeremy M Gonda, M.D. 29 mL/hr at 10/01/18 0252 1,450 Units/hr at 10/01/18 0252   • Pharmacy Consult Request ...Pain Management Review 1 Each  1 Each Other PRN Douglas Cole M.D.       • acetaminophen (TYLENOL) tablet 650 mg  650 mg Oral Q6HRS PRN Douglas Cole M.D.       • HYDROcodone/acetaminophen (NORCO)  MG per tablet 1 Tab  1 Tab Oral Q6HRS PRN Ledy Tejada A.P.N.   1 Tab at 09/28/18 1110   • metoprolol SR (TOPROL XL) tablet 50 mg  50 mg Oral Q DAY Russel Carrera M.D.   Stopped at 10/01/18 0600   • lisinopril (PRINIVIL) tablet 2.5 mg  2.5 mg Oral Q DAY Russel Carrera M.D.   Stopped at 10/01/18 0600   • folic acid (FOLVITE) tablet 1 mg  1 mg Oral DAILY Jeremy M Gonda, M.D.   Stopped at 10/01/18 0600   • chlorproMAZINE (THORAZINE) tablet 25 mg  25 mg Oral 4X/DAY PRN Jeremy M Gonda, M.D.       • insulin glargine (LANTUS) injection 30 Units  30 Units Subcutaneous Q EVENING Jeremy M Gonda, M.D.   Stopped at 09/30/18 1800   • MD Alert...ICU Electrolyte Replacement per Pharmacy   Other pharmacy to dose Jeremy M Gonda, M.D.       • LORazepam (ATIVAN) injection 1-2 mg  1-2 mg Intravenous Q2HRS PRN Aditya Bower M.D.   1 mg at 09/30/18 2127   • hyoscyamine-maalox plus-lidocaine viscous (GI COCKTAIL) oral susp 15 mL  15 mL Oral Q6HRS PRN Russel Carrera M.D.   15 mL at 09/29/18 1831   • aspirin EC (ECOTRIN) tablet 81 mg  81 mg  Oral DAILY Dimas Henderson M.D.   Stopped at 10/01/18 0600   • Respiratory Care per Protocol   Nebulization Continuous RT Galina Taveras, A.P.N.       • ALPRAZolam (XANAX) tablet 0.25 mg  0.25 mg Oral Q6HRS PRN Galina Taveras, A.P.N.   0.25 mg at 09/26/18 2336   • nitroglycerin (NITROSTAT) tablet 0.4 mg  0.4 mg Sublingual Q5 MIN PRN Manuel Gonzalez M.D.   Stopped at 09/26/18 0916   • morphine (pf) 4 mg/ml injection 2-4 mg  2-4 mg Intravenous Q5 MIN PRN Manuel Gonzalez M.D.   2 mg at 09/30/18 0034   • senna-docusate (PERICOLACE or SENOKOT S) 8.6-50 MG per tablet 2 Tab  2 Tab Oral BID Manuel Gonzalez M.D.   Stopped at 10/01/18 0600    And   • polyethylene glycol/lytes (MIRALAX) PACKET 1 Packet  1 Packet Oral QDAY PRROLY Gonzalez M.D.   1 Packet at 09/28/18 1120    And   • magnesium hydroxide (MILK OF MAGNESIA) suspension 30 mL  30 mL Oral QDAY MARTHA Gonzalez M.D.        And   • bisacodyl (DULCOLAX) suppository 10 mg  10 mg Rectal QDAY PRROLY Gonzalez M.D.       • nicotine (NICODERM) 21 MG/24HR 21 mg  21 mg Transdermal Daily-0600 Manuel Gonzalez M.D.   21 mg at 10/01/18 0600    And   • nicotine polacrilex (NICORETTE) 2 MG piece 2 mg  2 mg Oral Q HOUR PRN Manuel Gonzalez M.D.       • promethazine (PHENERGAN) tablet 25 mg  25 mg Oral Q6HRS MARTHA Gonzalez M.D.   25 mg at 09/24/18 2238   • promethazine (PHENERGAN) suppository 25 mg  25 mg Rectal Q6HRS PRROLY Gonzalez M.D.           Fluids    Intake/Output Summary (Last 24 hours) at 10/01/18 0633  Last data filed at 10/01/18 0200   Gross per 24 hour   Intake             1780 ml   Output              950 ml   Net              830 ml       Laboratory  Recent Results (from the past 48 hour(s))   ACCU-CHEK GLUCOSE    Collection Time: 09/29/18  7:20 AM   Result Value Ref Range    Glucose - Accu-Ck 93 65 - 99 mg/dL   ACCU-CHEK GLUCOSE    Collection Time: 09/29/18 12:59 PM   Result Value Ref Range    Glucose - Accu-Ck 223 (H)  65 - 99 mg/dL   APTT    Collection Time: 18  3:00 PM   Result Value Ref Range    APTT 32.2 24.7 - 36.0 sec   PROTHROMBIN TIME    Collection Time: 18  3:00 PM   Result Value Ref Range    PT 14.6 12.0 - 14.6 sec    INR 1.13 0.87 - 1.13   ACCU-CHEK GLUCOSE    Collection Time: 18  4:56 PM   Result Value Ref Range    Glucose - Accu-Ck 220 (H) 65 - 99 mg/dL   ACCU-CHEK GLUCOSE    Collection Time: 18  9:47 PM   Result Value Ref Range    Glucose - Accu-Ck 98 65 - 99 mg/dL   APTT    Collection Time: 18  9:55 PM   Result Value Ref Range    APTT 49.9 (H) 24.7 - 36.0 sec   EKG    Collection Time: 18 10:52 PM   Result Value Ref Range    Report       [facility]    Test Date:  2018  Pt Name:    KIRK FRITZ               Department: [facility]  MRN:        [id number]                      Room:       12  Gender:     Male                         Technician: KING  :        1960                   Requested By:SCOOBY STEIN  Order #:    [id number]                    Reading MD: Dimas Henderson MD    Measurements  Intervals                                Axis  Rate:       71                           P:          24  MT:         234                          QRS:        267  QRSD:       150                          T:          92  QT:         464  QTc:        505    Interpretive Statements  VENTRICULAR-PACED COMPLEXES  NO FURTHER ANALYSIS ATTEMPTED DUE TO PACED RHYTHM  BASELINE WANDER IN LEAD(S) V6  Compared to ECG 2018 17:36:59  Atrial fibrillation no longer present  Right bundle-branch block no longer present      Electronically Signed On 2018 6:42:22 PDT by Dimas Henderson MD     PROTHROMBIN TIME    Collection Time: 18  4:56 AM   Result Value Ref Range    PT 14.8 (H) 12.0 - 14.6 sec    INR 1.14 (H) 0.87 - 1.13   BASIC METABOLIC PANEL    Collection Time: 18  4:56 AM   Result Value Ref Range    Sodium 136 135 - 145 mmol/L    Potassium 3.7 3.6 - 5.5 mmol/L     Chloride 96 96 - 112 mmol/L    Co2 30 20 - 33 mmol/L    Glucose 84 65 - 99 mg/dL    Bun 12 8 - 22 mg/dL    Creatinine 0.57 0.50 - 1.40 mg/dL    Calcium 8.1 (L) 8.5 - 10.5 mg/dL    Anion Gap 10.0 0.0 - 11.9   CBC WITHOUT DIFFERENTIAL    Collection Time: 09/30/18  4:56 AM   Result Value Ref Range    WBC 14.9 (H) 4.8 - 10.8 K/uL    RBC 4.53 (L) 4.70 - 6.10 M/uL    Hemoglobin 13.6 (L) 14.0 - 18.0 g/dL    Hematocrit 40.4 (L) 42.0 - 52.0 %    MCV 89.2 81.4 - 97.8 fL    MCH 30.0 27.0 - 33.0 pg    MCHC 33.7 33.7 - 35.3 g/dL    RDW 45.5 35.9 - 50.0 fL    Platelet Count 247 164 - 446 K/uL    MPV 9.9 9.0 - 12.9 fL   APTT    Collection Time: 09/30/18  4:56 AM   Result Value Ref Range    APTT 75.4 (H) 24.7 - 36.0 sec   ESTIMATED GFR    Collection Time: 09/30/18  4:56 AM   Result Value Ref Range    GFR If African American >60 >60 mL/min/1.73 m 2    GFR If Non African American >60 >60 mL/min/1.73 m 2   ACCU-CHEK GLUCOSE    Collection Time: 09/30/18  5:49 AM   Result Value Ref Range    Glucose - Accu-Ck 74 65 - 99 mg/dL   ACCU-CHEK GLUCOSE    Collection Time: 09/30/18 11:10 AM   Result Value Ref Range    Glucose - Accu-Ck 58 (L) 65 - 99 mg/dL   ACCU-CHEK GLUCOSE    Collection Time: 09/30/18 11:51 AM   Result Value Ref Range    Glucose - Accu-Ck 105 (H) 65 - 99 mg/dL   APTT    Collection Time: 09/30/18 12:09 PM   Result Value Ref Range    APTT 65.1 (H) 24.7 - 36.0 sec   TROPONIN    Collection Time: 09/30/18 12:09 PM   Result Value Ref Range    Troponin I 6.64 (H) 0.00 - 0.04 ng/mL   TROPONIN    Collection Time: 09/30/18  4:33 PM   Result Value Ref Range    Troponin I 5.83 (H) 0.00 - 0.04 ng/mL   ACCU-CHEK GLUCOSE    Collection Time: 09/30/18  5:47 PM   Result Value Ref Range    Glucose - Accu-Ck 157 (H) 65 - 99 mg/dL   ACCU-CHEK GLUCOSE    Collection Time: 09/30/18  8:08 PM   Result Value Ref Range    Glucose - Accu-Ck 123 (H) 65 - 99 mg/dL   PROTHROMBIN TIME    Collection Time: 10/01/18  2:40 AM   Result Value Ref Range    PT 15.6  (H) 12.0 - 14.6 sec    INR 1.23 (H) 0.87 - 1.13   CBC WITH DIFFERENTIAL    Collection Time: 10/01/18  2:40 AM   Result Value Ref Range    WBC 14.8 (H) 4.8 - 10.8 K/uL    RBC 4.07 (L) 4.70 - 6.10 M/uL    Hemoglobin 12.6 (L) 14.0 - 18.0 g/dL    Hematocrit 35.9 (L) 42.0 - 52.0 %    MCV 88.2 81.4 - 97.8 fL    MCH 31.0 27.0 - 33.0 pg    MCHC 35.1 33.7 - 35.3 g/dL    RDW 45.2 35.9 - 50.0 fL    Platelet Count 253 164 - 446 K/uL    MPV 10.5 9.0 - 12.9 fL    Neutrophils-Polys 77.20 (H) 44.00 - 72.00 %    Lymphocytes 11.70 (L) 22.00 - 41.00 %    Monocytes 9.20 0.00 - 13.40 %    Eosinophils 0.60 0.00 - 6.90 %    Basophils 0.40 0.00 - 1.80 %    Immature Granulocytes 0.90 0.00 - 0.90 %    Nucleated RBC 0.00 /100 WBC    Neutrophils (Absolute) 11.44 (H) 1.82 - 7.42 K/uL    Lymphs (Absolute) 1.73 1.00 - 4.80 K/uL    Monos (Absolute) 1.37 (H) 0.00 - 0.85 K/uL    Eos (Absolute) 0.09 0.00 - 0.51 K/uL    Baso (Absolute) 0.06 0.00 - 0.12 K/uL    Immature Granulocytes (abs) 0.13 (H) 0.00 - 0.11 K/uL    NRBC (Absolute) 0.00 K/uL   BASIC METABOLIC PANEL    Collection Time: 10/01/18  2:40 AM   Result Value Ref Range    Sodium 134 (L) 135 - 145 mmol/L    Potassium 4.4 3.6 - 5.5 mmol/L    Chloride 95 (L) 96 - 112 mmol/L    Co2 30 20 - 33 mmol/L    Glucose 58 (L) 65 - 99 mg/dL    Bun 10 8 - 22 mg/dL    Creatinine 0.54 0.50 - 1.40 mg/dL    Calcium 8.4 (L) 8.5 - 10.5 mg/dL    Anion Gap 9.0 0.0 - 11.9   MAGNESIUM    Collection Time: 10/01/18  2:40 AM   Result Value Ref Range    Magnesium 2.0 1.5 - 2.5 mg/dL   ESTIMATED GFR    Collection Time: 10/01/18  2:40 AM   Result Value Ref Range    GFR If African American >60 >60 mL/min/1.73 m 2    GFR If Non African American >60 >60 mL/min/1.73 m 2   ACCU-CHEK GLUCOSE    Collection Time: 10/01/18  5:58 AM   Result Value Ref Range    Glucose - Accu-Ck 45 (L) 65 - 99 mg/dL       Imaging  X-Ray:  I have personally reviewed the images and compared with prior images. and My impression is: The lungs are clear.       Assessment/Plan  * STEMI involving left anterior descending coronary artery (HCC)   Assessment & Plan    S/P angioplasty with thrombectomy of the LAD on 9/24  No stents were placed  Continue aspirin  Continue metoprolol SR, 50 mg daily  Resume statin        Atrial fibrillation with rapid ventricular response (HCC)   Assessment & Plan    Now in sinus rhythm  Decrease amiodarone, 200 mg twice daily  Continue metoprolol SR, 50 mg daily  Optimize potassium and magnesium  Continue full anticoagulation with heparin drip  Start Coumadin        CAD (coronary artery disease)   Assessment & Plan    Multivessel disease  He is not a candidate for CABG at this time  Continue aspirin  Resume statin  Continue metoprolol SR, 50 mg daily  Fully anticoagulated on heparin drip        Acute systolic heart failure (HCC)- (present on admission)   Assessment & Plan    EF 20%  Increase lisinopril, 5 mg daily  ICD placed on 9/26  Start Aldactone, 25 mg daily        Acute-on-chronic kidney injury (HCC)- (present on admission)   Assessment & Plan    Resolved  Avoid nephrotoxins        Type 2 diabetes mellitus (HCC)- (present on admission)   Assessment & Plan    Stop Lantus due to hypoglycemia  Follow blood glucose closely  Continue sliding scale insulin        Complete heart block by electrocardiogram (HCC)   Assessment & Plan    S/P ICD/pacemaker placement on 9/26             VTE:  Coumadin  Ulcer: Not Indicated  Lines: None    I have performed a physical exam and reviewed and updated ROS and Plan today (10/1/2018). In review of yesterday's note (9/30/2018), there are no changes except as documented above.      Discussed patient condition and risk of morbidity and/or mortality with RN, RT, Pharmacy, Charge nurse / hot rounds and QA team.    OK to transfer out of ICU.  [facility] will sign off on transfer.  Please call if you have any questions.    Ronni Reyna MD  Pulmonary and Critical Care Medicine

## 2018-10-01 NOTE — PROGRESS NOTES
30 Units of Lantus is due at this time. RN discussed with Dr Reyna the appropriateness of dose.   Patient got 30 units of Lantus last night and had low food intake this morning which resulted him having sugars of 58. Low sugars were recovered with juice and food. Patient continues to have low intake and he will be NPO tonight for the cath lab tomorrow morning. MD ordered to hold this evening's Lantus dose.

## 2018-10-01 NOTE — TELEPHONE ENCOUNTER
I left a message for the patient regarding having a prior cardiologist for upcoming visit with LOU.

## 2018-10-01 NOTE — PROGRESS NOTES
Cardiology Follow Up Progress Note    Date of Service  10/1/2018    Attending Physician  Willy Oh M.D.    Chief Complaint   Chest pain    HPI  Nisa Ross is a 58 y.o. male admitted 9/24/2018 with with chest pain and evidence of complete heart block.  Angiography revealed complex coronary anatomy with very high-grade LAD lesion, complete chronic occlusion of the right coronary with collateral filling, and high-grade ramus.  Because of his LV dysfunction, he is felt not to be an acute surgical candidate.  The patient had an AICD  placed on September 26.  Consideration was given to complex coronary intervention.  The patient has history of smoking and diabetes.    Interim Events  Overnight: He has had no chest pain or dyspnea.    Review of Systems  Review of Systems   HENT: Negative.    Respiratory: Negative for shortness of breath.    Cardiovascular: Negative for chest pain and leg swelling.   Gastrointestinal: Negative for abdominal distention, abdominal pain and blood in stool.   Neurological: Negative.    Hematological: Does not bruise/bleed easily.   Psychiatric/Behavioral: Negative for agitation.       Vital signs in last 24 hours  Temp:  [36.4 °C (97.5 °F)-37.3 °C (99.2 °F)] 37.3 °C (99.2 °F)  Pulse:  [77-86] 77  Resp:  [0-33] 0    Physical Exam  Physical Exam   Constitutional: He is oriented to person, place, and time. No distress.   HENT:   Head: Normocephalic and atraumatic.   Poor dentition   Eyes: Pupils are equal, round, and reactive to light. No scleral icterus.   Cardiovascular: Normal rate and regular rhythm.    No murmur heard.  Pulmonary/Chest: He is in respiratory distress. He has no wheezes.   No bleeding from pacemaker site.   Musculoskeletal: He exhibits no edema.   Neurological: He is alert and oriented to person, place, and time. No cranial nerve deficit.   Skin: Skin is warm and dry.   Psychiatric: He has a normal mood and affect.       Lab Review  Lab Results   Component  Value Date/Time    WBC 14.8 (H) 10/01/2018 02:40 AM    RBC 4.07 (L) 10/01/2018 02:40 AM    HEMOGLOBIN 12.6 (L) 10/01/2018 02:40 AM    HEMATOCRIT 35.9 (L) 10/01/2018 02:40 AM    MCV 88.2 10/01/2018 02:40 AM    MCH 31.0 10/01/2018 02:40 AM    MCHC 35.1 10/01/2018 02:40 AM    MPV 10.5 10/01/2018 02:40 AM      Lab Results   Component Value Date/Time    SODIUM 134 (L) 10/01/2018 02:40 AM    POTASSIUM 4.4 10/01/2018 02:40 AM    CHLORIDE 95 (L) 10/01/2018 02:40 AM    CO2 30 10/01/2018 02:40 AM    GLUCOSE 58 (L) 10/01/2018 02:40 AM    BUN 10 10/01/2018 02:40 AM    CREATININE 0.54 10/01/2018 02:40 AM      Lab Results   Component Value Date/Time    ASTSGOT 32 09/29/2018 04:58 AM    ALTSGPT 77 (H) 09/29/2018 04:58 AM     Lab Results   Component Value Date/Time    CHOLSTRLTOT 128 09/25/2018 01:28 AM    LDL 85 09/25/2018 01:28 AM    HDL 24 (A) 09/25/2018 01:28 AM    TRIGLYCERIDE 94 09/25/2018 01:28 AM    TROPONINI 5.83 (H) 09/30/2018 04:33 PM         STEMI: Patient admitted with extensive anterior wall MI.  He has complex coronary anatomy as described above.  EF less than 30% by echo.  He has had no chest pain or dyspnea.  Peak troponin 44.  Because of his multivessel disease in the face of diabetes and LV dysfunction, I have asked the cardiovascular surgeons to reassess his risk for CABG.    Ischemic cardia myopathy: Recommend more aggressive medical therapy.  Lisinopril will be increased and he will be started on spironolactone.    Complete heart block: AICD has been placed.    History of cigarette smoking: Smoking cessation was discussed.    History of diabetes:    PAF: Patient went into atrial fibrillation on 9/29 and was started on amiodarone.  The dose will be reduced.    Willy Oh M.D.   Cardiologist, [facility]  [phone]
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
## 2018-10-01 NOTE — PROGRESS NOTES
Received report from KUNAL Levin and assumed care of pt. VSS. Spoke with pt about concerns and safety measurers.  Went over plan of care with pt and answered any questions. Verified lines and ensured patency, drips are set at the correct rate. Safety measurers implemented. Call light and personal belonging within reach. Pt FSBG 45 , dextrous given, then 92, pt making odd comments and saying he is cold even with extra blanket, FSBG checked again, it is 70, dextrous given, will continue to monitor.   2 RN skin check completed.     PT NPO plan is cath lab later today.

## 2018-10-01 NOTE — PROGRESS NOTES
Pt has a bed in 704 bed 2. Called transport, they will be here soon. Gave report over the phone to tele RN.

## 2018-10-01 NOTE — PROGRESS NOTES
Agree with Dr. Hightower.  Treat with medical therapy and re-evaluate in 4-6 weeks.  Too high risk for surgery now.  STS score is 8.3% mortality with a 57% morbidity/mortality.  Reviewed films with Dr. Valentino

## 2018-10-01 NOTE — CARE PLAN
Problem: Safety  Goal: Will remain free from injury    Intervention: Provide assistance with mobility  Patient uses the call light before getting out of the bed. Patient ambulates to the bathroom with assistance and has refused to sit in the chair.       Problem: Bowel/Gastric:  Goal: Will not experience complications related to bowel motility    Intervention: Assess baseline bowel pattern  Patient had a BM today.

## 2018-10-01 NOTE — PROGRESS NOTES
Patient arrived to unit, alert and oriented. Family at bedside, aware that they are unable to spend the night with the patient due to semi private room. Family agreeable and understanding. Patient on room air, respirations even and unlabored. Patient reports that he feels tired, but denies pain or other complaints. Report received from CIC RN, no further questions at this time. 2 RN skin check and heparin gtt verified. Bed low and in locked position, fall risk bracelet intact, bed alarm in place with call light within reach. Will continue to monitor.

## 2018-10-02 ENCOUNTER — APPOINTMENT (OUTPATIENT)
Dept: RADIOLOGY | Facility: MEDICAL CENTER | Age: 58
DRG: 222 | End: 2018-10-02
Attending: INTERNAL MEDICINE
Payer: MEDICARE

## 2018-10-02 LAB
ANION GAP SERPL CALC-SCNC: 8 MMOL/L (ref 0–11.9)
APTT PPP: 49.1 SEC (ref 24.7–36)
APTT PPP: 63.8 SEC (ref 24.7–36)
BASOPHILS # BLD AUTO: 0.4 % (ref 0–1.8)
BASOPHILS # BLD: 0.07 K/UL (ref 0–0.12)
BUN SERPL-MCNC: 11 MG/DL (ref 8–22)
CALCIUM SERPL-MCNC: 8.3 MG/DL (ref 8.5–10.5)
CHLORIDE SERPL-SCNC: 94 MMOL/L (ref 96–112)
CO2 SERPL-SCNC: 31 MMOL/L (ref 20–33)
CREAT SERPL-MCNC: 0.64 MG/DL (ref 0.5–1.4)
EOSINOPHIL # BLD AUTO: 0.1 K/UL (ref 0–0.51)
EOSINOPHIL NFR BLD: 0.6 % (ref 0–6.9)
ERYTHROCYTE [DISTWIDTH] IN BLOOD BY AUTOMATED COUNT: 44.5 FL (ref 35.9–50)
GLUCOSE BLD-MCNC: 103 MG/DL (ref 65–99)
GLUCOSE BLD-MCNC: 123 MG/DL (ref 65–99)
GLUCOSE BLD-MCNC: 135 MG/DL (ref 65–99)
GLUCOSE BLD-MCNC: 96 MG/DL (ref 65–99)
GLUCOSE SERPL-MCNC: 99 MG/DL (ref 65–99)
HCT VFR BLD AUTO: 35.8 % (ref 42–52)
HGB BLD-MCNC: 12.1 G/DL (ref 14–18)
IMM GRANULOCYTES # BLD AUTO: 0.15 K/UL (ref 0–0.11)
IMM GRANULOCYTES NFR BLD AUTO: 0.9 % (ref 0–0.9)
INR PPP: 1.32 (ref 0.87–1.13)
LYMPHOCYTES # BLD AUTO: 1.2 K/UL (ref 1–4.8)
LYMPHOCYTES NFR BLD: 6.9 % (ref 22–41)
MAGNESIUM SERPL-MCNC: 1.8 MG/DL (ref 1.5–2.5)
MCH RBC QN AUTO: 29.7 PG (ref 27–33)
MCHC RBC AUTO-ENTMCNC: 33.8 G/DL (ref 33.7–35.3)
MCV RBC AUTO: 87.7 FL (ref 81.4–97.8)
MONOCYTES # BLD AUTO: 1.61 K/UL (ref 0–0.85)
MONOCYTES NFR BLD AUTO: 9.3 % (ref 0–13.4)
NEUTROPHILS # BLD AUTO: 14.18 K/UL (ref 1.82–7.42)
NEUTROPHILS NFR BLD: 81.9 % (ref 44–72)
NRBC # BLD AUTO: 0 K/UL
NRBC BLD-RTO: 0 /100 WBC
PLATELET # BLD AUTO: 260 K/UL (ref 164–446)
PMV BLD AUTO: 9.8 FL (ref 9–12.9)
POTASSIUM SERPL-SCNC: 3.7 MMOL/L (ref 3.6–5.5)
PROCALCITONIN SERPL-MCNC: 0.45 NG/ML
PROTHROMBIN TIME: 16.5 SEC (ref 12–14.6)
RBC # BLD AUTO: 4.08 M/UL (ref 4.7–6.1)
SODIUM SERPL-SCNC: 133 MMOL/L (ref 135–145)
WBC # BLD AUTO: 17.3 K/UL (ref 4.8–10.8)

## 2018-10-02 PROCEDURE — 700102 HCHG RX REV CODE 250 W/ 637 OVERRIDE(OP): Performed by: INTERNAL MEDICINE

## 2018-10-02 PROCEDURE — 80048 BASIC METABOLIC PNL TOTAL CA: CPT

## 2018-10-02 PROCEDURE — 700102 HCHG RX REV CODE 250 W/ 637 OVERRIDE(OP): Performed by: FAMILY MEDICINE

## 2018-10-02 PROCEDURE — A9270 NON-COVERED ITEM OR SERVICE: HCPCS | Performed by: INTERNAL MEDICINE

## 2018-10-02 PROCEDURE — 85610 PROTHROMBIN TIME: CPT

## 2018-10-02 PROCEDURE — 99233 SBSQ HOSP IP/OBS HIGH 50: CPT | Performed by: INTERNAL MEDICINE

## 2018-10-02 PROCEDURE — 85730 THROMBOPLASTIN TIME PARTIAL: CPT

## 2018-10-02 PROCEDURE — 87070 CULTURE OTHR SPECIMN AEROBIC: CPT

## 2018-10-02 PROCEDURE — 700105 HCHG RX REV CODE 258: Performed by: INTERNAL MEDICINE

## 2018-10-02 PROCEDURE — 36415 COLL VENOUS BLD VENIPUNCTURE: CPT

## 2018-10-02 PROCEDURE — 82962 GLUCOSE BLOOD TEST: CPT

## 2018-10-02 PROCEDURE — 700105 HCHG RX REV CODE 258

## 2018-10-02 PROCEDURE — 770020 HCHG ROOM/CARE - TELE (206)

## 2018-10-02 PROCEDURE — 85025 COMPLETE CBC W/AUTO DIFF WBC: CPT

## 2018-10-02 PROCEDURE — 99232 SBSQ HOSP IP/OBS MODERATE 35: CPT | Mod: 24 | Performed by: INTERNAL MEDICINE

## 2018-10-02 PROCEDURE — 97530 THERAPEUTIC ACTIVITIES: CPT

## 2018-10-02 PROCEDURE — 84145 PROCALCITONIN (PCT): CPT

## 2018-10-02 PROCEDURE — 83735 ASSAY OF MAGNESIUM: CPT

## 2018-10-02 PROCEDURE — 700111 HCHG RX REV CODE 636 W/ 250 OVERRIDE (IP): Performed by: INTERNAL MEDICINE

## 2018-10-02 PROCEDURE — 71045 X-RAY EXAM CHEST 1 VIEW: CPT

## 2018-10-02 PROCEDURE — A9270 NON-COVERED ITEM OR SERVICE: HCPCS | Performed by: FAMILY MEDICINE

## 2018-10-02 RX ORDER — SODIUM CHLORIDE 9 MG/ML
INJECTION, SOLUTION INTRAVENOUS
Status: COMPLETED
Start: 2018-10-02 | End: 2018-10-02

## 2018-10-02 RX ORDER — POTASSIUM CHLORIDE 20 MEQ/1
10 TABLET, EXTENDED RELEASE ORAL DAILY
Status: DISCONTINUED | OUTPATIENT
Start: 2018-10-02 | End: 2018-10-05 | Stop reason: HOSPADM

## 2018-10-02 RX ORDER — DOXYCYCLINE 100 MG/1
100 TABLET ORAL EVERY 12 HOURS
Status: DISCONTINUED | OUTPATIENT
Start: 2018-10-02 | End: 2018-10-05 | Stop reason: HOSPADM

## 2018-10-02 RX ORDER — LOSARTAN POTASSIUM 25 MG/1
25 TABLET ORAL
Status: DISCONTINUED | OUTPATIENT
Start: 2018-10-03 | End: 2018-10-03

## 2018-10-02 RX ORDER — INSULIN GLARGINE 100 [IU]/ML
25 INJECTION, SOLUTION SUBCUTANEOUS EVERY EVENING
Status: DISCONTINUED | OUTPATIENT
Start: 2018-10-02 | End: 2018-10-05 | Stop reason: HOSPADM

## 2018-10-02 RX ORDER — FUROSEMIDE 20 MG/1
20 TABLET ORAL
Status: DISCONTINUED | OUTPATIENT
Start: 2018-10-03 | End: 2018-10-05 | Stop reason: HOSPADM

## 2018-10-02 RX ADMIN — FUROSEMIDE 20 MG: 20 TABLET ORAL at 05:02

## 2018-10-02 RX ADMIN — ASPIRIN 81 MG: 81 TABLET, COATED ORAL at 06:00

## 2018-10-02 RX ADMIN — CEFTRIAXONE SODIUM 2 G: 2 INJECTION, POWDER, FOR SOLUTION INTRAMUSCULAR; INTRAVENOUS at 15:29

## 2018-10-02 RX ADMIN — HEPARIN SODIUM 3200 UNITS: 1000 INJECTION, SOLUTION INTRAVENOUS; SUBCUTANEOUS at 04:55

## 2018-10-02 RX ADMIN — FOLIC ACID 1 MG: 1 TABLET ORAL at 05:03

## 2018-10-02 RX ADMIN — AMIODARONE HYDROCHLORIDE 200 MG: 200 TABLET ORAL at 05:02

## 2018-10-02 RX ADMIN — STANDARDIZED SENNA CONCENTRATE AND DOCUSATE SODIUM 2 TABLET: 8.6; 5 TABLET ORAL at 06:01

## 2018-10-02 RX ADMIN — INSULIN GLARGINE 25 UNITS: 100 INJECTION, SOLUTION SUBCUTANEOUS at 17:50

## 2018-10-02 RX ADMIN — ANTACID TABLETS 500 MG: 500 TABLET, CHEWABLE ORAL at 05:03

## 2018-10-02 RX ADMIN — ATORVASTATIN CALCIUM 80 MG: 80 TABLET, FILM COATED ORAL at 17:49

## 2018-10-02 RX ADMIN — SODIUM CHLORIDE: 9 INJECTION, SOLUTION INTRAVENOUS at 15:30

## 2018-10-02 RX ADMIN — LISINOPRIL 5 MG: 5 TABLET ORAL at 05:02

## 2018-10-02 RX ADMIN — AMIODARONE HYDROCHLORIDE 200 MG: 200 TABLET ORAL at 17:49

## 2018-10-02 RX ADMIN — ANTACID TABLETS 500 MG: 500 TABLET, CHEWABLE ORAL at 15:29

## 2018-10-02 RX ADMIN — DOXYCYCLINE 100 MG: 100 TABLET ORAL at 15:29

## 2018-10-02 RX ADMIN — ACETAMINOPHEN 650 MG: 325 TABLET, FILM COATED ORAL at 20:04

## 2018-10-02 RX ADMIN — WARFARIN SODIUM 5 MG: 5 TABLET ORAL at 17:49

## 2018-10-02 RX ADMIN — INSULIN HUMAN 3 UNITS: 100 INJECTION, SOLUTION PARENTERAL at 20:51

## 2018-10-02 RX ADMIN — METOPROLOL SUCCINATE 50 MG: 25 TABLET, EXTENDED RELEASE ORAL at 06:01

## 2018-10-02 RX ADMIN — SPIRONOLACTONE 25 MG: 25 TABLET ORAL at 06:01

## 2018-10-02 RX ADMIN — POTASSIUM CHLORIDE 10 MEQ: 1500 TABLET, EXTENDED RELEASE ORAL at 12:40

## 2018-10-02 ASSESSMENT — COGNITIVE AND FUNCTIONAL STATUS - GENERAL
TURNING FROM BACK TO SIDE WHILE IN FLAT BAD: UNABLE
MOVING TO AND FROM BED TO CHAIR: UNABLE
MOVING FROM LYING ON BACK TO SITTING ON SIDE OF FLAT BED: UNABLE
SUGGESTED CMS G CODE MODIFIER MOBILITY: CL
STANDING UP FROM CHAIR USING ARMS: A LITTLE
WALKING IN HOSPITAL ROOM: A LITTLE
MOBILITY SCORE: 10
CLIMB 3 TO 5 STEPS WITH RAILING: TOTAL

## 2018-10-02 ASSESSMENT — ENCOUNTER SYMPTOMS
FEVER: 0
CLAUDICATION: 0
AGITATION: 0
HEADACHES: 0
SENSORY CHANGE: 0
ABDOMINAL DISTENTION: 0
WHEEZING: 0
NAUSEA: 0
COUGH: 1
BRUISES/BLEEDS EASILY: 0
WEAKNESS: 0
DEPRESSION: 0
DIZZINESS: 0
SHORTNESS OF BREATH: 0
VOMITING: 0
NERVOUS/ANXIOUS: 0
SPEECH CHANGE: 0
CHILLS: 0
HEARTBURN: 0
BLURRED VISION: 0
INSOMNIA: 0
SORE THROAT: 0
MYALGIAS: 0
CONSTIPATION: 0
NEUROLOGICAL NEGATIVE: 1
ABDOMINAL PAIN: 0
PHOTOPHOBIA: 0
DIARRHEA: 0
BLOOD IN STOOL: 0

## 2018-10-02 ASSESSMENT — GAIT ASSESSMENTS
DISTANCE (FEET): 5
ASSISTIVE DEVICE: FRONT WHEEL WALKER
GAIT LEVEL OF ASSIST: CONTACT GUARD ASSIST

## 2018-10-02 ASSESSMENT — PAIN SCALES - GENERAL
PAINLEVEL_OUTOF10: 3
PAINLEVEL_OUTOF10: 0

## 2018-10-02 NOTE — CARE PLAN
Problem: Communication  Goal: The ability to communicate needs accurately and effectively will improve    Intervention: [place] patient and significant other/support system to call light to alert staff of needs  Educated Pt regarding the use of the call light to contact CNA and RN for any needs. Pt verbalized understanding.

## 2018-10-02 NOTE — PROGRESS NOTES
Rapid Response Team Rounding Note    Shift#:     Patient Name: Nisa Ross  Sex: Male  Age: 58 y.o.  Admitted: 2018  Room Number: T704/02  Code Status: Full Code    Cause of Rapid Response    ICU Transfer    Diagnosis  STEMI (ST elevation myocardial infarction) (HCC)  STEMI (ST elevation myocardial infarction) (HCC)  STEMI (ST elevation myocardial infarction) (HCC)  STEMI (ST elevation myocardial infarction) (HCC)  STEMI (ST elevation myocardial infarction) (HCC)    Allergies  No Known Allergies    History  Past Medical History:   Diagnosis Date   • Acute coronary syndrome ([facility]) 2018   • Complete heart block by electrocardiogram ([facility]) 2018   • Diabetes     type II   • DM (diabetes mellitus) ([facility]) 2018   • HTN (hypertension), malignant 2018   • Rheumatic fever     1974   • Smoking 2018   • STEMI (ST elevation myocardial infarction) ([facility]) 2018       Vital Signs    Temp (24hrs), Av.9 °C (98.5 °F), Min:36.7 °C (98.1 °F), Max:37.3 °C (99.2 °F)    Pulse Readings from Last 1 Encounters:   10/01/18 74     Systolic (24hrs), Av , Min:101 , Max:101     Diastolic (24hrs), Av, Min:61, Max:61    @LASTSAO2(1)@    Labs    Lab Results   Component Value Date/Time    WBC 14.8 (H) 10/01/2018 02:40 AM    RBC 4.07 (L) 10/01/2018 02:40 AM    HEMOGLOBIN 12.6 (L) 10/01/2018 02:40 AM    HEMATOCRIT 35.9 (L) 10/01/2018 02:40 AM    MCV 88.2 10/01/2018 02:40 AM    MCH 31.0 10/01/2018 02:40 AM    MCHC 35.1 10/01/2018 02:40 AM    MPV 10.5 10/01/2018 02:40 AM      Lab Results   Component Value Date/Time    SODIUM 134 (L) 10/01/2018 02:40 AM    POTASSIUM 4.4 10/01/2018 02:40 AM    CHLORIDE 95 (L) 10/01/2018 02:40 AM    CO2 30 10/01/2018 02:40 AM    GLUCOSE 58 (L) 10/01/2018 02:40 AM    BUN 10 10/01/2018 02:40 AM    CREATININE 0.54 10/01/2018 02:40 AM     Blood Culture   Date Value Ref Range Status   2018 No growth after 5 days of incubation.  Final   2018 No growth after 5 days  of incubation.  Final        Notes    · Episodes of angina recently  · Patient is anxious about bowel movements; however, he has had one recently.   · No further RN Concerns

## 2018-10-02 NOTE — PROGRESS NOTES
Bedside report received, pt care assumed, tele box on.  Pt is alert and oriented. Pt was toileted and repositioned and denies any additional needs at this time. Bed in lowest position, bed alarm on pt educated on fall risk and verbalized understanding, call light within reach.

## 2018-10-02 NOTE — PROGRESS NOTES
Inpatient Anticoagulation Service Note    Date: 10/2/2018  Reason for Anticoagulation: Atrial Fibrillation   [id number] VASc Score: 3    Hemoglobin Value: (!) 12.1  Hematocrit Value: (!) 35.8  Lab Platelet Value: 260  Target INR: 2.0 to 3.0    INR from last 7 days     Date/Time INR Value    10/02/18 0414 (!)  1.32    10/01/18 0240 (!)  1.23    18 0456 (!)  1.14    18 1500  1.13        Dose from last 7 days     Date/Time Dose (mg)    10/02/18 1334  5    10/01/18 1200  5    18 1600  0    18 1600  5        Significant Interactions: Amiodarone, Aspirin, Statin  Bridge Therapy: No - not needed per MD during rounds on 10/2  Reversal Agent Administered: Not Applicable    Comments: Heparin drip was stopped on 10/2 - per MD no need for both heparin and warfarin. H/H stable with no bleeding noted in the chart. Possible discharge noted for 10/3.    Plan: Continue warfarin 5 mg daily. INR in the AM  Education Material Provided?: No (Previously provided by pharmacy)  Pharmacist suggested discharge dosin mg daily with frequent INRs (within 48 hours of discharge and at least twice weekly until stable) due to the interaction with amiodarone which may take a few days to manifest (amiodarone started on ).     Kerry Ashby, PharmD, BCPS      ADDENDUM:  Agree with plan as outlined above.      Debbi Amaya, ConstantinoD

## 2018-10-02 NOTE — PROGRESS NOTES
Hospital Medicine Daily Progress Note    Date of Service  10/2/2018    Chief Complaint  58 y.o. male admitted 9/24/2018 with chest pain and found to be in complete heart block at OSH and transferred to [facility] for further intervention.    Hospital Course    Patient had emergent heart cath and found to have significant multivessel disease, he had throbectomy, angioplasty with balloon, IABP and temp pacer placed on 9/24/18.  He was seen by cardiac surgery and felt to be too frail to proceed with bypass and maximal medical mangement recommended.  He was seen by Dr Cole and had AICD placed on 9/26 and tolerated well.  He also had complication of DKA which has resolved.      Interval Problem Update  Patient transitioned out of ICU and hospitalist services requested by cardiology - D/w Dr Oh.  Patient with persistent hiccups which thorazine has help minimally.  Lantus dose resumed at a lower dose - was previously discontinued because of hypoglycemia.   Patient states he has stopped smoking before but thinks the nicotine patch is contributing to his cough.  WBC is trending back up and patient is at risk to develop secondary pneumonia with his recent deterioration, will check CXR and if PNA cannot be ruled out, will empirically start treatment for HCAP.    Consultants/Specialty  Cardiology - [place]    Code Status  full    Disposition  tbd    Review of Systems  Review of Systems   Constitutional: Negative for chills and fever.   HENT: Negative for congestion and sore throat.    Eyes: Negative for blurred vision and photophobia.   Respiratory: Positive for cough. Negative for shortness of breath and wheezing.    Cardiovascular: Negative for chest pain, claudication and leg swelling.   Gastrointestinal: Negative for abdominal pain, constipation, diarrhea, heartburn, nausea and vomiting.   Genitourinary: Negative for dysuria and hematuria.   Musculoskeletal: Negative for joint pain and myalgias.   Skin: Negative for itching and  rash.   Neurological: Negative for dizziness, sensory change, speech change, weakness and headaches.   Psychiatric/Behavioral: Negative for depression. The patient is not nervous/anxious and does not have insomnia.         Physical Exam  Temp:  [36.2 °C (97.1 °F)-37.6 °C (99.6 °F)] 37.3 °C (99.2 °F)  Pulse:  [] 69  Resp:  [14-20] 20  BP: (100-123)/(49-73) 123/73    Physical Exam   Constitutional: He is oriented to person, place, and time. He appears well-developed and well-nourished. No distress.   HENT:   Head: Normocephalic and atraumatic.   Eyes: Conjunctivae are normal. No scleral icterus.   Neck: Neck supple. No JVD present.   Cardiovascular: Normal rate, regular rhythm and normal heart sounds.  Exam reveals no gallop and no friction rub.    No murmur heard.  Pulmonary/Chest: Effort normal and breath sounds normal. No respiratory distress. He has no wheezes. He exhibits no tenderness.   AICD bandage left chest - CDI   Abdominal: Soft. Bowel sounds are normal. He exhibits no distension and no mass. There is no tenderness.   Musculoskeletal: He exhibits no edema or tenderness.   Neurological: He is alert and oriented to person, place, and time. No cranial nerve deficit.   Skin: Skin is warm and dry. He is not diaphoretic. No erythema. No pallor.   Psychiatric: He has a normal mood and affect. His behavior is normal.   Nursing note and vitals reviewed.      Fluids    Intake/Output Summary (Last 24 hours) at 10/02/18 1353  Last data filed at 10/02/18 1120   Gross per 24 hour   Intake              630 ml   Output             1650 ml   Net            -1020 ml       Laboratory  Recent Labs      09/30/18   0456  10/01/18   0240  10/02/18   0414   WBC  14.9*  14.8*  17.3*   RBC  4.53*  4.07*  4.08*   HEMOGLOBIN  13.6*  12.6*  12.1*   HEMATOCRIT  40.4*  35.9*  35.8*   MCV  89.2  88.2  87.7   MCH  30.0  31.0  29.7   MCHC  33.7  35.1  33.8   RDW  45.5  45.2  44.5   PLATELETCT  247  253  260   MPV  9.9  10.5  9.8      Recent Labs      09/30/18   0456  10/01/18   0240  10/02/18   0414   SODIUM  136  134*  133*   POTASSIUM  3.7  4.4  3.7   CHLORIDE  96  95*  94*   CO2  30  30  31   GLUCOSE  84  58*  99   BUN  12  10  11   CREATININE  0.57  0.54  0.64   CALCIUM  8.1*  8.4*  8.3*     Recent Labs      09/30/18   0456  09/30/18   1209  10/01/18   0240  10/02/18   0414  10/02/18   1052   APTT  75.4*  65.1*   --   49.1*  63.8*   INR  1.14*   --   1.23*  1.32*   --                Imaging  DX-CHEST-PORTABLE (1 VIEW)   Final Result      1.  No acute cardiac or pulmonary abnormalities are identified.   2.  Left-sided pacemaker.      EC-ECHOCARDIOGRAM LTD W/O CONT   Final Result      DX-CHEST-PORTABLE (1 VIEW)   Final Result         1.  There is a left sided pacemaker in good position with its distal leads overlying the right atrium and right ventricle.      2.  No evidence of pneumothorax.      EC-ECHOCARDIOGRAM LTD W/O CONT   Final Result      DX-CHEST-PORTABLE (1 VIEW)   Final Result      Bibasilar atelectasis.      Temporary transvenous pacemaker in place.      Apparent interval removal of intra-aortic balloon pump.      US-ABDOMEN COMPLETE SURVEY   Final Result      1.  Limited evaluation of the pancreas and bladder.   2.  Sludge present in the gallbladder.   3.  No findings to indicate acute cholecystitis or biliary obstruction.   4.  Intra-aortic catheter noted consistent with known balloon pump.            EC-ECHOCARDIOGRAM COMPLETE W/O CONT   Final Result      DX-CHEST-PORTABLE (1 VIEW)   Final Result      1.  No acute cardiac or pulmonary abnormalities are identified.   2.  IABP in place      DX-CHEST-PORTABLE (1 VIEW)   Final Result      No acute cardiac or pulmonary abnormalities are identified.      US-CAROTID DOPPLER BILAT   Final Result      US-EXTREMITY VENOUS LOWER BILAT   Final Result      DX-CHEST-LIMITED (1 VIEW)   Final Result      No acute cardiopulmonary disease.           Assessment/Plan  * STEMI involving left  anterior descending coronary artery (HCC)   Assessment & Plan    Acute anterior MI.  Status post cardiac cath that showed multi-vessel severe coronary artery disease.  CABG recommended but medical management for 4-6 weeks before considering intervention.  Warfarin titration  Cardiology following.  Bb, Asa, statin, ARB        Atrial fibrillation with rapid ventricular response (HCC)   Assessment & Plan    Following MI  Responding well to amiodarone and bb  Cardiology consulting  Warfarin, INR 1.32          Cardiogenic shock (HCC)   Assessment & Plan    Resolved  Out of ICU         Acute systolic heart failure (HCC)- (present on admission)   Assessment & Plan    Severe secondary to acute extensive anterior MI.  Ejection fraction estimated to be 20%  CABG recommended following recovery  Bb, ARB, diuresis  AICD placed 9/26 - Dr Cole        Leukocytosis- (present on admission)   Assessment & Plan    UA was negative for UTI.  Chest x-ray normal on admission, recheck in light of continued productive cough  If PNA cannot be ruled out with CXR, will start empiric treatment for HCAP.        Acute-on-chronic kidney injury (HCC)- (present on admission)   Assessment & Plan    Cr has returned to normal  Avoid nephrotoxins.  Renal dose all medications.        Type 2 diabetes mellitus (HCC)- (present on admission)   Assessment & Plan    DKA resolved  Lantus dose titration, SSI, Diabetic diet          Smoking- (present on admission)   Assessment & Plan    Counseling provided.  Nicotine patch available as needed.        Complete heart block by electrocardiogram ([facility])   Assessment & Plan    S/p AICD placement               VTE prophylaxis: warfarin, scds

## 2018-10-02 NOTE — PROGRESS NOTES
Handoff report received. Assumed patient care. Patient sitting up in bed.   Patient not in distress, AAOX 4.  Safety precautions in place. Call light and personal belongings within reach. Educated to call for assistance if needed.

## 2018-10-02 NOTE — CARE PLAN
Problem: Infection  Goal: Will remain free from infection    Intervention: Implement standard precautions and perform hand washing before and after patient contact  Appropriate protocols and standards utilized to minimize likelihood of infection and the spread of infection. Proper hand hygiene utilized and pt and family members educated on hand hygiene.       Problem: Knowledge Deficit  Goal: Knowledge of disease process/condition, treatment plan, diagnostic tests, and medications will improve    Intervention: Explain information regarding disease process/condition, treatment plan, diagnostic tests, and medications and document in education  Pt educated on POC for the day, disease process, upcoming tests, and medication information. Will continue to answer questions and educate pt as necessary.

## 2018-10-02 NOTE — PROGRESS NOTES
Cardiology Follow Up Progress Note    Date of Service  10/2/2018    Attending Physician  Tila Pérez D.O.    Chief Complaint   Chest pain    HPI  Nisa Ross is a 58 y.o. male admitted 9/24/2018 with with chest pain and complete heart block.  He has complex coronary disease by angiography.  He is been seen by surgery and felt to be very high risk  For CABG.  AICD was placed for primary prevention and for his heart block.  Overnight he complains of hiccups and cough.  His hiccups do not correlate to his pacing spikes and therefore this is not secondary to diaphragmatic stimulation.    He has a cough which may be secondary to ACE inhibitors.  Lisinopril will be discontinued and be started on an ARB.  Review of Systems  Review of Systems   HENT: Negative.    Respiratory: Positive for cough. Negative for shortness of breath.    Cardiovascular: Negative for chest pain and leg swelling.   Gastrointestinal: Negative for abdominal distention, abdominal pain and blood in stool.        Complains of hiccups   Skin: Negative.    Neurological: Negative.    Hematological: Does not bruise/bleed easily.   Psychiatric/Behavioral: Negative for agitation.       Vital signs in last 24 hours  Temp:  [36.2 °C (97.1 °F)-37.6 °C (99.6 °F)] 37.6 °C (99.6 °F)  Pulse:  [] 98  Resp:  [14-18] 14  BP: (100-110)/(49-65) 107/65    Physical Exam  Physical Exam   Constitutional: He is oriented to person, place, and time. No distress.   HENT:   Head: Normocephalic and atraumatic.   Poor dentition   Eyes: Pupils are equal, round, and reactive to light. No scleral icterus.   Cardiovascular: Normal rate and regular rhythm.    No murmur heard.  Pulmonary/Chest: Breath sounds normal. No respiratory distress. He has no wheezes.   Pacer site without hematoma.  The patient's hiccups do not correlate at all with his pacing, therefore this is not from diaphragmatic stimulation.   Musculoskeletal: He exhibits no edema.   Neurological: He is  alert and oriented to person, place, and time. No cranial nerve deficit.   Skin: Skin is warm and dry.   Psychiatric: He has a normal mood and affect.       Lab Review  Lab Results   Component Value Date/Time    WBC 17.3 (H) 10/02/2018 04:14 AM    RBC 4.08 (L) 10/02/2018 04:14 AM    HEMOGLOBIN 12.1 (L) 10/02/2018 04:14 AM    HEMATOCRIT 35.8 (L) 10/02/2018 04:14 AM    MCV 87.7 10/02/2018 04:14 AM    MCH 29.7 10/02/2018 04:14 AM    MCHC 33.8 10/02/2018 04:14 AM    MPV 9.8 10/02/2018 04:14 AM      Lab Results   Component Value Date/Time    SODIUM 133 (L) 10/02/2018 04:14 AM    POTASSIUM 3.7 10/02/2018 04:14 AM    CHLORIDE 94 (L) 10/02/2018 04:14 AM    CO2 31 10/02/2018 04:14 AM    GLUCOSE 99 10/02/2018 04:14 AM    BUN 11 10/02/2018 04:14 AM    CREATININE 0.64 10/02/2018 04:14 AM      Lab Results   Component Value Date/Time    ASTSGOT 32 09/29/2018 04:58 AM    ALTSGPT 77 (H) 09/29/2018 04:58 AM     Lab Results   Component Value Date/Time    CHOLSTRLTOT 128 09/25/2018 01:28 AM    LDL 85 09/25/2018 01:28 AM    HDL 24 (A) 09/25/2018 01:28 AM    TRIGLYCERIDE 94 09/25/2018 01:28 AM    TROPONINI 5.83 (H) 09/30/2018 04:33 PM             Status post STEMI: Evidence of an extensive anterior wall MI by EKG and complex coronary stenosis by cath.  EF is severely reduced.  No evidence of volume overload on exam we will increase activity.  He will be changed from an ACE inhibitor to an ARB as discussed above.  Increase activity today.    His furosemide dose will be decreased to once daily and he will be given potassium supplementation.  Check BNP in the morning.    PAF: No recurrence on amiodarone.    Consider discharge tomorrow if he is able to ambulate without difficulty.    Willy Oh M.D.   Cardiologist, [facility]  [phone]

## 2018-10-02 NOTE — CARE PLAN
Problem: Safety  Goal: Will remain free from falls    Intervention: Implement fall precautions  Fall precautions in place. Treaded socks on pt. Appropriate signs on doorway. IV pole on same side as bathroom. Bedrails up. Bed in lowest position and locked.  Call light and phone within reach. Patient educated on importance of calling nurses before getting out of bed, verbalizes understanding. Bed alarm is on.

## 2018-10-03 LAB
ANION GAP SERPL CALC-SCNC: 11 MMOL/L (ref 0–11.9)
APPEARANCE UR: CLEAR
BASOPHILS # BLD AUTO: 0.4 % (ref 0–1.8)
BASOPHILS # BLD: 0.07 K/UL (ref 0–0.12)
BILIRUB UR QL STRIP.AUTO: NEGATIVE
BNP SERPL-MCNC: 776 PG/ML (ref 0–100)
BUN SERPL-MCNC: 12 MG/DL (ref 8–22)
CALCIUM SERPL-MCNC: 8.5 MG/DL (ref 8.5–10.5)
CHLORIDE SERPL-SCNC: 94 MMOL/L (ref 96–112)
CO2 SERPL-SCNC: 29 MMOL/L (ref 20–33)
COLOR UR: ABNORMAL
CREAT SERPL-MCNC: 0.61 MG/DL (ref 0.5–1.4)
EOSINOPHIL # BLD AUTO: 0.14 K/UL (ref 0–0.51)
EOSINOPHIL NFR BLD: 0.7 % (ref 0–6.9)
ERYTHROCYTE [DISTWIDTH] IN BLOOD BY AUTOMATED COUNT: 46.4 FL (ref 35.9–50)
GLUCOSE BLD-MCNC: 105 MG/DL (ref 65–99)
GLUCOSE BLD-MCNC: 165 MG/DL (ref 65–99)
GLUCOSE BLD-MCNC: 167 MG/DL (ref 65–99)
GLUCOSE BLD-MCNC: 185 MG/DL (ref 65–99)
GLUCOSE BLD-MCNC: 217 MG/DL (ref 65–99)
GLUCOSE SERPL-MCNC: 140 MG/DL (ref 65–99)
GLUCOSE UR STRIP.AUTO-MCNC: NEGATIVE MG/DL
HCT VFR BLD AUTO: 38.1 % (ref 42–52)
HGB BLD-MCNC: 13.2 G/DL (ref 14–18)
IMM GRANULOCYTES # BLD AUTO: 0.12 K/UL (ref 0–0.11)
IMM GRANULOCYTES NFR BLD AUTO: 0.6 % (ref 0–0.9)
INR PPP: 1.32 (ref 0.87–1.13)
KETONES UR STRIP.AUTO-MCNC: NEGATIVE MG/DL
LEUKOCYTE ESTERASE UR QL STRIP.AUTO: NEGATIVE
LYMPHOCYTES # BLD AUTO: 1.32 K/UL (ref 1–4.8)
LYMPHOCYTES NFR BLD: 6.9 % (ref 22–41)
MAGNESIUM SERPL-MCNC: 1.9 MG/DL (ref 1.5–2.5)
MCH RBC QN AUTO: 30.3 PG (ref 27–33)
MCHC RBC AUTO-ENTMCNC: 34.6 G/DL (ref 33.7–35.3)
MCV RBC AUTO: 87.4 FL (ref 81.4–97.8)
MICRO URNS: ABNORMAL
MONOCYTES # BLD AUTO: 1.63 K/UL (ref 0–0.85)
MONOCYTES NFR BLD AUTO: 8.5 % (ref 0–13.4)
NEUTROPHILS # BLD AUTO: 15.92 K/UL (ref 1.82–7.42)
NEUTROPHILS NFR BLD: 82.9 % (ref 44–72)
NITRITE UR QL STRIP.AUTO: NEGATIVE
NRBC # BLD AUTO: 0 K/UL
NRBC BLD-RTO: 0 /100 WBC
PH UR STRIP.AUTO: 8.5 [PH]
PLATELET # BLD AUTO: 267 K/UL (ref 164–446)
PMV BLD AUTO: 9.5 FL (ref 9–12.9)
POTASSIUM SERPL-SCNC: 4.1 MMOL/L (ref 3.6–5.5)
PROT UR QL STRIP: NEGATIVE MG/DL
PROTHROMBIN TIME: 16.5 SEC (ref 12–14.6)
RBC # BLD AUTO: 4.36 M/UL (ref 4.7–6.1)
RBC UR QL AUTO: NEGATIVE
SODIUM SERPL-SCNC: 134 MMOL/L (ref 135–145)
SP GR UR STRIP.AUTO: 1.01
UROBILINOGEN UR STRIP.AUTO-MCNC: >=8 MG/DL
WBC # BLD AUTO: 19.2 K/UL (ref 4.8–10.8)

## 2018-10-03 PROCEDURE — 770020 HCHG ROOM/CARE - TELE (206)

## 2018-10-03 PROCEDURE — 700111 HCHG RX REV CODE 636 W/ 250 OVERRIDE (IP): Performed by: INTERNAL MEDICINE

## 2018-10-03 PROCEDURE — A9270 NON-COVERED ITEM OR SERVICE: HCPCS | Performed by: INTERNAL MEDICINE

## 2018-10-03 PROCEDURE — 83880 ASSAY OF NATRIURETIC PEPTIDE: CPT

## 2018-10-03 PROCEDURE — 80048 BASIC METABOLIC PNL TOTAL CA: CPT

## 2018-10-03 PROCEDURE — 700102 HCHG RX REV CODE 250 W/ 637 OVERRIDE(OP): Performed by: FAMILY MEDICINE

## 2018-10-03 PROCEDURE — A9270 NON-COVERED ITEM OR SERVICE: HCPCS | Performed by: FAMILY MEDICINE

## 2018-10-03 PROCEDURE — 700102 HCHG RX REV CODE 250 W/ 637 OVERRIDE(OP): Performed by: INTERNAL MEDICINE

## 2018-10-03 PROCEDURE — 99233 SBSQ HOSP IP/OBS HIGH 50: CPT | Mod: 24 | Performed by: INTERNAL MEDICINE

## 2018-10-03 PROCEDURE — 85025 COMPLETE CBC W/AUTO DIFF WBC: CPT

## 2018-10-03 PROCEDURE — 36415 COLL VENOUS BLD VENIPUNCTURE: CPT

## 2018-10-03 PROCEDURE — 83735 ASSAY OF MAGNESIUM: CPT

## 2018-10-03 PROCEDURE — 82962 GLUCOSE BLOOD TEST: CPT | Mod: 91

## 2018-10-03 PROCEDURE — 81003 URINALYSIS AUTO W/O SCOPE: CPT

## 2018-10-03 PROCEDURE — 700105 HCHG RX REV CODE 258: Performed by: INTERNAL MEDICINE

## 2018-10-03 PROCEDURE — 85610 PROTHROMBIN TIME: CPT

## 2018-10-03 PROCEDURE — 99233 SBSQ HOSP IP/OBS HIGH 50: CPT | Performed by: INTERNAL MEDICINE

## 2018-10-03 RX ORDER — WARFARIN SODIUM 7.5 MG/1
7.5 TABLET ORAL
Status: DISCONTINUED | OUTPATIENT
Start: 2018-10-03 | End: 2018-10-05 | Stop reason: HOSPADM

## 2018-10-03 RX ORDER — AMIODARONE HYDROCHLORIDE 200 MG/1
200 TABLET ORAL
Status: DISCONTINUED | OUTPATIENT
Start: 2018-10-04 | End: 2018-10-05 | Stop reason: HOSPADM

## 2018-10-03 RX ORDER — LOSARTAN POTASSIUM 50 MG/1
50 TABLET ORAL
Status: DISCONTINUED | OUTPATIENT
Start: 2018-10-04 | End: 2018-10-04

## 2018-10-03 RX ADMIN — DOXYCYCLINE 100 MG: 100 TABLET ORAL at 06:03

## 2018-10-03 RX ADMIN — INSULIN HUMAN 3 UNITS: 100 INJECTION, SOLUTION PARENTERAL at 18:31

## 2018-10-03 RX ADMIN — FUROSEMIDE 20 MG: 20 TABLET ORAL at 06:03

## 2018-10-03 RX ADMIN — METOPROLOL SUCCINATE 50 MG: 25 TABLET, EXTENDED RELEASE ORAL at 06:03

## 2018-10-03 RX ADMIN — DOXYCYCLINE 100 MG: 100 TABLET ORAL at 18:56

## 2018-10-03 RX ADMIN — MAGNESIUM HYDROXIDE 30 ML: 400 SUSPENSION ORAL at 18:55

## 2018-10-03 RX ADMIN — SPIRONOLACTONE 25 MG: 25 TABLET ORAL at 06:03

## 2018-10-03 RX ADMIN — ANTACID TABLETS 500 MG: 500 TABLET, CHEWABLE ORAL at 06:14

## 2018-10-03 RX ADMIN — FOLIC ACID 1 MG: 1 TABLET ORAL at 06:03

## 2018-10-03 RX ADMIN — CEFTRIAXONE SODIUM 2 G: 2 INJECTION, POWDER, FOR SOLUTION INTRAMUSCULAR; INTRAVENOUS at 06:03

## 2018-10-03 RX ADMIN — LOSARTAN POTASSIUM 25 MG: 25 TABLET, FILM COATED ORAL at 06:03

## 2018-10-03 RX ADMIN — STANDARDIZED SENNA CONCENTRATE AND DOCUSATE SODIUM 2 TABLET: 8.6; 5 TABLET ORAL at 18:56

## 2018-10-03 RX ADMIN — INSULIN HUMAN 3 UNITS: 100 INJECTION, SOLUTION PARENTERAL at 12:57

## 2018-10-03 RX ADMIN — INSULIN HUMAN 4 UNITS: 100 INJECTION, SOLUTION PARENTERAL at 20:47

## 2018-10-03 RX ADMIN — AMIODARONE HYDROCHLORIDE 200 MG: 200 TABLET ORAL at 06:03

## 2018-10-03 RX ADMIN — INSULIN GLARGINE 25 UNITS: 100 INJECTION, SOLUTION SUBCUTANEOUS at 18:31

## 2018-10-03 RX ADMIN — POTASSIUM CHLORIDE 10 MEQ: 1500 TABLET, EXTENDED RELEASE ORAL at 06:03

## 2018-10-03 RX ADMIN — WARFARIN SODIUM 7.5 MG: 7.5 TABLET ORAL at 18:55

## 2018-10-03 RX ADMIN — ASPIRIN 81 MG: 81 TABLET, COATED ORAL at 06:03

## 2018-10-03 RX ADMIN — ATORVASTATIN CALCIUM 80 MG: 80 TABLET, FILM COATED ORAL at 18:56

## 2018-10-03 ASSESSMENT — ENCOUNTER SYMPTOMS
PHOTOPHOBIA: 0
NERVOUS/ANXIOUS: 0
ABDOMINAL PAIN: 0
WHEEZING: 0
DEPRESSION: 0
CHILLS: 0
VOMITING: 0
BLURRED VISION: 0
MYALGIAS: 0
SPEECH CHANGE: 0
COUGH: 1
BRUISES/BLEEDS EASILY: 0
WEAKNESS: 0
PALPITATIONS: 0
HEADACHES: 0
CONSTIPATION: 0
DIZZINESS: 0
CLAUDICATION: 0
NEUROLOGICAL NEGATIVE: 1
PSYCHIATRIC NEGATIVE: 1
HEARTBURN: 0
FEVER: 0
DIARRHEA: 0
SENSORY CHANGE: 0
SHORTNESS OF BREATH: 0
NAUSEA: 0
SORE THROAT: 0
INSOMNIA: 0

## 2018-10-03 ASSESSMENT — PAIN SCALES - GENERAL
PAINLEVEL_OUTOF10: 0

## 2018-10-03 NOTE — THERAPY
"Physical Therapy Treatment completed.   Bed Mobility:  Supine to Sit:  Min A for LE management   Transfers: Sit to Stand: Minimal Assist (from bed side commode)  Gait: Level Of Assist: Contact Guard Assist with Front-Wheel Walker   X 5ft     Plan of Care: Will benefit from Physical Therapy 3 times per week in the acute care setting; pt can benefit from increased frequency at next setting.   Discharge Recommendations: Equipment: Will Continue to Assess for Equipment Needs.     Pt with significant fatigue this session; fecal incontinence throughout session; disoriented and disengaged; /68, HR 98, Sp02 93%; continue to recommend placement with close monitoring of progression of physical activity given remaining coronary disease. Will follow.     See \"Rehab Therapy-Acute\" Patient Summary Report for complete documentation.       "

## 2018-10-03 NOTE — PROGRESS NOTES
Hospital Medicine Daily Progress Note    Date of Service  10/3/2018    Chief Complaint  58 y.o. male admitted 9/24/2018 with chest pain and found to be in complete heart block at OSH and transferred to [facility] for further intervention.    Hospital Course    Patient had emergent heart cath and found to have significant multivessel disease, he had throbectomy, angioplasty with balloon, IABP and temp pacer placed on 9/24/18.  He was seen by cardiac surgery and felt to be too frail to proceed with bypass and maximal medical mangement recommended.  He was seen by Dr Cole and had AICD placed on 9/26 and tolerated well.  He also had complication of DKA which has resolved.      Interval Problem Update  10/2 Patient transitioned out of ICU and hospitalist services requested by cardiology - D/w Dr Oh.  Patient with persistent hiccups which thorazine has help minimally.  Lantus dose resumed at a lower dose - was previously discontinued because of hypoglycemia.   Patient states he has stopped smoking before but thinks the nicotine patch is contributing to his cough.  WBC is trending back up and patient is at risk to develop secondary pneumonia with his recent deterioration, will check CXR and if PNA cannot be ruled out, will empirically start treatment for HCAP.  10/3 Patient feeling better today, states cough has improved.  His WBC continues to rise despite initiation of antibiotics. UA rechecked and still no evidence of infection.  Will continue to monitor and continue rocephin/doxy started on 10/2.    Consultants/Specialty  Cardiology Robin Oh    Code Status  full    Disposition  tbd    Review of Systems  Review of Systems   Constitutional: Negative for chills and fever.   HENT: Negative for congestion and sore throat.    Eyes: Negative for blurred vision and photophobia.   Respiratory: Positive for cough. Negative for shortness of breath and wheezing.    Cardiovascular: Negative for chest pain, claudication and leg  swelling.   Gastrointestinal: Negative for abdominal pain, constipation, diarrhea, heartburn, nausea and vomiting.   Genitourinary: Negative for dysuria and hematuria.   Musculoskeletal: Negative for joint pain and myalgias.   Skin: Negative for itching and rash.   Neurological: Negative for dizziness, sensory change, speech change, weakness and headaches.   Psychiatric/Behavioral: Negative for depression. The patient is not nervous/anxious and does not have insomnia.         Physical Exam  Temp:  [36.4 °C (97.6 °F)-37.6 °C (99.7 °F)] 36.9 °C (98.5 °F)  Pulse:  [64-97] 89  Resp:  [16-26] 16  BP: (105-135)/(61-98) 110/70    Physical Exam   Constitutional: He is oriented to person, place, and time. He appears well-developed and well-nourished. No distress.   HENT:   Head: Normocephalic and atraumatic.   Eyes: Conjunctivae are normal. No scleral icterus.   Neck: Neck supple. No JVD present.   Cardiovascular: Normal rate, regular rhythm and normal heart sounds.  Exam reveals no gallop and no friction rub.    No murmur heard.  Pulmonary/Chest: Effort normal and breath sounds normal. No respiratory distress. He has no wheezes. He exhibits no tenderness.   AICD bandage left chest - CDI   Abdominal: Soft. Bowel sounds are normal. He exhibits no distension and no mass. There is no tenderness.   Musculoskeletal: He exhibits no edema or tenderness.   Neurological: He is alert and oriented to person, place, and time. No cranial nerve deficit.   Skin: Skin is warm and dry. He is not diaphoretic. No erythema. No pallor.   Psychiatric: He has a normal mood and affect. His behavior is normal.   Nursing note and vitals reviewed.      Fluids    Intake/Output Summary (Last 24 hours) at 10/03/18 1333  Last data filed at 10/03/18 0715   Gross per 24 hour   Intake              570 ml   Output             1650 ml   Net            -1080 ml       Laboratory  Recent Labs      10/01/18   0240  10/02/18   0414  10/03/18   0517   WBC  14.8*   17.3*  19.2*   RBC  4.07*  4.08*  4.36*   HEMOGLOBIN  12.6*  12.1*  13.2*   HEMATOCRIT  35.9*  35.8*  38.1*   MCV  88.2  87.7  87.4   MCH  31.0  29.7  30.3   MCHC  35.1  33.8  34.6   RDW  45.2  44.5  46.4   PLATELETCT  253  260  267   MPV  10.5  9.8  9.5     Recent Labs      10/01/18   0240  10/02/18   0414  10/03/18   0246   SODIUM  134*  133*  134*   POTASSIUM  4.4  3.7  4.1   CHLORIDE  95*  94*  94*   CO2  30  31  29   GLUCOSE  58*  99  140*   BUN  10  11  12   CREATININE  0.54  0.64  0.61   CALCIUM  8.4*  8.3*  8.5     Recent Labs      10/01/18   0240  10/02/18   0414  10/02/18   1052  10/03/18   0245   APTT   --   49.1*  63.8*   --    INR  1.23*  1.32*   --   1.32*     Recent Labs      10/03/18   0245   BNPBTYPENAT  776*           Imaging  DX-CHEST-PORTABLE (1 VIEW)   Final Result      Stable cardiomegaly.      Atherosclerotic plaque.      DX-CHEST-PORTABLE (1 VIEW)   Final Result      1.  No acute cardiac or pulmonary abnormalities are identified.   2.  Left-sided pacemaker.      EC-ECHOCARDIOGRAM LTD W/O CONT   Final Result      DX-CHEST-PORTABLE (1 VIEW)   Final Result         1.  There is a left sided pacemaker in good position with its distal leads overlying the right atrium and right ventricle.      2.  No evidence of pneumothorax.      EC-ECHOCARDIOGRAM LTD W/O CONT   Final Result      DX-CHEST-PORTABLE (1 VIEW)   Final Result      Bibasilar atelectasis.      Temporary transvenous pacemaker in place.      Apparent interval removal of intra-aortic balloon pump.      US-ABDOMEN COMPLETE SURVEY   Final Result      1.  Limited evaluation of the pancreas and bladder.   2.  Sludge present in the gallbladder.   3.  No findings to indicate acute cholecystitis or biliary obstruction.   4.  Intra-aortic catheter noted consistent with known balloon pump.            EC-ECHOCARDIOGRAM COMPLETE W/O CONT   Final Result      DX-CHEST-PORTABLE (1 VIEW)   Final Result      1.  No acute cardiac or pulmonary abnormalities  are identified.   2.  IABP in place      DX-CHEST-PORTABLE (1 VIEW)   Final Result      No acute cardiac or pulmonary abnormalities are identified.      US-CAROTID DOPPLER BILAT   Final Result      US-EXTREMITY VENOUS LOWER BILAT   Final Result      DX-CHEST-LIMITED (1 VIEW)   Final Result      No acute cardiopulmonary disease.           Assessment/Plan  * STEMI involving left anterior descending coronary artery (HCC)   Assessment & Plan    Acute anterior MI.  Status post cardiac cath that showed multi-vessel severe coronary artery disease.  CABG recommended but medical management for 4-6 weeks before considering intervention.  Warfarin titration  Cardiology following.  Bb, Asa, statin, ARB        Atrial fibrillation with rapid ventricular response (HCC)   Assessment & Plan    Following MI  Responding well to amiodarone and bb  Cardiology consulting  Warfarin, INR 1.32          Cardiogenic shock (HCC)   Assessment & Plan    Resolved  Out of ICU         Acute systolic heart failure (HCC)- (present on admission)   Assessment & Plan    Severe secondary to acute extensive anterior MI.  Ejection fraction estimated to be 20%  CABG recommended following recovery  Bb, ARB, diuresis  AICD placed 9/26 - Dr Cole        Leukocytosis- (present on admission)   Assessment & Plan    Continues to increase following initiation of rocephin/doxycycline  Repeat UA is negative for UTI.  empiric treatment for HCAP.  Patient appears clinicially better        Acute-on-chronic kidney injury (HCC)- (present on admission)   Assessment & Plan    Cr has returned to normal  Avoid nephrotoxins.  Renal dose all medications.        Type 2 diabetes mellitus (HCC)- (present on admission)   Assessment & Plan    DKA resolved  Lantus dose titration, SSI, Diabetic diet          Smoking- (present on admission)   Assessment & Plan    Counseling provided.  Nicotine patch available as needed.        Complete heart block by electrocardiogram (HCC)   Assessment  & Plan    S/p AICD placement               VTE prophylaxis: warfarin, scds      Quality-Core Measures

## 2018-10-03 NOTE — PROGRESS NOTES
Cardiology Follow Up Progress Note    Date of Service  10/3/2018    Attending Physician  Tila Pérez D.O.    Chief Complaint   Chest pain    HPI  Patient admitted with chest pain, complete heart block and evidence of STEMI.  Angiography revealed severe multivessel disease.  Severe LV dysfunction was noted by echo with EF less than 20%.  He was evaluated weighted by surgery and felt to be a very high risk for CABG.  Permanent pacemaker was placed.  Overnight, he is felt better.  His hiccups have resolved.  No chest pain or dyspnea.  His white blood cell count has increased steadily from the 29th and is now 19.2 thousand.  Patient has had no cough.    Review of Systems  Review of Systems   Constitutional: Negative for fever.   HENT: Negative.    Respiratory: Negative for shortness of breath.    Cardiovascular: Positive for chest pain. Negative for palpitations and leg swelling.   Neurological: Negative.    Hematological: Does not bruise/bleed easily.   Psychiatric/Behavioral: Negative.        Vital signs in last 24 hours  Temp:  [36.4 °C (97.6 °F)-37.6 °C (99.7 °F)] 37.1 °C (98.7 °F)  Pulse:  [64-97] 96  Resp:  [18-26] 26  BP: (105-135)/(61-98) 110/61    Physical Exam  Physical Exam   Constitutional: He is oriented to person, place, and time. No distress.   Patient is examined today.  Physical examination is unchanged from yesterday.  Lungs remain clear.   HENT:   Head: Normocephalic and atraumatic.   Poor dentition   Eyes: Pupils are equal, round, and reactive to light. No scleral icterus.   Cardiovascular: Normal rate and regular rhythm.    No murmur heard.  Pulmonary/Chest: Breath sounds normal. No respiratory distress. He has no wheezes.   Pacer site without hematoma.     Musculoskeletal: He exhibits no edema.   Neurological: He is alert and oriented to person, place, and time. No cranial nerve deficit.   Skin: Skin is warm and dry.   Psychiatric: He has a normal mood and affect.       Lab Review  Lab Results    Component Value Date/Time    WBC 19.2 (H) 10/03/2018 05:17 AM    RBC 4.36 (L) 10/03/2018 05:17 AM    HEMOGLOBIN 13.2 (L) 10/03/2018 05:17 AM    HEMATOCRIT 38.1 (L) 10/03/2018 05:17 AM    MCV 87.4 10/03/2018 05:17 AM    MCH 30.3 10/03/2018 05:17 AM    MCHC 34.6 10/03/2018 05:17 AM    MPV 9.5 10/03/2018 05:17 AM      Lab Results   Component Value Date/Time    SODIUM 134 (L) 10/03/2018 02:46 AM    POTASSIUM 4.1 10/03/2018 02:46 AM    CHLORIDE 94 (L) 10/03/2018 02:46 AM    CO2 29 10/03/2018 02:46 AM    GLUCOSE 140 (H) 10/03/2018 02:46 AM    BUN 12 10/03/2018 02:46 AM    CREATININE 0.61 10/03/2018 02:46 AM      Lab Results   Component Value Date/Time    ASTSGOT 32 09/29/2018 04:58 AM    ALTSGPT 77 (H) 09/29/2018 04:58 AM     Lab Results   Component Value Date/Time    CHOLSTRLTOT 128 09/25/2018 01:28 AM    LDL 85 09/25/2018 01:28 AM    HDL 24 (A) 09/25/2018 01:28 AM    TRIGLYCERIDE 94 09/25/2018 01:28 AM    TROPONINI 5.83 (H) 09/30/2018 04:33 PM       Recent Labs      10/03/18   0245   BNPBTYPENAT  776*     Acute systolic heart failure.  EF is severely reduced.  He is on appropriate medications and not volume overloaded by exam.    Status post permanent pacemaker: Appropriate function noted.    Status post STEMI: Evidence of extensive anterior wall MI by EKG.  Complex coronary disease by angiography.  He had been seen by surgery and it was felt that he should be waiting.  Approximately 4-6 weeks before reconsidering CABG. he has had no angina at all.    PAF: No recurrence on amiodarone.  The amiodarone dose will be reduced today.    Leukocytosis: Etiology is uncertain.  Case discussed with hospitalist today.    Willy Oh M.D.   Cardiologist, [facility]  [phone]

## 2018-10-03 NOTE — ASSESSMENT & PLAN NOTE
EF is severely reduced by echo.  He is on appropriate heart failure medicines and has had no dyspnea.  There is no evidence of volume overload on exam.

## 2018-10-03 NOTE — PROGRESS NOTES
Inpatient Anticoagulation Service Note    Date: 10/3/2018  Reason for Anticoagulation: Atrial Fibrillation   [id number] VASc Score: 3    Hemoglobin Value: (!) 13.2  Hematocrit Value: (!) 38.1  Lab Platelet Value: 267  Target INR: 2.0 to 3.0    INR from last 7 days     Date/Time INR Value    10/03/18 0245 (!)  1.32    10/02/18 0414 (!)  1.32    10/01/18 0240 (!)  1.23    18 0456 (!)  1.14    18 1500  1.13        Dose from last 7 days     Date/Time Dose (mg)    10/03/18 1434  7.5    10/02/18 1334  5    10/01/18 1200  5    18 1600  0    18 1600  5        Significant Interactions: Amiodarone, Aspirin, Statin, Antiplatelet Medications, Antibiotics  Bridge Therapy: No (not needed per MD during rounds on 10/2)  Reversal Agent Administered: Not Applicable    Comments: INR remains the same. H/H stable with no signs of bleeding noted. Antibiotics started for PNA on 10/2. Amiodarone was started on .    Plan: Will increase warfarin today to 7.5 mg daily with an INR in the AM.  Education Material Provided?: No  Pharmacist suggested discharge dosin.5 mg daily with an INR within 48 hours of discharge due to the interaction with amiodarone.     Kerry Ashby, PharmD, BCPS

## 2018-10-04 LAB
ANION GAP SERPL CALC-SCNC: 7 MMOL/L (ref 0–11.9)
BASOPHILS # BLD AUTO: 0.3 % (ref 0–1.8)
BASOPHILS # BLD: 0.06 K/UL (ref 0–0.12)
BUN SERPL-MCNC: 13 MG/DL (ref 8–22)
CALCIUM SERPL-MCNC: 8.3 MG/DL (ref 8.5–10.5)
CHLORIDE SERPL-SCNC: 94 MMOL/L (ref 96–112)
CO2 SERPL-SCNC: 30 MMOL/L (ref 20–33)
COMMENT 1642: NORMAL
CREAT SERPL-MCNC: 0.59 MG/DL (ref 0.5–1.4)
EOSINOPHIL # BLD AUTO: 0.16 K/UL (ref 0–0.51)
EOSINOPHIL NFR BLD: 0.9 % (ref 0–6.9)
ERYTHROCYTE [DISTWIDTH] IN BLOOD BY AUTOMATED COUNT: 46.3 FL (ref 35.9–50)
GLUCOSE BLD-MCNC: 163 MG/DL (ref 65–99)
GLUCOSE BLD-MCNC: 200 MG/DL (ref 65–99)
GLUCOSE BLD-MCNC: 214 MG/DL (ref 65–99)
GLUCOSE BLD-MCNC: 83 MG/DL (ref 65–99)
GLUCOSE SERPL-MCNC: 85 MG/DL (ref 65–99)
HCT VFR BLD AUTO: 36.4 % (ref 42–52)
HGB BLD-MCNC: 12.6 G/DL (ref 14–18)
IMM GRANULOCYTES # BLD AUTO: 0.08 K/UL (ref 0–0.11)
IMM GRANULOCYTES NFR BLD AUTO: 0.5 % (ref 0–0.9)
INR PPP: 1.35 (ref 0.87–1.13)
LYMPHOCYTES # BLD AUTO: 1.46 K/UL (ref 1–4.8)
LYMPHOCYTES NFR BLD: 8.2 % (ref 22–41)
MCH RBC QN AUTO: 30.2 PG (ref 27–33)
MCHC RBC AUTO-ENTMCNC: 34.6 G/DL (ref 33.7–35.3)
MCV RBC AUTO: 87.3 FL (ref 81.4–97.8)
MONOCYTES # BLD AUTO: 1.49 K/UL (ref 0–0.85)
MONOCYTES NFR BLD AUTO: 8.4 % (ref 0–13.4)
MORPHOLOGY BLD-IMP: NORMAL
NEUTROPHILS # BLD AUTO: 14.45 K/UL (ref 1.82–7.42)
NEUTROPHILS NFR BLD: 81.7 % (ref 44–72)
NRBC # BLD AUTO: 0 K/UL
NRBC BLD-RTO: 0 /100 WBC
PLATELET # BLD AUTO: 225 K/UL (ref 164–446)
PMV BLD AUTO: 10.6 FL (ref 9–12.9)
POTASSIUM SERPL-SCNC: 3.8 MMOL/L (ref 3.6–5.5)
PROCALCITONIN SERPL-MCNC: 0.43 NG/ML
PROTHROMBIN TIME: 16.7 SEC (ref 12–14.6)
RBC # BLD AUTO: 4.17 M/UL (ref 4.7–6.1)
SODIUM SERPL-SCNC: 131 MMOL/L (ref 135–145)
WBC # BLD AUTO: 17.7 K/UL (ref 4.8–10.8)

## 2018-10-04 PROCEDURE — A9270 NON-COVERED ITEM OR SERVICE: HCPCS | Performed by: INTERNAL MEDICINE

## 2018-10-04 PROCEDURE — 700102 HCHG RX REV CODE 250 W/ 637 OVERRIDE(OP): Performed by: INTERNAL MEDICINE

## 2018-10-04 PROCEDURE — 85610 PROTHROMBIN TIME: CPT

## 2018-10-04 PROCEDURE — 97530 THERAPEUTIC ACTIVITIES: CPT

## 2018-10-04 PROCEDURE — 770020 HCHG ROOM/CARE - TELE (206)

## 2018-10-04 PROCEDURE — 82962 GLUCOSE BLOOD TEST: CPT | Mod: 91

## 2018-10-04 PROCEDURE — 80048 BASIC METABOLIC PNL TOTAL CA: CPT

## 2018-10-04 PROCEDURE — 99232 SBSQ HOSP IP/OBS MODERATE 35: CPT | Performed by: INTERNAL MEDICINE

## 2018-10-04 PROCEDURE — 84145 PROCALCITONIN (PCT): CPT

## 2018-10-04 PROCEDURE — 700111 HCHG RX REV CODE 636 W/ 250 OVERRIDE (IP): Performed by: INTERNAL MEDICINE

## 2018-10-04 PROCEDURE — 36415 COLL VENOUS BLD VENIPUNCTURE: CPT

## 2018-10-04 PROCEDURE — 700105 HCHG RX REV CODE 258: Performed by: INTERNAL MEDICINE

## 2018-10-04 PROCEDURE — 85025 COMPLETE CBC W/AUTO DIFF WBC: CPT

## 2018-10-04 RX ORDER — FUROSEMIDE 10 MG/ML
20 INJECTION INTRAMUSCULAR; INTRAVENOUS ONCE
Status: COMPLETED | OUTPATIENT
Start: 2018-10-04 | End: 2018-10-04

## 2018-10-04 RX ORDER — LOSARTAN POTASSIUM 25 MG/1
75 TABLET ORAL
Status: DISCONTINUED | OUTPATIENT
Start: 2018-10-05 | End: 2018-10-05 | Stop reason: HOSPADM

## 2018-10-04 RX ADMIN — POTASSIUM CHLORIDE 10 MEQ: 1500 TABLET, EXTENDED RELEASE ORAL at 04:56

## 2018-10-04 RX ADMIN — ATORVASTATIN CALCIUM 80 MG: 80 TABLET, FILM COATED ORAL at 17:48

## 2018-10-04 RX ADMIN — WARFARIN SODIUM 7.5 MG: 7.5 TABLET ORAL at 17:48

## 2018-10-04 RX ADMIN — INSULIN HUMAN 3 UNITS: 100 INJECTION, SOLUTION PARENTERAL at 12:24

## 2018-10-04 RX ADMIN — SPIRONOLACTONE 25 MG: 25 TABLET ORAL at 04:55

## 2018-10-04 RX ADMIN — FUROSEMIDE 20 MG: 10 INJECTION, SOLUTION INTRAMUSCULAR; INTRAVENOUS at 14:49

## 2018-10-04 RX ADMIN — DOXYCYCLINE 100 MG: 100 TABLET ORAL at 04:56

## 2018-10-04 RX ADMIN — INSULIN GLARGINE 25 UNITS: 100 INJECTION, SOLUTION SUBCUTANEOUS at 17:51

## 2018-10-04 RX ADMIN — METOPROLOL SUCCINATE 50 MG: 25 TABLET, EXTENDED RELEASE ORAL at 04:56

## 2018-10-04 RX ADMIN — INSULIN HUMAN 3 UNITS: 100 INJECTION, SOLUTION PARENTERAL at 17:52

## 2018-10-04 RX ADMIN — ANTACID TABLETS 500 MG: 500 TABLET, CHEWABLE ORAL at 20:39

## 2018-10-04 RX ADMIN — AMIODARONE HYDROCHLORIDE 200 MG: 200 TABLET ORAL at 04:56

## 2018-10-04 RX ADMIN — FOLIC ACID 1 MG: 1 TABLET ORAL at 04:55

## 2018-10-04 RX ADMIN — ASPIRIN 81 MG: 81 TABLET, COATED ORAL at 04:56

## 2018-10-04 RX ADMIN — LOSARTAN POTASSIUM 50 MG: 50 TABLET ORAL at 04:57

## 2018-10-04 RX ADMIN — CEFTRIAXONE SODIUM 2 G: 2 INJECTION, POWDER, FOR SOLUTION INTRAMUSCULAR; INTRAVENOUS at 04:55

## 2018-10-04 RX ADMIN — FUROSEMIDE 20 MG: 20 TABLET ORAL at 04:56

## 2018-10-04 ASSESSMENT — PAIN SCALES - GENERAL
PAINLEVEL_OUTOF10: 0
PAINLEVEL_OUTOF10: 0
PAINLEVEL_OUTOF10: 2
PAINLEVEL_OUTOF10: 0

## 2018-10-04 ASSESSMENT — GAIT ASSESSMENTS
ASSISTIVE DEVICE: FRONT WHEEL WALKER
DISTANCE (FEET): 20
DEVIATION: DECREASED BASE OF SUPPORT;SHUFFLED GAIT
GAIT LEVEL OF ASSIST: CONTACT GUARD ASSIST

## 2018-10-04 ASSESSMENT — ENCOUNTER SYMPTOMS
FEVER: 0
STRIDOR: 0
HEARTBURN: 0
BLURRED VISION: 0
PALPITATIONS: 0
WEAKNESS: 0
WHEEZING: 0
CHEST TIGHTNESS: 0
DEPRESSION: 0
SHORTNESS OF BREATH: 0
SORE THROAT: 0
PHOTOPHOBIA: 0
APNEA: 0
INSOMNIA: 0
MYALGIAS: 0
CHOKING: 0
DIZZINESS: 0
NERVOUS/ANXIOUS: 0
HEADACHES: 0
COUGH: 0
CONSTIPATION: 0
ABDOMINAL PAIN: 0
CLAUDICATION: 0
CHILLS: 0
NAUSEA: 0

## 2018-10-04 ASSESSMENT — COGNITIVE AND FUNCTIONAL STATUS - GENERAL
STANDING UP FROM CHAIR USING ARMS: A LITTLE
MOVING FROM LYING ON BACK TO SITTING ON SIDE OF FLAT BED: A LITTLE
MOBILITY SCORE: 18
CLIMB 3 TO 5 STEPS WITH RAILING: TOTAL
WALKING IN HOSPITAL ROOM: A LITTLE
SUGGESTED CMS G CODE MODIFIER MOBILITY: CK

## 2018-10-04 NOTE — PROGRESS NOTES
2 RN skin check complete. No open sores/wounds found.  Patient's pacemaker site dressing clean/dry/intact.   Right groin site soft, open to air.

## 2018-10-04 NOTE — PROGRESS NOTES
Inpatient Anticoagulation Service Note    Date: 10/4/2018  Reason for Anticoagulation: Atrial Fibrillation   [id number] VASc Score: 3    Hemoglobin Value: (!) 12.6  Hematocrit Value: (!) 36.4  Lab Platelet Value: 225 (Results confirmed by repeat testing.)  Target INR: 2.0 to 3.0    INR from last 7 days     Date/Time INR Value    10/04/18 0307 (!)  1.35    10/03/18 0245 (!)  1.32    10/02/18 0414 (!)  1.32    10/01/18 0240 (!)  1.23    18 0456 (!)  1.14    18 1500  1.13        Dose from last 7 days     Date/Time Dose (mg)    10/04/18 1331  7.5    10/03/18 1434  7.5    10/02/18 1334  5    10/01/18 1200  5    18 1600  0    18 1600  5        Significant Interactions: Amiodarone, Aspirin, Statin, Antibiotics  Bridge Therapy: No (not needed per MD during rounds on 10/2)  Reversal Agent Administered: Not Applicable    Comments: INR essentially remains unchanged, although he has only received one dose of the 7.5 mg increase. H/H stable with no signs of bleeding noted. Antibiotics started for PNA on 10/2 and will continue through 10/8. Amiodarone was started on . No bleeding noted.    Plan: Warfarin 7.5 mg daily with INR in the AM. Need to be cautious and allow time for the increased dose to take effect given the amiodarone interaction.  Education Material Provided?: No  Pharmacist suggested discharge dosin.5 mg daily with an INR within 48 hours of discharge due to the interaction with amiodarone.     Kerry Ashby, PharmD, BCPS      ADDENDUM:  Agree with plan as outlined above.      Debbi Amaya, ConstantinoD

## 2018-10-04 NOTE — THERAPY
"Physical Therapy Treatment completed.   Bed Mobility:  Supine to Sit:  (up at EOB)  Transfers: Sit to Stand: Minimal Assist (retropulses)  Gait: Level Of Assist: Contact Guard Assist with Front-Wheel Walker       Plan of Care: Plan to complete next treatment by 10/6  Discharge Recommendations: Equipment: Will Continue to Assess for Equipment Needs.   Pt is more alert and able to follow cues today, but lacks endurance, remains limited by fatigue. Continue per plan. See \"Rehab Therapy-Acute\" Patient Summary Report for complete documentation.       "

## 2018-10-04 NOTE — PROGRESS NOTES
Cardiology Follow Up Progress Note    Date of Service  10/4/2018    Attending Physician  Pat Glynn M.D.    Chief Complaint   Severe chest pain and arm pain ×4 days, he lives in [place], he was found to be in complete heart block as well  with active chest pain underwent coronary angiography 9/24/18 showed multivessel CAD in need for CABG, EF 20%, underwent ICD implantation (Medtr[employer]) on 9/26/18 for complete heart block and systolic heart failure.    HPI  Nisa Ross is a 58 y.o. male admitted 9/24/2018 with ST elevation anterior MI, he lives in [place], he was found to be in complete heart block as well  with active chest pain underwent coronary angiography 9/24/18 showed multivessel CAD in need for CABG, EF 20%, underwent ICD implantation (Medtronic) on 9/26/18 for complete heart block and systolic heart failure.      History of diabetes, hypertension, chronic tobacco use (one pack), drinks  12 pack of beer every few days    10/4, NO rhythm  issues overnight, maintaining NSR, chest discomfort after breakfast very brief, thinks it  is because of the vest he is wearing, when checked on him later denied any angina.    Review of Systems  Review of Systems   Respiratory: Negative for apnea, cough, choking, chest tightness, shortness of breath, wheezing and stridor.    Cardiovascular: Negative for palpitations and leg swelling.       Vital signs in last 24 hours  Temp:  [35.8 °C (96.4 °F)-36.9 °C (98.5 °F)] 36.9 °C (98.4 °F)  Pulse:  [76-89] 81  Resp:  [16-28] 18  BP: ()/(53-70) 95/53    Physical Exam  Physical Exam   Constitutional: He is oriented to person, place, and time.   Frail looking   HENT:   Head: Normocephalic.   Eyes: Conjunctivae are normal.   Neck: No JVD present. No thyromegaly present.   Pulmonary/Chest: He has no wheezes.   Abdominal: Soft.   Neurological: He is oriented to person, place, and time.   Skin: Skin is warm and dry.   Cath site , no hematoma       Lab Review  Lab  Results   Component Value Date/Time    WBC 17.7 (H) 10/04/2018 03:07 AM    RBC 4.17 (L) 10/04/2018 03:07 AM    HEMOGLOBIN 12.6 (L) 10/04/2018 03:07 AM    HEMATOCRIT 36.4 (L) 10/04/2018 03:07 AM    MCV 87.3 10/04/2018 03:07 AM    MCH 30.2 10/04/2018 03:07 AM    MCHC 34.6 10/04/2018 03:07 AM    MPV 10.6 10/04/2018 03:07 AM      Lab Results   Component Value Date/Time    SODIUM 131 (L) 10/04/2018 03:07 AM    POTASSIUM 3.8 10/04/2018 03:07 AM    CHLORIDE 94 (L) 10/04/2018 03:07 AM    CO2 30 10/04/2018 03:07 AM    GLUCOSE 85 10/04/2018 03:07 AM    BUN 13 10/04/2018 03:07 AM    CREATININE 0.59 10/04/2018 03:07 AM      Lab Results   Component Value Date/Time    ASTSGOT 32 09/29/2018 04:58 AM    ALTSGPT 77 (H) 09/29/2018 04:58 AM     Lab Results   Component Value Date/Time    CHOLSTRLTOT 128 09/25/2018 01:28 AM    LDL 85 09/25/2018 01:28 AM    HDL 24 (A) 09/25/2018 01:28 AM    TRIGLYCERIDE 94 09/25/2018 01:28 AM    TROPONINI 5.83 (H) 09/30/2018 04:33 PM       Recent Labs      10/03/18   0245   BNPBTYPENAT  776*     Assessment/plan    Severe ischemic cardiomyopathy, LVEF 15-20%  Continue with aspirin 81, Lipitor 80 mg, Toprol-XL 50 mg, losartan 50 mg, spironolactone 25 mg  Acute systolic heart failure, currently euvolemic.  Continue with furosemide 20 mg PO.  Will request standing weigh prior to DC.  Complete heart block status post pacemaker implantation, [employer]tronic,  An appointment with  device check was scheduled for 10/9/18   S/p STEMI, multivessel CAD by coronary angiography 9/24/18, CTS wishes to wait 4-6 weeks before reconsidering CABG.  NO rhythm issues overnight   Maintaining NSR on Amiodarone 200 mg daily  ON OAC with Warfarin,  INR 1.35, continue monitoring as out patient

## 2018-10-04 NOTE — PROGRESS NOTES
Bedside report received, patient care assumed. Patient is showing no signs of distress at this time. POC discussed with patient and verbalizes understanding. Patient denies any needs at this time. Bed is locked and in lowest position, call light within reach, and bed alarm on.

## 2018-10-04 NOTE — CARE PLAN
Problem: Safety  Goal: Will remain free from injury  Outcome: PROGRESSING AS EXPECTED  Patient is a fall risk. He gets up with the walker and with a 1+-2+ assist. Bed alarm is on, call light within reach, hourly rounding in place, and room close to the nurses station. Patient educated and verbalized understanding.     Problem: Skin Integrity  Goal: Risk for impaired skin integrity will decrease  Outcome: PROGRESSING AS EXPECTED  2 RN skin check complete and appropriate interventions in place. Patient is able to turn himself in bed, and sit up in bed.

## 2018-10-04 NOTE — DIETARY
"Nutrition Services: Update   Pt is currently on a cardiac, diabetic diet.  Per chart, pt noted with variable PO intake ranging from 0%-75%.  RD reviewed snacks.  At this time, pt would benefit from nutritional supplements once a day to optimize intake.    RD visited pt on 9/27 for cardiac/DM diet instruction.  Pt reported to RD that he preferred soft foods due to his poor dentition and the sensation of \"food feeling caught/hurting my chest.\"  Nutrition Representative was instructed to offer softer foods to pt.    Recommendations/Plan:  1. Pt may benefit from a texture modified diet.  2. Magic Cups once a day.   3. Encourage intake of meals and supplements.  4. Document intake of all meals and supplements as % taken in ADL's to provide interdisciplinary communication across all shifts.   5. Monitor weight.  6. Nutrition rep will continue to see patient for ongoing meal and snack preferences.    RD following    "

## 2018-10-04 NOTE — HEART FAILURE PROGRAM
Cardiovascular Nurse Navigator () Progress Note:    Discharge plan notes, PT notes, and attending physician notes still indicate that patient needs to go to SNF.    Therefore, I have asked hospital schedulers to push out patient's HF follow up that was scheduled for 10/9.    Should pt end up not going to SNF, he will require an appointment within seven calendar days of discharge.     Hospital schedulers are here seven days a week, [phone] and can be reached at extension [phone], they will handle the seven calendar day follow up appointment adjustments for you if you call them!    Thank you and please call with questions [email or web address]

## 2018-10-04 NOTE — PROGRESS NOTES
[facility] Medicine Daily Progress Note    Date of Service  10/4/2018    Chief Complaint  58 y.o. male admitted 9/24/2018 with chest pain and found to be in complete heart block at OSH and transferred to [facility] for further intervention.    Hospital Course    Patient had emergent heart cath and found to have significant multivessel disease, he had throbectomy, angioplasty with balloon, IABP and temp pacer placed on 9/24/18.  He was seen by cardiac surgery and felt to be too frail to proceed with bypass and maximal medical mangement recommended.  He was seen by Dr Cole and had AICD placed on 9/26 and tolerated well.  He also had complication of DKA which has resolved.      Interval Problem Update  Patient leukocytosis improving, patient on empiric antibiotics for HCAP.  No acute intervention recommended by cardiology.  Plan of care discussed with cardiology nurse practitionerYosvany.    Consultants/Specialty  Cardiology - Althea    Code Status  full    Disposition  Skilled nursing facility, referral sent, acceptance pending, social work assisting.    Review of Systems  Review of Systems   Constitutional: Negative for chills and fever.   HENT: Negative for sore throat.    Eyes: Negative for blurred vision and photophobia.   Respiratory: Negative for cough and shortness of breath.    Cardiovascular: Negative for chest pain, claudication and leg swelling.   Gastrointestinal: Negative for abdominal pain, constipation, heartburn and nausea.   Genitourinary: Negative.    Musculoskeletal: Negative for joint pain and myalgias.   Skin: Negative for itching and rash.   Neurological: Negative for dizziness, weakness and headaches.   Psychiatric/Behavioral: Negative for depression. The patient is not nervous/anxious and does not have insomnia.         Physical Exam  Temp:  [35.8 °C (96.4 °F)-36.9 °C (98.4 °F)] 36.9 °C (98.4 °F)  Pulse:  [76-86] 81  Resp:  [18-28] 18  BP: ()/(53-63) 95/53    Physical Exam   Constitutional: He is  oriented to person, place, and time. He appears well-developed and well-nourished. No distress.   HENT:   Head: Normocephalic and atraumatic.   Right Ear: External ear normal.   Left Ear: External ear normal.   Eyes: Conjunctivae are normal. No scleral icterus.   Neck: Neck supple. No JVD present.   Cardiovascular: Normal rate, regular rhythm and normal heart sounds.  Exam reveals no gallop and no friction rub.    No murmur heard.  Pulmonary/Chest: Effort normal and breath sounds normal. No stridor. No respiratory distress. He has no wheezes. He exhibits no tenderness.   AICD bandage left chest - CDI   Abdominal: Soft. Bowel sounds are normal. He exhibits no mass. There is no tenderness.   Musculoskeletal: He exhibits no edema.   Neurological: He is alert and oriented to person, place, and time. No cranial nerve deficit.   Skin: Skin is warm and dry. He is not diaphoretic. No erythema.   Psychiatric: He has a normal mood and affect. His behavior is normal.   Nursing note and vitals reviewed.      Fluids    Intake/Output Summary (Last 24 hours) at 10/04/18 1151  Last data filed at 10/04/18 0800   Gross per 24 hour   Intake              560 ml   Output             2000 ml   Net            -1440 ml       Laboratory  Recent Labs      10/02/18   0414  10/03/18   0517  10/04/18   0307   WBC  17.3*  19.2*  17.7*   RBC  4.08*  4.36*  4.17*   HEMOGLOBIN  12.1*  13.2*  12.6*   HEMATOCRIT  35.8*  38.1*  36.4*   MCV  87.7  87.4  87.3   MCH  29.7  30.3  30.2   MCHC  33.8  34.6  34.6   RDW  44.5  46.4  46.3   PLATELETCT  260  267  225   MPV  9.8  9.5  10.6     Recent Labs      10/02/18   0414  10/03/18   0246  10/04/18   0307   SODIUM  133*  134*  131*   POTASSIUM  3.7  4.1  3.8   CHLORIDE  94*  94*  94*   CO2  31  29  30   GLUCOSE  99  140*  85   BUN  11  12  13   CREATININE  0.64  0.61  0.59   CALCIUM  8.3*  8.5  8.3*     Recent Labs      10/02/18   0414  10/02/18   1052  10/03/18   0245  10/04/18   0307   APTT  49.1*  63.8*    --    --    INR  1.32*   --   1.32*  1.35*     Recent Labs      10/03/18   0245   BNPBTYPENAT  776*           Imaging  DX-CHEST-PORTABLE (1 VIEW)   Final Result      Stable cardiomegaly.      Atherosclerotic plaque.      DX-CHEST-PORTABLE (1 VIEW)   Final Result      1.  No acute cardiac or pulmonary abnormalities are identified.   2.  Left-sided pacemaker.      EC-ECHOCARDIOGRAM LTD W/O CONT   Final Result      DX-CHEST-PORTABLE (1 VIEW)   Final Result         1.  There is a left sided pacemaker in good position with its distal leads overlying the right atrium and right ventricle.      2.  No evidence of pneumothorax.      EC-ECHOCARDIOGRAM LTD W/O CONT   Final Result      DX-CHEST-PORTABLE (1 VIEW)   Final Result      Bibasilar atelectasis.      Temporary transvenous pacemaker in place.      Apparent interval removal of intra-aortic balloon pump.      US-ABDOMEN COMPLETE SURVEY   Final Result      1.  Limited evaluation of the pancreas and bladder.   2.  Sludge present in the gallbladder.   3.  No findings to indicate acute cholecystitis or biliary obstruction.   4.  Intra-aortic catheter noted consistent with known balloon pump.            EC-ECHOCARDIOGRAM COMPLETE W/O CONT   Final Result      DX-CHEST-PORTABLE (1 VIEW)   Final Result      1.  No acute cardiac or pulmonary abnormalities are identified.   2.  IABP in place      DX-CHEST-PORTABLE (1 VIEW)   Final Result      No acute cardiac or pulmonary abnormalities are identified.      US-CAROTID DOPPLER BILAT   Final Result      US-EXTREMITY VENOUS LOWER BILAT   Final Result      DX-CHEST-LIMITED (1 VIEW)   Final Result      No acute cardiopulmonary disease.           Assessment/Plan  * STEMI involving left anterior descending coronary artery (HCC)   Assessment & Plan    Acute anterior MI.  Status post cardiac cath that showed multi-vessel severe coronary artery disease.  CABG recommended but medical management for 4-6 weeks before considering  intervention.  Warfarin dosing per pharmacy, INR subtherapeutic.  Cardiology following.  Bb, Asa, statin, ARB        Atrial fibrillation with rapid ventricular response (HCC)   Assessment & Plan    Following MI  Responding well to amiodarone and bb  Cardiology consulting  Warfarin, INR 1.35          Cardiogenic shock (HCC)   Assessment & Plan    Resolved  Out of ICU         Acute systolic heart failure (HCC)- (present on admission)   Assessment & Plan    Severe secondary to acute extensive anterior MI.  Ejection fraction estimated to be 20%  CABG recommended following recovery  Bb, ARB, diuresis  AICD placed 9/26 - Dr Cole        Leukocytosis- (present on admission)   Assessment & Plan    Improving, on rocephin/doxycycline for empiric HCAP treatment   Repeat UA is negative for UTI.          Acute-on-chronic kidney injury (HCC)- (present on admission)   Assessment & Plan    Cr has returned to normal  Avoid nephrotoxins.  Renal dose all medications.        Type 2 diabetes mellitus (HCC)- (present on admission)   Assessment & Plan    DKA resolved  Lantus dose titration, SSI, Diabetic diet          Smoking- (present on admission)   Assessment & Plan    Counseling provided.  Nicotine patch available as needed.        Complete heart block by electrocardiogram ([facility])   Assessment & Plan    S/p AICD placement               VTE prophylaxis: warfarin, scds      Quality-Core Measures

## 2018-10-05 VITALS
WEIGHT: 167.55 LBS | SYSTOLIC BLOOD PRESSURE: 98 MMHG | BODY MASS INDEX: 23.99 KG/M2 | HEART RATE: 66 BPM | HEIGHT: 70 IN | OXYGEN SATURATION: 92 % | RESPIRATION RATE: 18 BRPM | TEMPERATURE: 97.6 F | DIASTOLIC BLOOD PRESSURE: 60 MMHG

## 2018-10-05 PROBLEM — D72.829 LEUKOCYTOSIS: Status: RESOLVED | Noted: 2018-09-25 | Resolved: 2018-10-05

## 2018-10-05 PROBLEM — N17.9 ACUTE-ON-CHRONIC KIDNEY INJURY (HCC): Status: RESOLVED | Noted: 2018-09-25 | Resolved: 2018-10-05

## 2018-10-05 PROBLEM — R57.0 CARDIOGENIC SHOCK (HCC): Status: RESOLVED | Noted: 2018-09-25 | Resolved: 2018-10-05

## 2018-10-05 PROBLEM — I48.91 ATRIAL FIBRILLATION WITH RAPID VENTRICULAR RESPONSE (HCC): Status: RESOLVED | Noted: 2018-09-29 | Resolved: 2018-10-05

## 2018-10-05 PROBLEM — N18.9 ACUTE-ON-CHRONIC KIDNEY INJURY (HCC): Status: RESOLVED | Noted: 2018-09-25 | Resolved: 2018-10-05

## 2018-10-05 PROBLEM — I50.21 ACUTE SYSTOLIC HEART FAILURE (HCC): Status: RESOLVED | Noted: 2018-09-25 | Resolved: 2018-10-05

## 2018-10-05 PROBLEM — I21.02 STEMI INVOLVING LEFT ANTERIOR DESCENDING CORONARY ARTERY (HCC): Status: RESOLVED | Noted: 2018-09-24 | Resolved: 2018-10-05

## 2018-10-05 LAB
ANION GAP SERPL CALC-SCNC: 9 MMOL/L (ref 0–11.9)
BASOPHILS # BLD AUTO: 0.8 % (ref 0–1.8)
BASOPHILS # BLD: 0.12 K/UL (ref 0–0.12)
BNP SERPL-MCNC: 978 PG/ML (ref 0–100)
BUN SERPL-MCNC: 17 MG/DL (ref 8–22)
CALCIUM SERPL-MCNC: 8.5 MG/DL (ref 8.5–10.5)
CHLORIDE SERPL-SCNC: 97 MMOL/L (ref 96–112)
CO2 SERPL-SCNC: 28 MMOL/L (ref 20–33)
CREAT SERPL-MCNC: 0.72 MG/DL (ref 0.5–1.4)
EOSINOPHIL # BLD AUTO: 0.26 K/UL (ref 0–0.51)
EOSINOPHIL NFR BLD: 1.7 % (ref 0–6.9)
ERYTHROCYTE [DISTWIDTH] IN BLOOD BY AUTOMATED COUNT: 46.5 FL (ref 35.9–50)
GLUCOSE BLD-MCNC: 125 MG/DL (ref 65–99)
GLUCOSE BLD-MCNC: 160 MG/DL (ref 65–99)
GLUCOSE SERPL-MCNC: 127 MG/DL (ref 65–99)
HCT VFR BLD AUTO: 34.9 % (ref 42–52)
HGB BLD-MCNC: 11.7 G/DL (ref 14–18)
IMM GRANULOCYTES # BLD AUTO: 0.09 K/UL (ref 0–0.11)
IMM GRANULOCYTES NFR BLD AUTO: 0.6 % (ref 0–0.9)
INR PPP: 1.67 (ref 0.87–1.13)
LYMPHOCYTES # BLD AUTO: 1.95 K/UL (ref 1–4.8)
LYMPHOCYTES NFR BLD: 12.8 % (ref 22–41)
MCH RBC QN AUTO: 29.3 PG (ref 27–33)
MCHC RBC AUTO-ENTMCNC: 33.5 G/DL (ref 33.7–35.3)
MCV RBC AUTO: 87.5 FL (ref 81.4–97.8)
MONOCYTES # BLD AUTO: 1.12 K/UL (ref 0–0.85)
MONOCYTES NFR BLD AUTO: 7.4 % (ref 0–13.4)
NEUTROPHILS # BLD AUTO: 11.69 K/UL (ref 1.82–7.42)
NEUTROPHILS NFR BLD: 76.7 % (ref 44–72)
NRBC # BLD AUTO: 0 K/UL
NRBC BLD-RTO: 0 /100 WBC
PLATELET # BLD AUTO: 272 K/UL (ref 164–446)
PMV BLD AUTO: 9.9 FL (ref 9–12.9)
POTASSIUM SERPL-SCNC: 4.1 MMOL/L (ref 3.6–5.5)
PROTHROMBIN TIME: 19.8 SEC (ref 12–14.6)
RBC # BLD AUTO: 3.99 M/UL (ref 4.7–6.1)
SODIUM SERPL-SCNC: 134 MMOL/L (ref 135–145)
WBC # BLD AUTO: 15.2 K/UL (ref 4.8–10.8)

## 2018-10-05 PROCEDURE — 85025 COMPLETE CBC W/AUTO DIFF WBC: CPT

## 2018-10-05 PROCEDURE — 83880 ASSAY OF NATRIURETIC PEPTIDE: CPT

## 2018-10-05 PROCEDURE — A9270 NON-COVERED ITEM OR SERVICE: HCPCS | Performed by: INTERNAL MEDICINE

## 2018-10-05 PROCEDURE — 700102 HCHG RX REV CODE 250 W/ 637 OVERRIDE(OP): Performed by: INTERNAL MEDICINE

## 2018-10-05 PROCEDURE — 99239 HOSP IP/OBS DSCHRG MGMT >30: CPT | Performed by: INTERNAL MEDICINE

## 2018-10-05 PROCEDURE — 80048 BASIC METABOLIC PNL TOTAL CA: CPT

## 2018-10-05 PROCEDURE — 99231 SBSQ HOSP IP/OBS SF/LOW 25: CPT | Performed by: INTERNAL MEDICINE

## 2018-10-05 PROCEDURE — 36415 COLL VENOUS BLD VENIPUNCTURE: CPT

## 2018-10-05 PROCEDURE — 700105 HCHG RX REV CODE 258: Performed by: INTERNAL MEDICINE

## 2018-10-05 PROCEDURE — 82962 GLUCOSE BLOOD TEST: CPT

## 2018-10-05 PROCEDURE — 700111 HCHG RX REV CODE 636 W/ 250 OVERRIDE (IP): Performed by: INTERNAL MEDICINE

## 2018-10-05 PROCEDURE — 85610 PROTHROMBIN TIME: CPT

## 2018-10-05 RX ORDER — FUROSEMIDE 20 MG/1
20 TABLET ORAL DAILY
Qty: 60 TAB
Start: 2018-10-06 | End: 2019-11-12

## 2018-10-05 RX ORDER — CEFDINIR 300 MG/1
300 CAPSULE ORAL 2 TIMES DAILY
Qty: 6 CAP | Refills: 0
Start: 2018-10-05 | End: 2018-10-08

## 2018-10-05 RX ORDER — INSULIN GLARGINE 100 [IU]/ML
25 INJECTION, SOLUTION SUBCUTANEOUS EVERY EVENING
Qty: 10 ML
Start: 2018-10-05 | End: 2019-12-20

## 2018-10-05 RX ORDER — AMIODARONE HYDROCHLORIDE 200 MG/1
200 TABLET ORAL DAILY
Qty: 30 TAB
Start: 2018-10-06 | End: 2019-11-12

## 2018-10-05 RX ORDER — WARFARIN SODIUM 7.5 MG/1
7.5 TABLET ORAL
Qty: 30 TAB | Refills: 3
Start: 2018-10-05 | End: 2019-11-12 | Stop reason: SDUPTHER

## 2018-10-05 RX ORDER — LOSARTAN POTASSIUM 25 MG/1
75 TABLET ORAL DAILY
Qty: 30 TAB
Start: 2018-10-06 | End: 2019-11-12 | Stop reason: SDUPTHER

## 2018-10-05 RX ORDER — SPIRONOLACTONE 25 MG/1
25 TABLET ORAL DAILY
Qty: 30 TAB | Refills: 3
Start: 2018-10-06 | End: 2019-11-12 | Stop reason: SDUPTHER

## 2018-10-05 RX ORDER — NITROGLYCERIN 0.4 MG/1
0.4 TABLET SUBLINGUAL PRN
Qty: 25 TAB
Start: 2018-10-05 | End: 2019-11-12 | Stop reason: SDUPTHER

## 2018-10-05 RX ORDER — FOLIC ACID 1 MG/1
1 TABLET ORAL DAILY
Qty: 30 TAB
Start: 2018-10-06 | End: 2018-10-16

## 2018-10-05 RX ORDER — DOXYCYCLINE 100 MG/1
100 TABLET ORAL EVERY 12 HOURS
Qty: 6 TAB | Refills: 0
Start: 2018-10-05 | End: 2018-10-08

## 2018-10-05 RX ORDER — METOPROLOL SUCCINATE 50 MG/1
50 TABLET, EXTENDED RELEASE ORAL DAILY
Qty: 30 TAB
Start: 2018-10-06 | End: 2018-10-16 | Stop reason: SDUPTHER

## 2018-10-05 RX ORDER — ATORVASTATIN CALCIUM 80 MG/1
80 TABLET, FILM COATED ORAL EVERY EVENING
Qty: 30 TAB
Start: 2018-10-05 | End: 2019-11-12 | Stop reason: SDUPTHER

## 2018-10-05 RX ORDER — HYDROCODONE BITARTRATE AND ACETAMINOPHEN 10; 325 MG/1; MG/1
1 TABLET ORAL EVERY 6 HOURS PRN
Qty: 20 TAB | Refills: 0 | Status: SHIPPED | OUTPATIENT
Start: 2018-10-05 | End: 2018-10-10

## 2018-10-05 RX ORDER — ASPIRIN 81 MG/1
81 TABLET ORAL DAILY
Qty: 30 TAB
Start: 2018-10-06 | End: 2019-11-12 | Stop reason: SDUPTHER

## 2018-10-05 RX ADMIN — FUROSEMIDE 20 MG: 20 TABLET ORAL at 09:21

## 2018-10-05 RX ADMIN — CEFTRIAXONE SODIUM 2 G: 2 INJECTION, POWDER, FOR SOLUTION INTRAMUSCULAR; INTRAVENOUS at 05:28

## 2018-10-05 RX ADMIN — AMIODARONE HYDROCHLORIDE 200 MG: 200 TABLET ORAL at 05:27

## 2018-10-05 RX ADMIN — SPIRONOLACTONE 25 MG: 25 TABLET ORAL at 09:21

## 2018-10-05 RX ADMIN — POTASSIUM CHLORIDE 10 MEQ: 1500 TABLET, EXTENDED RELEASE ORAL at 06:00

## 2018-10-05 RX ADMIN — DOXYCYCLINE 100 MG: 100 TABLET ORAL at 05:27

## 2018-10-05 RX ADMIN — LOSARTAN POTASSIUM 75 MG: 25 TABLET, FILM COATED ORAL at 05:26

## 2018-10-05 RX ADMIN — INSULIN HUMAN 3 UNITS: 100 INJECTION, SOLUTION PARENTERAL at 13:14

## 2018-10-05 RX ADMIN — ASPIRIN 81 MG: 81 TABLET, COATED ORAL at 05:27

## 2018-10-05 RX ADMIN — FOLIC ACID 1 MG: 1 TABLET ORAL at 05:27

## 2018-10-05 ASSESSMENT — ENCOUNTER SYMPTOMS
WHEEZING: 0
COUGH: 0
CHEST TIGHTNESS: 0
STRIDOR: 0
CHOKING: 0
SHORTNESS OF BREATH: 0
PALPITATIONS: 0
APNEA: 0

## 2018-10-05 ASSESSMENT — PAIN SCALES - GENERAL
PAINLEVEL_OUTOF10: 0

## 2018-10-05 NOTE — DISCHARGE SUMMARY
Discharge Summary    CHIEF COMPLAINT ON ADMISSION  Chief Complaint   Patient presents with   • Shortness of Breath   • Weakness   • N/V   • Dysrhythmia       Reason for Admission  Whitney Arora     Admission Date  9/24/2018    CODE STATUS  Full Code    HPI & HOSPITAL COURSE  This is a 58 y.o. male transferred from [place] for acute anterior STEMI and third-degree AV block.  Patient was started on a heparin drip and radiology was consulted.  Patient was taken for emergent cardiac catheterization which showed multivessel severe coronary artery disease with occlusion of proximal left anterior descending artery with long dissection of the middle portion,  large   diagonal branch with proximal concentric 99% stenosis, large ramus intermedius   with proximal concentric 80% stenosis, dominant right coronary artery which is occluded at the ostium with small to moderate posterior descending artery and posterior lateral branches filling via left to right collaterals.  Patient then had a thrombectomy, angioplasty with balloon and intra-aortic balloon pump placed with temporary pacemaker.  Cardio thoracic surgery was consulted for evaluation for possible bypass the patient was felt to be too frail and high risk for procedure secondary to his medical comorbidities and conservative medical management was recommended.  He is to follow-up with cardio thoracic surgery in 4-6 weeks for consideration of intervention.  For patient's third degree AV block he was evaluated by electrophysiology and had a AICD placed.  Patient tolerated the procedure well.  Patient during his hospital stay was also noted to have DKA which has since then resolved.  Patient was initially admitted to the ICU for close monitoring and then transferred to telemetry where he continued to be monitored.  Patient had a echocardiogram which showed EF of 20%.  She was also noted to have acute systolic heart failure secondary to acute anterior MI.  Patient was  diuresed, started on beta-blocker and spironolactone.  Patient during his hospital stay was also treated for HCAP and is discharged to skilled nursing facility with Omnicef and doxycycline for total of 3 days to complete his antibiotic course.  He was evaluated by physical and Occupational Therapy during his hospital stay and is transferred to rehabilitation facility for ongoing therapy.         Therefore, he is discharged in good and stable condition to skilled nursing facility.    The patient met 2-midnight criteria for an inpatient stay at the time of discharge.    Discharge Date  10/5/18    FOLLOW UP ITEMS POST DISCHARGE  PCP as directed by [facility]  Cardiothoracic surgery in 4-6 weeks      DISCHARGE DIAGNOSES  Principal Problem (Resolved):    STEMI involving left anterior descending coronary artery (HCC) POA: Unknown  Active Problems:    CAD (coronary artery disease) POA: Unknown    Complete heart block by electrocardiogram (HCC) POA: Unknown    Smoking POA: Yes    Type 2 diabetes mellitus (HCC) POA: Yes  Resolved Problems:    Atrial fibrillation with rapid ventricular response (HCC) POA: No    Leukocytosis POA: Yes    Acute systolic heart failure (HCC) POA: Yes    Cardiogenic shock (HCC) POA: Unknown    Acute-on-chronic kidney injury (HCC) POA: Yes      FOLLOW UP  Future Appointments  Date Time Provider Department Center   10/9/2018 11:00 AM PACER CHECK-[facility] B [facility] None   10/16/2018 1:20 PM JAIDEN Ro [facility] None     [facility]  [address]  [address]  [facility] [address]  [phone]  Schedule an appointment as soon as possible for a visit  Your doctor has referred you to intensive cardiac rehab; please call to make an appointment    Rocky Hightower M.D.  [address] [facility] [id number]  [facility] [address]  [phone]    Schedule an appointment as soon as possible for a visit in 4 weeks        MEDICATIONS ON DISCHARGE     Medication List      START taking these  medications      Instructions   amiodarone 200 MG Tabs  Commonly known as:  CORDARONE   Take 1 Tab by mouth every day.  Dose:  200 mg     aspirin 81 MG EC tablet   Take 1 Tab by mouth every day.  Dose:  81 mg     atorvastatin 80 MG tablet  Commonly known as:  LIPITOR   Take 1 Tab by mouth every evening.  Dose:  80 mg     cefdinir 300 MG Caps  Commonly known as:  OMNICEF   Take 1 Cap by mouth 2 times a day for 3 days.  Dose:  300 mg     doxycycline monohydrate 100 MG tablet  Commonly known as:  ADOXA   Take 1 Tab by mouth every 12 hours for 3 days.  Dose:  100 mg     folic acid 1 MG Tabs  Commonly known as:  FOLVITE   Take 1 Tab by mouth every day.  Dose:  1 mg     furosemide 20 MG Tabs  Commonly known as:  LASIX   Take 1 Tab by mouth every day.  Dose:  20 mg     HYDROcodone/acetaminophen  MG Tabs  Commonly known as:  NORCO   Take 1 Tab by mouth every 6 hours as needed for up to 5 days.  Dose:  1 Tab     insulin regular 100 Unit/mL Soln  Commonly known as:  HUMULIN R   Inject 3-14 Units as instructed 4 Times a Day,Before Meals and at Bedtime.  Dose:  3-14 Units     losartan 25 MG Tabs  Commonly known as:  COZAAR   Take 3 Tabs by mouth every day.  Dose:  75 mg     metoprolol SR 50 MG Tb24  Commonly known as:  TOPROL XL   Take 1 Tab by mouth every day.  Dose:  50 mg     nitroglycerin 0.4 MG Subl  Commonly known as:  NITROSTAT   Place 1 Tab under tongue as needed for Chest Pain (up to 3 doses (if SBP greater than 90 mmHg)).  Dose:  0.4 mg     spironolactone 25 MG Tabs  Commonly known as:  ALDACTONE   Take 1 Tab by mouth every day.  Dose:  25 mg     warfarin 7.5 MG Tabs  Commonly known as:  COUMADIN   Take 1 Tab by mouth COUMADIN-DAILY.  Dose:  7.5 mg        CHANGE how you take these medications      Instructions   insulin glargine 100 UNIT/ML Soln  What changed:  · how much to take  · when to take this  Commonly known as:  LANTUS   Inject 25 Units as instructed every evening.  Dose:  25 Units        CONTINUE  taking these medications      Instructions   gabapentin 600 MG tablet  Commonly known as:  NEURONTIN   Take 600 mg by mouth 3 times a day.  Dose:  600 mg     ibuprofen 200 MG Tabs  Commonly known as:  MOTRIN   Take 400 mg by mouth 2 times a day as needed.  Dose:  400 mg            Allergies  No Known Allergies    DIET  Orders Placed This Encounter   Procedures   • Diet Order Cardiac, Diabetic     Standing Status:   Standing     Number of Occurrences:   1     Order Specific Question:   Diet:     Answer:   Cardiac [6]     Order Specific Question:   Diet:     Answer:   Diabetic [3]       ACTIVITY  As tolerated.  Weight bearing as tolerated    CONSULTATIONS  Dr. Hightower -cardiothoracic surgery   To - Cardiology     PROCEDURES  Left heart cardiac catheterization, thrombectomy, intra-aortic balloon placement, temporary pacemaker placement  AICD placement     LABORATORY  Lab Results   Component Value Date    SODIUM 134 (L) 10/05/2018    POTASSIUM 4.1 10/05/2018    CHLORIDE 97 10/05/2018    CO2 28 10/05/2018    GLUCOSE 127 (H) 10/05/2018    BUN 17 10/05/2018    CREATININE 0.72 10/05/2018        Lab Results   Component Value Date    WBC 15.2 (H) 10/05/2018    HEMOGLOBIN 11.7 (L) 10/05/2018    HEMATOCRIT 34.9 (L) 10/05/2018    PLATELETCT 272 10/05/2018      This dictation was created using voice recognition software. The accuracy of the dictation is limited to the abilities of the software. I expect there may be some errors of grammar and possibly content.    Total time of the discharge process exceeds 42 minutes.

## 2018-10-05 NOTE — CARE PLAN
Problem: Safety  Goal: Will remain free from falls    Intervention: Implement fall precautions  Fall precautions in place. Treaded socks on pt. Appropriate signs on doorway. Bedrails up. Bed in lowest position and locked.  Call light and phone within reach. Patient educated on importance of calling nurses before getting out of bed, verbalizes understanding. Bed alarm on.

## 2018-10-05 NOTE — PROGRESS NOTES
Bedside report received, pt care assumed, tele box on.  Pt is awake in bed. He is A&Ox4. Denies any pain at this time. Bed in lowest position, bed alarm on pt educated on fall risk and verbalized understanding, call light within reach.

## 2018-10-05 NOTE — PROGRESS NOTES
Cardiology Follow Up Progress Note    Date of Service  10/5/2018    Attending Physician  Pat Glynn M.D.    Chief Complaint   Severe chest pain and arm pain ×4 days, he lives in [place], he was found to be in complete heart block as well  with active chest pain underwent coronary angiography 9/24/18 showed multivessel CAD in need for CABG, EF 20%, underwent ICD implantation ([employer]) on 9/26/18 for complete heart block and systolic heart failure.    HPI  Nisa Ross is a 58 y.o. male admitted 9/24/2018 with ST elevation anterior MI, he lives in [place], he was found to be in complete heart block as well  with active chest pain underwent coronary angiography 9/24/18 showed multivessel CAD in need for CABG, EF 20%, underwent ICD implantation ([employer]) on 9/26/18 for complete heart block and systolic heart failure.      History of diabetes, hypertension, chronic tobacco use (one pack), drinks  12 pack of beer every few days.        10/5/18, no rhythm issues overnight, paced rhythm, blood pressure soft, asymptomatic, Toprol-XL . Lasix, and and spironolactone were held this morning at 6, will try to have patient take it later today, plan for discharge today      10/4, NO rhythm  issues overnight, maintaining NSR, chest discomfort after breakfast very brief, thinks it  is because of the vest he is wearing, when checked on him later denied any angina.    Review of Systems  Review of Systems   Respiratory: Negative for apnea, cough, choking, chest tightness, shortness of breath, wheezing and stridor.    Cardiovascular: Negative for palpitations and leg swelling.       Vital signs in last 24 hours  Temp:  [36.7 °C (98 °F)-37.5 °C (99.5 °F)] 36.7 °C (98 °F)  Pulse:  [65-78] 67  Resp:  [16-19] 18  BP: ()/(50-78) 91/60    Physical Exam  Physical Exam   Constitutional: He is oriented to person, place, and time.   Frail looking   HENT:   Head: Normocephalic.   Eyes: Conjunctivae are normal.   Neck: No  JVD present. No thyromegaly present.   Pulmonary/Chest: He has no wheezes.   Abdominal: Soft.   Neurological: He is oriented to person, place, and time.   Skin: Skin is warm and dry.   Cath site , no hematoma       Lab Review  Lab Results   Component Value Date/Time    WBC 15.2 (H) 10/05/2018 03:21 AM    RBC 3.99 (L) 10/05/2018 03:21 AM    HEMOGLOBIN 11.7 (L) 10/05/2018 03:21 AM    HEMATOCRIT 34.9 (L) 10/05/2018 03:21 AM    MCV 87.5 10/05/2018 03:21 AM    MCH 29.3 10/05/2018 03:21 AM    MCHC 33.5 (L) 10/05/2018 03:21 AM    MPV 9.9 10/05/2018 03:21 AM      Lab Results   Component Value Date/Time    SODIUM 134 (L) 10/05/2018 03:21 AM    POTASSIUM 4.1 10/05/2018 03:21 AM    CHLORIDE 97 10/05/2018 03:21 AM    CO2 28 10/05/2018 03:21 AM    GLUCOSE 127 (H) 10/05/2018 03:21 AM    BUN 17 10/05/2018 03:21 AM    CREATININE 0.72 10/05/2018 03:21 AM      Lab Results   Component Value Date/Time    ASTSGOT 32 09/29/2018 04:58 AM    ALTSGPT 77 (H) 09/29/2018 04:58 AM     Lab Results   Component Value Date/Time    CHOLSTRLTOT 128 09/25/2018 01:28 AM    LDL 85 09/25/2018 01:28 AM    HDL 24 (A) 09/25/2018 01:28 AM    TRIGLYCERIDE 94 09/25/2018 01:28 AM    TROPONINI 5.83 (H) 09/30/2018 04:33 PM       Recent Labs      10/03/18   0245  10/05/18   0321   BNPBTYPENAT  776*  978*     Assessment/plan  Plan for DC to skilled nursing today, there was no bed available yesterday  Severe ischemic cardiomyopathy, LVEF 15-20%  Continue with aspirin 81, Lipitor 80 mg, Toprol-XL 50 mg, losartan 50 mg, spironolactone 25 mg, do not hold for SBP<90  Acute systolic heart failure, currently euvolemic.  Continue with furosemide 20 mg PO,   Standing weight, 167 pounds  Complete heart block status post pacemaker implantation, [employer]tr[employer],  an appointment with  device check was scheduled for 10/9/18   S/p STEMI, multivessel CAD by coronary angiography 9/24/18, CTS wishes to wait 4-6 weeks before reconsidering CABG.  NO rhythm issues overnight    Maintaining NSR on Amiodarone 200 mg daily  ON OAC with Warfarin,  INR 1.67, continue monitoring as out patient  Appointment with DR Hightower on 10/15/18 at one pm

## 2018-10-05 NOTE — DISCHARGE SUMMARY
Discharge Summary    CHIEF COMPLAINT ON ADMISSION  Chief Complaint   Patient presents with   • Shortness of Breath   • Weakness   • N/V   • Dysrhythmia       Reason for Admission  Whitney Arora     Admission Date  9/24/2018    CODE STATUS  Full Code    HPI & HOSPITAL COURSE  This is a 58 y.o. male transferred from [place] for acute anterior STEMI and third-degree AV block.  Patient was started on a heparin drip and radiology was consulted.  Patient was taken for emergent cardiac catheterization which showed multivessel severe coronary artery disease with occlusion of proximal left anterior descending artery with long dissection of the middle portion,  large   diagonal branch with proximal concentric 99% stenosis, large ramus intermedius   with proximal concentric 80% stenosis, dominant right coronary artery which is occluded at the ostium with small to moderate posterior descending artery and posterior lateral branches filling via left to right collaterals.  Patient then had a thrombectomy, angioplasty with balloon and intra-aortic balloon pump placed with temporary pacemaker.  Cardio thoracic surgery was consulted for evaluation for possible bypass the patient was felt to be too frail and high risk for procedure secondary to his medical comorbidities and conservative medical management was recommended.  He is to follow-up with cardio thoracic surgery in 4-6 weeks for consideration of intervention.  For patient's third degree AV block he was evaluated by electrophysiology and had a AICD placed.  Patient tolerated the procedure well.  Patient during his hospital stay was also noted to have DKA which has since then resolved.  Patient was initially admitted to the ICU for close monitoring and then transferred to telemetry where he continued to be monitored.  Patient had a echocardiogram which showed EF of 20%.  She was also noted to have acute systolic heart failure secondary to acute anterior MI.  Patient was  diuresed, started on beta-blocker and spironolactone.  Patient during his hospital stay was also treated for HCAP and is discharged to skilled nursing facility with Omnicef and doxycycline for total of 3 days to complete his antibiotic course.   Patient was seen and examined on the day of discharge.  He was evaluated by physical and Occupational Therapy during his hospital stay and is transferred to rehabilitation facility for ongoing therapy.         Therefore, he is discharged in good and stable condition to skilled nursing facility.    The patient met 2-midnight criteria for an inpatient stay at the time of discharge.    Discharge Date  10/5/18    FOLLOW UP ITEMS POST DISCHARGE  PCP as directed by [facility]  Cardiothoracic surgery in 4-6 weeks      DISCHARGE DIAGNOSES  Principal Problem (Resolved):    STEMI involving left anterior descending coronary artery (HCC) POA: Unknown  Active Problems:    CAD (coronary artery disease) POA: Unknown    Complete heart block by electrocardiogram (HCC) POA: Unknown    Smoking POA: Yes    Type 2 diabetes mellitus (HCC) POA: Yes  Resolved Problems:    Atrial fibrillation with rapid ventricular response (HCC) POA: No    Leukocytosis POA: Yes    Acute systolic heart failure (HCC) POA: Yes    Cardiogenic shock (HCC) POA: Unknown    Acute-on-chronic kidney injury (HCC) POA: Yes      FOLLOW UP  Future Appointments  Date Time Provider Department Center   10/9/2018 11:00 AM PACER CHECK-[facility] None   10/16/2018 1:20 PM JAIDEN [facility] None     [facility]  [address]  [address]  [facility] [id number] [phone]  Schedule an appointment as soon as possible for a visit  Your doctor has referred you to intensive cardiac rehab; please call to make an appointment    Rocky Hightower M.D.  [id number]  [facility] [address]  [phone]    Schedule an appointment as soon as possible for a visit in 4 weeks        MEDICATIONS ON  DISCHARGE     Medication List      START taking these medications      Instructions   amiodarone 200 MG Tabs  Commonly known as:  CORDARONE   Take 1 Tab by mouth every day.  Dose:  200 mg     aspirin 81 MG EC tablet   Take 1 Tab by mouth every day.  Dose:  81 mg     atorvastatin 80 MG tablet  Commonly known as:  LIPITOR   Take 1 Tab by mouth every evening.  Dose:  80 mg     cefdinir 300 MG Caps  Commonly known as:  OMNICEF   Take 1 Cap by mouth 2 times a day for 3 days.  Dose:  300 mg     doxycycline monohydrate 100 MG tablet  Commonly known as:  ADOXA   Take 1 Tab by mouth every 12 hours for 3 days.  Dose:  100 mg     folic acid 1 MG Tabs  Commonly known as:  FOLVITE   Take 1 Tab by mouth every day.  Dose:  1 mg     furosemide 20 MG Tabs  Commonly known as:  LASIX   Take 1 Tab by mouth every day.  Dose:  20 mg     HYDROcodone/acetaminophen  MG Tabs  Commonly known as:  NORCO   Take 1 Tab by mouth every 6 hours as needed for up to 5 days.  Dose:  1 Tab     insulin regular 100 Unit/mL Soln  Commonly known as:  HUMULIN R   Inject 3-14 Units as instructed 4 Times a Day,Before Meals and at Bedtime.  Dose:  3-14 Units     losartan 25 MG Tabs  Commonly known as:  COZAAR   Take 3 Tabs by mouth every day.  Dose:  75 mg     metoprolol SR 50 MG Tb24  Commonly known as:  TOPROL XL   Take 1 Tab by mouth every day.  Dose:  50 mg     nitroglycerin 0.4 MG Subl  Commonly known as:  NITROSTAT   Place 1 Tab under tongue as needed for Chest Pain (up to 3 doses (if SBP greater than 90 mmHg)).  Dose:  0.4 mg     spironolactone 25 MG Tabs  Commonly known as:  ALDACTONE   Take 1 Tab by mouth every day.  Dose:  25 mg     warfarin 7.5 MG Tabs  Commonly known as:  COUMADIN   Take 1 Tab by mouth COUMADIN-DAILY.  Dose:  7.5 mg        CHANGE how you take these medications      Instructions   insulin glargine 100 UNIT/ML Soln  What changed:  · how much to take  · when to take this  Commonly known as:  LANTUS   Inject 25 Units as instructed  every evening.  Dose:  25 Units        CONTINUE taking these medications      Instructions   gabapentin 600 MG tablet  Commonly known as:  NEURONTIN   Take 600 mg by mouth 3 times a day.  Dose:  600 mg     ibuprofen 200 MG Tabs  Commonly known as:  MOTRIN   Take 400 mg by mouth 2 times a day as needed.  Dose:  400 mg            Allergies  No Known Allergies    DIET  Orders Placed This Encounter   Procedures   • Diet Order Cardiac, Diabetic     Standing Status:   Standing     Number of Occurrences:   1     Order Specific Question:   Diet:     Answer:   Cardiac [6]     Order Specific Question:   Diet:     Answer:   Diabetic [3]       ACTIVITY  As tolerated.  Weight bearing as tolerated    CONSULTATIONS  Dr. Hightower -cardiothoracic surgery  Dr. Page - Cardiology     PROCEDURES  Left heart cardiac catheterization, thrombectomy, intra-aortic balloon placement, temporary pacemaker placement  AICD placement     LABORATORY  Lab Results   Component Value Date    SODIUM 134 (L) 10/05/2018    POTASSIUM 4.1 10/05/2018    CHLORIDE 97 10/05/2018    CO2 28 10/05/2018    GLUCOSE 127 (H) 10/05/2018    BUN 17 10/05/2018    CREATININE 0.72 10/05/2018        Lab Results   Component Value Date    WBC 15.2 (H) 10/05/2018    HEMOGLOBIN 11.7 (L) 10/05/2018    HEMATOCRIT 34.9 (L) 10/05/2018    PLATELETCT 272 10/05/2018      This dictation was created using voice recognition software. The accuracy of the dictation is limited to the abilities of the software. I expect there may be some errors of grammar and possibly content.    Total time of the discharge process exceeds 42 minutes.

## 2018-10-05 NOTE — PROGRESS NOTES
Bedside report received, pt care assumed, tele box on.  Pt is A&Ox4 and denies any pain or additional needs at this time. Bed in lowest position, bed alarm on pt educated on fall risk and verbalized understanding, call light within reach.

## 2018-10-05 NOTE — CARE PLAN
Problem: Skin Integrity  Goal: Risk for impaired skin integrity will decrease    Intervention: Assess and monitor skin integrity, appearance and/or temperature  Assessed Pt skin integrity to identify susceptible areas to breakdown. Implemented preventative measure to decrease risk for breakdown.

## 2018-10-05 NOTE — PROGRESS NOTES
Paged Dr. Petersen regarding Pt's low BP. Given orders to hold Lasix, Spironolactone, and metoprolol.

## 2018-10-05 NOTE — CARE PLAN
Problem: Safety  Goal: Will remain free from falls  Outcome: PROGRESSING AS EXPECTED  Patient is free of hospital falls. Interventions in place and call light reinforced.     Problem: Pain Management  Goal: Pain level will decrease to patient's comfort goal  Outcome: PROGRESSING AS EXPECTED  Patient is not complaining of any pain and is now no longer nauseous.

## 2018-10-06 NOTE — PROGRESS NOTES
Discharge instructions given to patient at bedside, verbalizes understanding and states plans for follow-up with PCP and Cardiologist as recommended. Individualized instruction and CHF discharge information provided on Heart Failure, pacemaker safety, low sodium/fluid restricted diet, new and home medication review, post-discharge activity level, smoking/etoh cessation and worsening of symptoms needing follow-up care. Telemetry monitor/IV cathlon removed. All belongings accounted for, all questions answered at this time.

## 2018-10-06 NOTE — DISCHARGE INSTRUCTIONS
Discharge Instructions    Discharged to other by medical transportation with escort. Discharged via wheelchair, hospital escort: Yes.  Special equipment needed: Not Applicable    Be sure to schedule a follow-up appointment with your primary care doctor or any specialists as instructed.     Discharge Plan:   Diet Plan: Discussed  Activity Level: Discussed  Smoking Cessation Offered: Patient Counseled  Confirmed Follow up Appointment: Appointment Scheduled  Confirmed Symptoms Management: Discussed  Medication Reconciliation Updated: No (Comments)  Pneumococcal Vaccine Administered/Refused: Not given - Patient refused pneumococcal vaccine  Influenza Vaccine Indication: Indicated: 9 to 64 years of age  Influenza Vaccine Given - only chart on this line when given: Influenza Vaccine Given (See MAR)    I understand that a diet low in cholesterol, fat, and sodium is recommended for good health. Unless I have been given specific instructions below for another diet, I accept this instruction as my diet prescription.   Other diet: Heart Healthy    Special Instructions: Diagnosis:  Acute Coronary Syndrome (ACS) is a diagnosis that encompasses cardiac-related chest pain and heart attack. ACS occurs when the blood flow to the heart muscle is severely reduced or cut off completely due to a slow process called atherosclerosis.  Atherosclerosis is a disease in which the coronary arteries become narrow from a buildup of fat, cholesterol, and other substances that combine to form plaque. If the plaque breaks, a blood clot will form and block the blood flow to the heart muscle. This lack of blood flow can cause damage or death to the heart muscle which is called a heart attack or Myocardial Infarction (MI). There are two different types of MIs:  ST Elevation Myocardial Infarction or STEMI (the most severe type of heart attack) and Non-ST Elevation Myocardial Infarction or NSTEMI.    Treatment Plan:  · Cardiac Diet  - Low fat, low salt,  low cholesterol   · Cardiac Rehab  - Your doctor has ordered you a referral to [facility].  Call [phone] to schedule an appointment.  · Attend my follow-up appointment with my Cardiologist.  · Take my medications as prescribed by my doctor  · Exercise daily  · Lower my bad cholesterol and raise my good cholesterol, lower my blood pressure, Reduce stress and Control my diabetes    Medications:  Certain medications are used to treat ACS.  Remember to always take medications as prescribed and never stop talking medications unless told by your doctor.    You have been prescribed the following medicatons:    Aspirin - Aspirin is used as a blood thinning medication and you will require this medication indefinitely.  Anti-platelet/blood thinner - Your Anti-platelet/Blood thinning medication is called plavix, and is used in combination with aspirin to prevent clots from forming in your heart and/or around your stent.  Your doctor will determine how long you need to be on this medicine.  Beta-Blocker - Beta-Blocker metoprolol is used to lower blood pressure and heart rate, and/or helps your heart heal after a heart attack.  Statin - Statin atorvastatin is used to lower cholesterol.  Angiotensin Receptor Blocker (ARB) - Angiotensin Receptor Blocker losartan is used to lower blood pressure and treat heart failure.  Nitroglycerine - Nitroglycerine is used to relieve chest pain.    · Is patient discharged on Warfarin / Coumadin?   Yes    You are receiving the drug warfarin. Please understand the importance of monitoring warfarin with scheduled PT/INR blood draws.  Follow-up as instructed at SNF.    IMPORTANT: HOW TO USE THIS INFORMATION:  This is a summary and does NOT have all possible information about this product. This information does not assure that this product is safe, effective, or appropriate for you. This information is not individual medical advice and does not substitute for the advice of your health care  "professional. Always ask your health care professional for complete information about this product and your specific health needs.      WARFARIN - ORAL (WARF-uh-rin)      COMMON BRAND NAME(S): Coumadin      WARNING:  Warfarin can cause very serious (possibly fatal) bleeding. This is more likely to occur when you first start taking this medication or if you take too much warfarin. To decrease your risk for bleeding, your doctor or other health care provider will monitor you closely and check your lab results (INR test) to make sure you are not taking too much warfarin. Keep all medical and laboratory appointments. Tell your doctor right away if you notice any signs of serious bleeding. See also Side Effects section.      USES:  This medication is used to treat blood clots (such as in deep vein thrombosis-DVT or pulmonary embolus-PE) and/or to prevent new clots from forming in your body. Preventing harmful blood clots helps to reduce the risk of a stroke or heart attack. Conditions that increase your risk of developing blood clots include a certain type of irregular heart rhythm (atrial fibrillation), heart valve replacement, recent heart attack, and certain surgeries (such as hip/knee replacement). Warfarin is commonly called a \"blood thinner,\" but the more correct term is \"anticoagulant.\" It helps to keep blood flowing smoothly in your body by decreasing the amount of certain substances (clotting proteins) in your blood.      HOW TO USE:  Read the Medication Guide provided by your pharmacist before you start taking warfarin and each time you get a refill. If you have any questions, ask your doctor or pharmacist. Take this medication by mouth with or without food as directed by your doctor or other health care professional, usually once a day. It is very important to take it exactly as directed. Do not increase the dose, take it more frequently, or stop using it unless directed by your doctor. Dosage is based on your " medical condition, laboratory tests (such as INR), and response to treatment. Your doctor or other health care provider will monitor you closely while you are taking this medication to determine the right dose for you. Use this medication regularly to get the most benefit from it. To help you remember, take it at the same time each day. It is important to eat a balanced, consistent diet while taking warfarin. Some foods can affect how warfarin works in your body and may affect your treatment and dose. Avoid sudden large increases or decreases in your intake of foods high in vitamin K (such as broccoli, cauliflower, cabbage, brussels sprouts, kale, spinach, and other green leafy vegetables, liver, green tea, certain vitamin supplements). If you are trying to lose weight, check with your doctor before you try to go on a diet. Cranberry products may also affect how your warfarin works. Limit the amount of cranberry juice (16 ounces/480 milliliters a day) or other cranberry products you may drink or eat.      SIDE EFFECTS:  Nausea, loss of appetite, or stomach/abdominal pain may occur. If any of these effects persist or worsen, tell your doctor or pharmacist promptly. Remember that your doctor has prescribed this medication because he or she has judged that the benefit to you is greater than the risk of side effects. Many people using this medication do not have serious side effects. This medication can cause serious bleeding if it affects your blood clotting proteins too much (shown by unusually high INR lab results). Even if your doctor stops your medication, this risk of bleeding can continue for up to a week. Tell your doctor right away if you have any signs of serious bleeding, including: unusual pain/swelling/discomfort, unusual/easy bruising, prolonged bleeding from cuts or gums, persistent/frequent nosebleeds, unusually heavy/prolonged menstrual flow, pink/dark urine, coughing up blood, vomit that is bloody or  looks like coffee grounds, severe headache, dizziness/fainting, unusual or persistent tiredness/weakness, bloody/black/tarry stools, chest pain, shortness of breath, difficulty swallowing. Tell your doctor right away if any of these unlikely but serious side effects occur: persistent nausea/vomiting, severe stomach/abdominal pain, yellowing eyes/skin. This drug rarely has caused very serious (possibly fatal) problems if its effects lead to small blood clots (usually at the beginning of treatment). This can lead to severe skin/tissue damage that may require surgery or amputation if left untreated. Patients with certain blood conditions (protein C or S deficiency) may be at greater risk. Get medical help right away if any of these rare but serious side effects occur: painful/red/purplish patches on the skin (such as on the toe, breast, abdomen), change in the amount of urine, vision changes, confusion, slurred speech, weakness on one side of the body. A very serious allergic reaction to this drug is rare. However, get medical help right away if you notice any symptoms of a serious allergic reaction, including: rash, itching/swelling (especially of the face/tongue/throat), severe dizziness, trouble breathing. This is not a complete list of possible side effects. If you notice other effects not listed above, contact your doctor or pharmacist. In the [place] - Call your doctor for medical advice about side effects. You may report side effects to FDA at 3-374-PBV-5404. In [place] - Call your doctor for medical advice about side effects. You may report side effects to [facility] [place] at [phone].      PRECAUTIONS:  Before taking warfarin, tell your doctor or pharmacist if you are allergic to it; or if you have any other allergies. This product may contain inactive ingredients, which can cause allergic reactions or other problems. Talk to your pharmacist for more details. Before using this medication, tell your doctor or  pharmacist your medical history, especially of: blood disorders (such as anemia, hemophilia), bleeding problems (such as bleeding of the stomach/intestines, bleeding in the brain), blood vessel disorders (such as aneurysms), recent major injury/surgery, liver disease, alcohol use, mental/mood disorders (including memory problems), frequent falls/injuries. It is important that all your doctors and dentists know that you take warfarin. Before having surgery or any medical/dental procedures, tell your doctor or dentist that you are taking this medication and about all the products you use (including prescription drugs, nonprescription drugs, and herbal products). Avoid getting injections into the muscles. If you must have an injection into a muscle (for example, a flu shot), it should be given in the arm. This way, it will be easier to check for bleeding and/or apply pressure bandages. This medication may cause stomach bleeding. Daily use of alcohol while using this medicine will increase your risk for stomach bleeding and may also affect how this medication works. Limit or avoid alcoholic beverages. If you have not been eating well, if you have an illness or infection that causes fever, vomiting, or diarrhea for more than 2 days, or if you start using any antibiotic medications, contact your doctor or pharmacist immediately because these conditions can affect how warfarin works. This medication can cause heavy bleeding. To lower the chance of getting cut, bruised, or injured, use great caution with sharp objects like safety razors and nail cutters. Use an electric razor when shaving and a soft toothbrush when brushing your teeth. Avoid activities such as contact sports. If you fall or injure yourself, especially if you hit your head, call your doctor immediately. Your doctor may need to check you. The Food & Drug Administration has stated that generic warfarin products are interchangeable. However, consult your doctor  "or pharmacist before switching warfarin products. Be careful not to take more than one medication that contains warfarin unless specifically directed by the doctor or health care provider who is monitoring your warfarin treatment. Older adults may be at greater risk for bleeding while using this drug. This medication is not recommended for use during pregnancy because of serious (possibly fatal) harm to an unborn baby. Discuss the use of reliable forms of birth control with your doctor. If you become pregnant or think you may be pregnant, tell your doctor immediately. If you are planning pregnancy, discuss a plan for managing your condition with your doctor before you become pregnant. Your doctor may switch the type of medication you use during pregnancy. Very small amounts of this medication may pass into breast milk but is unlikely to harm a nursing infant. Consult your doctor before breast-feeding.      DRUG INTERACTIONS:  Drug interactions may change how your medications work or increase your risk for serious side effects. This document does not contain all possible drug interactions. Keep a list of all the products you use (including prescription/nonprescription drugs and herbal products) and share it with your doctor and pharmacist. Do not start, stop, or change the dosage of any medicines without your doctor's approval. Warfarin interacts with many prescription, nonprescription, vitamin, and herbal products. This includes medications that are applied to the skin or inside the vagina or rectum. The interactions with warfarin usually result in an increase or decrease in the \"blood-thinning\" (anticoagulant) effect. Your doctor or other health care professional should closely monitor you to prevent serious bleeding or clotting problems. While taking warfarin, it is very important to tell your doctor or pharmacist of any changes in medications, vitamins, or herbal products that you are taking. Some products that " may interact with this drug include: capecitabine, imatinib, mifepristone. Aspirin, aspirin-like drugs (salicylates), and nonsteroidal anti-inflammatory drugs (NSAIDs such as ibuprofen, naproxen, celecoxib) may have effects similar to warfarin. These drugs may increase the risk of bleeding problems if taken during treatment with warfarin. Carefully check all prescription/nonprescription product labels (including drugs applied to the skin such as pain-relieving creams) since the products may contain NSAIDs or salicylates. Talk to your doctor about using a different medication (such as acetaminophen) to treat pain/fever. Low-dose aspirin and related drugs (such as clopidogrel, ticlopidine) should be continued if prescribed by your doctor for specific medical reasons such as heart attack or stroke prevention. Consult your doctor or pharmacist for more details. Many herbal products interact with warfarin. Tell your doctor before taking any herbal products, especially bromelains, coenzyme Q10, cranberry, danshen, dong quai, fenugreek, garlic, ginkgo biloba, ginseng, and Dolores's wort, among others. This medication may interfere with a certain laboratory test to measure theophylline levels, possibly causing false test results. Make sure laboratory personnel and all your doctors know you use this drug.      OVERDOSE:  If overdose is suspected, contact a poison control center or emergency room immediately. [place] residents can call the [place] National Poison Hotline at 1-985.931.4335. [place] residents can call a provincial poison control center. Symptoms of overdose may include: bloody/black/tarry stools, pink/dark urine, unusual/prolonged bleeding.      NOTES:  Do not share this medication with others. Laboratory and/or medical tests (such as INR, complete blood count) must be performed periodically to monitor your progress or check for side effects. Consult your doctor for more details.      MISSED DOSE:  For the best possible  benefit, do not miss any doses. If you do miss a dose and remember on the same day, take it as soon as you remember. If you remember on the next day, skip the missed dose and resume your usual dosing schedule. Do not double the dose to catch up because this could increase your risk for bleeding. Keep a record of missed doses to give to your doctor or pharmacist. Contact your doctor or pharmacist if you miss 2 or more doses in a row.      STORAGE:  Store at room temperature away from light and moisture. Do not store in the bathroom. Keep all medications away from children and pets. Do not flush medications down the toilet or pour them into a drain unless instructed to do so. Properly discard this product when it is  or no longer needed. Consult your pharmacist or local waste disposal company for more details about how to safely discard your product.      MEDICAL ALERT:  Your condition and medication can cause complications in a medical emergency. For information about enrolling in MedicAlert, call 1-814.473.6765 ([place]) or 1-247.300.6570 ([place]).      Information last revised 2010 Copyright(c) 2010 [employer]             Depression / Suicide Risk    As you are discharged from this [facility] facility, it is important to learn how to keep safe from harming yourself.    Recognize the warning signs:  · Abrupt changes in personality, positive or negative- including increase in energy   · Giving away possessions  · Change in eating patterns- significant weight changes-  positive or negative  · Change in sleeping patterns- unable to sleep or sleeping all the time   · Unwillingness or inability to communicate  · Depression  · Unusual sadness, discouragement and loneliness  · Talk of wanting to die  · Neglect of personal appearance   · Rebelliousness- reckless behavior  · Withdrawal from people/activities they love  · Confusion- inability to concentrate     If you or a loved one observes any of  these behaviors or has concerns about self-harm, here's what you can do:  · Talk about it- your feelings and reasons for harming yourself  · Remove any means that you might use to hurt yourself (examples: pills, rope, extension cords, firearm)  · Get professional help from the community (Mental Health, Substance Abuse, psychological counseling)  · Do not be alone:Call your Safe Contact- someone whom you trust who will be there for you.  · Call your local CRISIS HOTLINE 039-8891 or 106-608-9374  · Call your local Children's Mobile Crisis Response Team [place] (972) 392-7847 or www.[employer] Machine  · Call the toll free National Suicide Prevention Hotlines   · National Suicide Prevention Lifeline 481-569-BDOF (0313)  · National Hope Line Network 800-SUICIDE (508-4184)        HF Patient Discharge Instructions  · Monitor your weight daily, and maintain a weight chart, to track your weight changes.   · Activity as tolerated, unless your Doctor has ordered otherwise. Other activity order: As tolerated.  · Follow a low fat, low cholesterol, low salt diet unless instructed otherwise by your Doctor. Read the labels on the back of food products and track your intake of fat, cholesterol and salt.   · Fluid Restriction No. If a Fluid Restriction has been ordered by your Doctor, measure fluids with a measuring cup to ensure that you are not exceeding the restriction.   · No smoking.  · Oxygen No. If your Doctor has ordered that you wear Oxygen at home, it is important to wear it as ordered.  · Did you receive an explanation from staff on the importance of taking each of your medications and why it is necessary to keep taking them unless your doctor says to stop? Yes  · Were all of your questions answered about how to manage your heart failure and what to do if you have increased signs and symptoms after you go home? Yes  · Do you feel like your heart failure care team involved you in the care treatment plan and allowed you  to make decisions regarding your care while in the hospital and addressed any discharge needs you might have? Yes    See the educational handout provided at discharge for more information on monitoring your daily weight, activity and diet. This also explains more about Heart Failure, symptoms of a flare-up and some of the tests that you have undergone.     Warning Signs of a Flare-Up include:  · Swelling in the ankles or lower legs.  · Shortness of breath, while at rest, or while doing normal activities.   · Shortness of breath at night when in bed, or coughing in bed.   · Requiring more pillows to sleep at night, or needing to sit up at night to sleep.  · Feeling weak, dizzy or fatigued.     When to call your Doctor:  · Call [facility] seven days a week from 8:00 a.m. to 8:00 p.m. for medical questions [phone].  · Call your Primary Care Physician or Cardiologist if:   1. You experience any pain radiating to your jaw or neck.  2. You have any difficulty breathing.  3. You experience weight gain of 3 lbs in a day or 5 lbs in a week.   4. You feel any palpitations or irregular heartbeats.  5. You become dizzy or lose consciousness.   If you have had an angiogram or had a pacemaker or AICD placed, and experience:  1. Bleeding, drainage or swelling at the surgical / puncture site.  2. Fever greater than 100.0 F  3. Shock from internal defibrillator.  4. Cool and / or numb extremities.      Cardioverter Defibrillator Implantation  An implantable cardioverter defibrillator (ICD) is a small, lightweight, battery-powered device that is placed (implanted) under the skin in the chest or abdomen. Your caregiver may prescribe an ICD if:  · You have had an irregular heart rhythm (arrhythmia) that originated in the lower chambers of the heart (ventricles).  · Your heart has been damaged by a disease (such as coronary artery disease) or heart condition (such as a heart attack).  An ICD consists of a battery that  lasts several years, a small computer called a pulse generator, and wires called leads that go into the heart. It is used to detect and correct two dangerous arrhythmias: a rapid heart rhythm (tachycardia) and an arrhythmia in which the ventricles contract in an uncoordinated way (fibrillation). When an ICD detects tachycardia, it sends an electrical signal to the heart that restores the heartbeat to normal (cardioversion). This signal is usually painless. If cardioversion does not work or if the ICD detects fibrillation, it delivers a small electrical shock to the heart (defibrillation) to restart the heart. The shock may feel like a strong jolt in the chest. ICDs may be programmed to correct other problems. Sometimes, ICDs are programmed to act as another type of implantable device called a pacemaker. Pacemakers are used to treat a slow heartbeat (bradycardia).  What are the risks?  Generally, the procedure to implant an ICD is safe. However, as with any surgical procedure, complications can occur. Possible complications associated with implanting an ICD include:  · Swelling, bleeding, or bruising at the site where the ICD was implanted.  · Infection at the site where the ICD was implanted.  · A reaction to medicine used during the procedure.  · Nerve, heart, or blood vessel damage.  · Blood clots.  What happens before the procedure?  · You may need to have blood tests, heart tests, or a chest X-ray done before the day of the procedure.  · Ask your caregiver about changing or stopping your regular medicines.  · Make plans to have someone drive you home. You may need to stay in the hospital overnight after the procedure.  · Stop smoking at least 24 hours before the procedure.  · Take a bath or shower the night before the procedure. You may need to scrub your chest or abdomen with a special type of soap.  · Do not eat or drink before your procedure for as long as directed by your caregiver. Ask if it is okay to take  any needed medicine with a small sip of water.  What happens during the procedure?  The procedure to implant an ICD in your chest or abdomen is usually done at a hospital in a room that has a large X-ray machine called a fluoroscope. The machine will be above you during the procedure. It will help your caregiver see your heart during the procedure. Implanting an ICD usually takes 1-3 hours. Before the procedure:  · Small monitors will be put on your body. They will be used to check your heart, blood pressure, and oxygen level.  · A needle will be put into a vein in your hand or arm. This is called an intravenous (IV) access tube. Fluids and medicine will flow directly into your body through the IV tube.  · Your chest or abdomen will be cleaned with a germ-killing (antiseptic) solution. The area may be shaved.  · You may be given medicine to help you relax (sedative).  · You will be given a medicine called a local anesthetic. This medicine will make the surgical site numb while the ICD is implanted. You will be sleepy but awake during the procedure.  After you are numb the procedure will begin. The caregiver will:  · Make a small cut (incision). This will make a pocket deep under your skin that will hold the pulse generator.  · Guide the leads through a large blood vessel into your heart and attach them to the heart muscles. Depending on the ICD, the leads may go into one ventricle or they may go to both ventricles and into an upper chamber of the heart (atrium).  · Test the ICD.  · Close the incision with stitches, glue, or staples.  What happens after the procedure?  · You may feel pain. Some pain is normal. It may last a few days.  · You may stay in a recovery area until the local anesthetic has worn off. Your blood pressure and pulse will be checked often. You will be taken to a room where your heart will be monitored.  · A chest X-ray will be taken. This is done to check that the cardioverter defibrillator is in  the right place.  · You may stay in the hospital overnight.  · A slight bump may be seen over the skin where the ICD was placed. Sometimes, it is possible to feel the ICD under the skin. This is normal.  · In the months and years afterward, your caregiver will check the device, the leads, and the battery every few months. Eventually, when the battery is low, the ICD will be replaced.  Contact a health care provider if:  · Any allergies you have.  · All medicines you are taking, including vitamins, herbs, eyedrops, and over-the-counter medicines and creams.  · Previous problems you or members of your family have had with the use of anesthetics.  · Any blood disorders you have had.  · Other health problems you have.  This information is not intended to replace advice given to you by your health care provider. Make sure you discuss any questions you have with your health care provider.  Document Released: 09/09/2003 Document Revised: 05/25/2017 Document Reviewed: 01/06/2014  [employer] Interactive Patient Education © 2017 [employer]      Acute Coronary Syndrome  Acute coronary syndrome (ACS) is a serious problem in which there is suddenly not enough blood and oxygen supplied to the heart. ACS may mean that one or more of the blood vessels in your heart (coronary arteries) may be blocked. ACS can result in chest pain or a heart attack (myocardial infarction or MI).  What are the causes?  This condition is caused by atherosclerosis, which is the buildup of fat and cholesterol (plaque) on the inside of the arteries. Over time, the plaque may narrow or block the artery, and this will lessen blood flow to the heart. Plaque can also become weak and break off within a coronary artery to form a clot and cause a sudden blockage.  What increases the risk?  The risk factors of this condition include:  · High cholesterol levels.  · High blood pressure (hypertension).  · Smoking.  · Diabetes.  · Age.  · Family history of chest  pain, heart disease, or stroke.  · Lack of exercise.  What are the signs or symptoms?  The most common signs of this condition include:  · Chest pain, which can be:  ¨ A crushing or squeezing in the chest.  ¨ A tightness, pressure, fullness, or heaviness in the chest.  ¨ Present for more than a few minutes, or it can stop and recur.  · Pain in the arms, neck, jaw, or back.  · Unexplained heartburn or indigestion.  · Shortness of breath.  · Nausea.  · Sudden cold sweats.  · Feeling light-headed or dizzy.  Sometimes, this condition has no symptoms.  How is this diagnosed?  ACS may be diagnosed through the following tests:  · Electrocardiogram (ECG).  · Blood tests.  · Coronary angiogram. This is a procedure to look at the coronary arteries to see if there is any blockage.  How is this treated?  Treatment for ACS may include:  · Healthy behavioral changes to reduce or control risk factors.  · Medicine.  · Coronary stenting. A stent helps to keep an artery open.  · Coronary angioplasty. This procedure widens a narrowed or blocked artery.  · Coronary artery bypass surgery. This will allow your blood to pass the blockage (bypass) to reach your heart.  Follow these instructions at home:  Eating and drinking  · Follow a heart-healthy diet. A dietitian can you help to educate you about healthy food options and changes.  · Use healthy cooking methods such as roasting, grilling, broiling, baking, poaching, steaming, or stir-frying. Talk to a dietitian to learn more about healthy cooking methods.  Medicines  · Take medicines only as directed by your health care provider.  · Do not take the following medicines unless your health care provider approves:  ¨ Nonsteroidal anti-inflammatory drugs (NSAIDs), such as ibuprofen, naproxen, or celecoxib.  ¨ Vitamin supplements that contain vitamin A, vitamin E, or both.  ¨ Hormone replacement therapy that contains estrogen with or without progestin.  · Stop illegal drug  use.  Activity  · Follow an exercise program that is approved by your health care provider.  · Plan rest periods when you are fatigued.  Lifestyle  · Do not use any tobacco products, including cigarettes, chewing tobacco, or electronic cigarettes. If you need help quitting, ask your health care provider.  · If you drink alcohol, and your health care provider approves, limit your alcohol intake to no more than 1 drink per day. One drink equals 12 ounces of beer, 5 ounces of wine, or 1½ ounces of hard liquor.  · Learn to manage stress.  · Maintain a healthy weight. Lose weight as approved by your health care provider.  General instructions  · Manage other health conditions, such as hypertension and diabetes, as directed by your health care provider.  · Keep all follow-up visits as directed by your health care provider. This is important.  · Your health care provider may ask you to monitor your blood pressure. A blood pressure reading consists of a higher number over a lower number, such as 110 over 72, written as 110/72. Ideally, your blood pressure should be:  ¨ Below 140/90 if you have no other medical conditions.  ¨ Below 130/80 if you have diabetes or kidney disease.  Get help right away if:  · You have pain in your chest, neck, arm, jaw, stomach, or back that lasts more than a few minutes, is recurring, or is not relieved by taking medicine under your tongue (sublingual nitroglycerin).  · You have profuse sweating without cause.  · You have unexplained:  ¨ Heartburn or indigestion.  ¨ Shortness of breath or difficulty breathing.  ¨ Nausea or vomiting.  ¨ Fatigue.  ¨ Feelings of nervousness or anxiety.  ¨ Weakness.  ¨ Diarrhea.  · You have sudden light-headedness or dizziness.  · You faint.  These symptoms may represent a serious problem that is an emergency. Do not wait to see if the symptoms will go away. Get medical help right away. Call your local emergency services (911 in the [place] Do not drive yourself to  the clinic or hospital.   This information is not intended to replace advice given to you by your health care provider. Make sure you discuss any questions you have with your health care provider.  Document Released: 12/18/2006 Document Revised: 05/31/2017 Document Reviewed: 04/21/2015  [employer] Interactive Patient Education © 2017 [employer]

## 2018-10-09 ENCOUNTER — NON-PROVIDER VISIT (OUTPATIENT)
Dept: CARDIOLOGY | Facility: MEDICAL CENTER | Age: 58
End: 2018-10-09
Payer: MEDICARE

## 2018-10-09 VITALS
HEIGHT: 60 IN | DIASTOLIC BLOOD PRESSURE: 62 MMHG | OXYGEN SATURATION: 92 % | BODY MASS INDEX: 41.03 KG/M2 | SYSTOLIC BLOOD PRESSURE: 104 MMHG | WEIGHT: 209 LBS | HEART RATE: 88 BPM

## 2018-10-09 DIAGNOSIS — Z95.810 AUTOMATIC IMPLANTABLE CARDIOVERTER-DEFIBRILLATOR IN SITU: ICD-10-CM

## 2018-10-09 PROCEDURE — 93283 PRGRMG EVAL IMPLANTABLE DFB: CPT | Performed by: INTERNAL MEDICINE

## 2018-10-10 NOTE — NON-PROVIDER
S/p new AICD, implanted on 9-. Appropriate device function demonstrated. Wound site appears clear. Advised to watch for redness,swelling, oozing or fever. Pt verbalized understanding. See back in 5 weeks for final testing.

## 2018-10-16 ENCOUNTER — OFFICE VISIT (OUTPATIENT)
Dept: CARDIOLOGY | Facility: MEDICAL CENTER | Age: 58
End: 2018-10-16
Payer: MEDICARE

## 2018-10-16 VITALS
WEIGHT: 163 LBS | HEIGHT: 70 IN | BODY MASS INDEX: 23.34 KG/M2 | OXYGEN SATURATION: 99 % | SYSTOLIC BLOOD PRESSURE: 110 MMHG | DIASTOLIC BLOOD PRESSURE: 70 MMHG | HEART RATE: 84 BPM

## 2018-10-16 DIAGNOSIS — I48.0 PAROXYSMAL ATRIAL FIBRILLATION (HCC): ICD-10-CM

## 2018-10-16 DIAGNOSIS — I25.10 CORONARY ARTERY DISEASE INVOLVING NATIVE CORONARY ARTERY OF NATIVE HEART WITHOUT ANGINA PECTORIS: ICD-10-CM

## 2018-10-16 DIAGNOSIS — F17.200 SMOKING: ICD-10-CM

## 2018-10-16 DIAGNOSIS — I50.9 HEART FAILURE, NYHA CLASS 3 (HCC): ICD-10-CM

## 2018-10-16 DIAGNOSIS — I25.5 ISCHEMIC CARDIOMYOPATHY: ICD-10-CM

## 2018-10-16 DIAGNOSIS — I50.20 ACC/AHA STAGE C SYSTOLIC HEART FAILURE (HCC): ICD-10-CM

## 2018-10-16 DIAGNOSIS — Z95.810 AUTOMATIC IMPLANTABLE CARDIOVERTER-DEFIBRILLATOR IN SITU: ICD-10-CM

## 2018-10-16 DIAGNOSIS — E11.01 TYPE 2 DIABETES MELLITUS WITH HYPEROSMOLAR COMA, UNSPECIFIED WHETHER LONG TERM INSULIN USE (HCC): ICD-10-CM

## 2018-10-16 PROCEDURE — 99214 OFFICE O/P EST MOD 30 MIN: CPT | Performed by: NURSE PRACTITIONER

## 2018-10-16 RX ORDER — METOPROLOL SUCCINATE 25 MG/1
25 TABLET, EXTENDED RELEASE ORAL DAILY
Qty: 30 TAB | Refills: 11 | Status: SHIPPED | OUTPATIENT
Start: 2018-10-16 | End: 2019-11-12 | Stop reason: SDUPTHER

## 2018-10-16 RX ORDER — HYDROCODONE BITARTRATE AND ACETAMINOPHEN 10; 325 MG/1; MG/1
1-2 TABLET ORAL EVERY 6 HOURS PRN
COMMUNITY
End: 2019-11-12

## 2018-10-16 RX ORDER — CEFDINIR 300 MG/1
300 CAPSULE ORAL 2 TIMES DAILY
COMMUNITY
End: 2018-10-16

## 2018-10-16 RX ORDER — DOXYCYCLINE HYCLATE 100 MG
100 TABLET ORAL 2 TIMES DAILY
COMMUNITY
End: 2018-10-16

## 2018-10-16 ASSESSMENT — ENCOUNTER SYMPTOMS
DIZZINESS: 0
MYALGIAS: 0
PALPITATIONS: 0
COUGH: 1
ORTHOPNEA: 1
PND: 0
SPUTUM PRODUCTION: 1
ABDOMINAL PAIN: 0
SHORTNESS OF BREATH: 1
CLAUDICATION: 0
FEVER: 0

## 2018-10-16 ASSESSMENT — LIFESTYLE VARIABLES: ALCOHOL_AMOUNT: 12

## 2018-10-16 ASSESSMENT — MINNESOTA LIVING WITH HEART FAILURE QUESTIONNAIRE (MLHF)
DIFFICULTY WITH SEXUAL ACTIVITIES: 2
MAKING YOU WORRY: 0
FEELING LIKE A BURDEN TO FAMILY AND FRIENDS: 2
DIFFICULTY GOING AWAY FROM HOME: 2
EATING LESS FOODS YOU LIKE: 1
DIFFICULTY WITH RECREATIONAL PASTIMES, SPORTS, HOBBIES: 2
WALKING ABOUT OR CLIMBING STAIRS DIFFICULT: 2
DIFFICULTY TO CONCENTRATE OR REMEMBERING THINGS: 0
MAKING YOU SHORT OF BREATH: 2
DIFFICULTY SOCIALIZING WITH FAMILY OR FRIENDS: 0
COSTING YOU MONEY FOR MEDICAL CARE: 0
GIVING YOU SIDE EFFECTS FROM TREATMENTS: 0
DIFFICULTY WORKING TO EARN A LIVING: 0
WORKING AROUND THE HOUSE OR YARD DIFFICULT: 1
HAVING TO SIT OR LIE DOWN DURING THE DAY: 1
DIFFICULTY SLEEPING WELL AT NIGHT: 2
TIRED, FATIGUED OR LOW ON ENERGY: 1
SWELLING IN ANKLES OR LEGS: 1
LOSS OF SELF CONTROL IN YOUR LIFE: 0
MAKING YOU STAY IN A HOSPITAL: 0
TOTAL_SCORE: 20
MAKING YOU FEEL DEPRESSED: 1

## 2018-10-16 NOTE — PROGRESS NOTES
Chief Complaint   Patient presents with   • Congestive Heart Failure     NEW HEART FAILURE       Subjective:   Nisa Ross is a 58 y.o. male who presents today for follow-up on his heart failure and CAD.    New patient to the office.  Patient had a recent hospitalization from 9/24/18 to 10/5/18 for STEMI and 3 degree Heatr Block.  Patient was taken to the Cath Lab and was found to have multivessel disease.  Patient had a thrombectomy, balloon angioplasty, balloon pump was placed and temporary pacemaker.    Patient was evaluated weighted by CT surgery and was determined to frail and high risk for intervention.  Patient was recommended to follow-up in 4-6 weeks for further evaluation.    Patient did have an ICD placed on 9/26/2018.    Patient is currently residing at [facility].  Patient is receiving physical therapy there.    For his symptoms, patient reports chest pain with cough, patient is unable to lay flat because he coughs, and he reports shortness of breath with some ADLs and with exertion.  He denies any other chest pain, palpitations, PND, edema or dizziness.    Patient is not being weighed regularly at the facility.    Patient denies any shocks.    Additonally, patient has the following medical problems:    -Diabetes    -Hypertension    -Leg surgery and cervical fusion      Past Medical History:   Diagnosis Date   • Acute coronary syndrome ([facility]) 9/24/2018   • Complete heart block by electrocardiogram ([facility]) 9/24/2018   • Diabetes     type II   • DM (diabetes mellitus) ([facility]) 9/24/2018   • HTN (hypertension), malignant 9/24/2018   • Rheumatic fever     1974   • Smoking 9/24/2018   • STEMI (ST elevation myocardial infarction) ([facility]) 9/24/2018     Past Surgical History:   Procedure Laterality Date   • AICD IMPLANT  09/26/2018   • CERVICAL DISK AND FUSION ANTERIOR  4/23/2010    Performed by ESTHER BELL at SUR[facility] ORS   • OTHER ORTHOPEDIC SURGERY  1975    right leg       Family History   Problem Relation Age of Onset   • Cancer Mother    • Heart Disease Brother         CABGx3     Social History     Social History   • Marital status: Single     Spouse name: N/A   • Number of children: N/A   • Years of education: N/A     Occupational History   • Not on file.     Social History Main Topics   • Smoking status: Former Smoker     Packs/day: 1.00     Years: 40.00     Types: Cigarettes     Quit date: 9/28/2018   • Smokeless tobacco: Former User     Types: Chew     Quit date: 10/16/2008      Comment: 1 pack isabel day and a half   • Alcohol use 7.2 oz/week     12 Cans of beer per week      Comment: 12 pack in a month   • Drug use: No   • Sexual activity: Not on file     Other Topics Concern   • Not on file     Social History Narrative   • No narrative on file     No Known Allergies  Outpatient Encounter Prescriptions as of 10/16/2018   Medication Sig Dispense Refill   • HYDROcodone/acetaminophen (NORCO)  MG Tab Take 1-2 Tabs by mouth every 6 hours as needed.     • aspirin EC 81 MG EC tablet Take 1 Tab by mouth every day. 30 Tab    • amiodarone (CORDARONE) 200 MG Tab Take 1 Tab by mouth every day. 30 Tab    • warfarin (COUMADIN) 7.5 MG Tab Take 1 Tab by mouth COUMADIN-DAILY. 30 Tab 3   • insulin glargine (LANTUS) 100 UNIT/ML Solution Inject 25 Units as instructed every evening. 10 mL    • insulin regular (HUMULIN R) 100 Unit/mL Solution Inject 3-14 Units as instructed 4 Times a Day,Before Meals and at Bedtime. 10 mL    • atorvastatin (LIPITOR) 80 MG tablet Take 1 Tab by mouth every evening. 30 Tab    • losartan (COZAAR) 25 MG Tab Take 3 Tabs by mouth every day. 30 Tab    • metoprolol SR (TOPROL XL) 50 MG TABLET SR 24 HR Take 1 Tab by mouth every day. 30 Tab    • furosemide (LASIX) 20 MG Tab Take 1 Tab by mouth every day. 60 Tab    • spironolactone (ALDACTONE) 25 MG Tab Take 1 Tab by mouth every day. 30 Tab 3   • ibuprofen (MOTRIN) 200 MG Tab Take 400 mg by mouth 2 times a day as  "needed.     • [DISCONTINUED] cefdinir (OMNICEF) 300 MG Cap Take 300 mg by mouth 2 times a day.     • [DISCONTINUED] doxycycline (VIBRAMYCIN) 100 MG Tab Take 100 mg by mouth 2 times a day.     • nitroglycerin (NITROSTAT) 0.4 MG SL Tab Place 1 Tab under tongue as needed for Chest Pain (up to 3 doses (if SBP greater than 90 mmHg)). 25 Tab    • [DISCONTINUED] folic acid (FOLVITE) 1 MG Tab Take 1 Tab by mouth every day. 30 Tab    • [DISCONTINUED] gabapentin (NEURONTIN) 600 MG tablet Take 600 mg by mouth 3 times a day.       No facility-administered encounter medications on file as of 10/16/2018.      Review of Systems   Constitutional: Negative for fever and malaise/fatigue.   Respiratory: Positive for cough, sputum production (unsure color) and shortness of breath.    Cardiovascular: Positive for chest pain (with coughing) and orthopnea (starts coughing if lays flat). Negative for palpitations, claudication, leg swelling and PND.   Gastrointestinal: Negative for abdominal pain.   Musculoskeletal: Negative for myalgias.   Neurological: Negative for dizziness.   All other systems reviewed and are negative.       Objective:   /70 (BP Location: Left arm, Patient Position: Sitting, BP Cuff Size: Adult)   Pulse 84   Ht 1.778 m (5' 10\")   Wt 73.9 kg (163 lb)   SpO2 99%   BMI 23.39 kg/m²     Physical Exam   Constitutional: He is oriented to person, place, and time. He appears well-developed and well-nourished.   Using a wheelchair   HENT:   Head: Normocephalic and atraumatic.   Eyes: Pupils are equal, round, and reactive to light. EOM are normal.   Neck: Normal range of motion. Neck supple. No JVD present.   Cardiovascular: Normal rate, regular rhythm and normal heart sounds.    Pulmonary/Chest: Effort normal and breath sounds normal. No respiratory distress. He has no wheezes. He has no rales.   Left chest ICD-no erosion, drainage or Erythema, steri-strips in place   Abdominal: Soft. Bowel sounds are normal. "   Musculoskeletal: He exhibits edema (trace bilateral LE edema).   Neurological: He is alert and oriented to person, place, and time.   Skin: Skin is warm and dry.   Psychiatric: He has a normal mood and affect. His behavior is normal.   Vitals reviewed.    Lab Results   Component Value Date/Time    CHOLSTRLTOT 128 09/25/2018 01:28 AM    LDL 85 09/25/2018 01:28 AM    HDL 24 (A) 09/25/2018 01:28 AM    TRIGLYCERIDE 94 09/25/2018 01:28 AM       Lab Results   Component Value Date/Time    SODIUM 134 (L) 10/05/2018 03:21 AM    POTASSIUM 4.1 10/05/2018 03:21 AM    CHLORIDE 97 10/05/2018 03:21 AM    CO2 28 10/05/2018 03:21 AM    GLUCOSE 127 (H) 10/05/2018 03:21 AM    BUN 17 10/05/2018 03:21 AM    CREATININE 0.72 10/05/2018 03:21 AM     Lab Results   Component Value Date/Time    ALKPHOSPHAT 76 09/29/2018 04:58 AM    ASTSGOT 32 09/29/2018 04:58 AM    ALTSGPT 77 (H) 09/29/2018 04:58 AM    TBILIRUBIN 1.2 09/29/2018 04:58 AM      Transthoracic Echo Report 9/24/2018  No prior study is available for comparison.   Severely reduced left ventricular systolic function. Left ventricular ejection fraction is visually estimated to be 15%.  Only preserved wall motion at the basal anterior wall segment.  No evidence of valvular abnormality based on Doppler evaluation.      Transthoracic Echo Report 9/26/2018  This is a limited echo done to r/o pericardial effusion. LV function remains moderately to severely depressed with regional wall motion abnormalities. There is a trace effusion without evidence of hemodynamic compromise.    Transthoracic Echo Report 9/27/2018  Prior echo 09/26/18, findings are similar  Requested as limited to eval for pericaridal effusion.  LV function about 20-25% visually.  Mild global hypokinesis with LAD territory severe hypo to akinesis.  Trivial pericardial fluid noted.      Assessment:     1. ACC/AHA stage C systolic heart failure (HCC)  BASIC METABOLIC PANEL    metoprolol SR (TOPROL XL) 25 MG TABLET SR 24 HR    2. Heart failure, NYHA class 3 ([facility])  BASIC METABOLIC PANEL    metoprolol SR (TOPROL XL) 25 MG TABLET SR 24 HR   3. Automatic implantable cardioverter-defibrillator in situ     4. Ischemic cardiomyopathy     5. Coronary artery disease involving native coronary artery of native heart without angina pectoris     6. Smoking     7. Paroxysmal atrial fibrillation (HCC)     8. Type 2 diabetes mellitus with hyperosmolar coma, unspecified whether long term insulin use ([facility])         Medical Decision Making:  Today's Assessment / Status / Plan:   1. HFrEF, Stage C, Class 3, LVEF 20-25%: pt does have some trace LE edema  -Patient to start Toprol-XL 25 mg daily (patient left the hospital supposedly taking Toprol-XL 50 mg however, med list from nursing facility does not show that he is on Toprol-XL)  -Continue spironolactone 25 mg daily  -Continues losartan 75 mg daily  -Continue furosemide 20 mg daily  -Obtain a BMP in 1 week  -has ICD-next check 11/13/2018  -Reinforced s/sx of worsening heart failure with patient and weight monitoring. Pt verbalizes understanding. Pt to call office or RTC if present.  -Patient to monitor weights at home/facility daily. Pt to call office if weight increasing >3 lbs in 1 day or >5 lbs in 1 week.      2. CAD, s/p Thrombectomy and balloon angioplasty, needs CABG:  -Continue aspirin 81 mg daily  -Continue atorvastatin 80 mg daily  -Encouraged continued smoking cessation    3.  Paroxysmal A. Fib:  -Continue warfarin, followed by nursing facility  -Continue amiodarone 200 mg daily    4.  Diabetes:  -Recommend to nursing facility to follow blood sugars.  Per mar from facility, patient is not on insulin    FU in clinic in 1 week with HF MD. Sooner if needed.    Patient verbalizes understanding and agrees with the plan of care.     Collaborating MD: Pham Kaplan MD

## 2018-10-16 NOTE — PROGRESS NOTES
"  Education Narrative  Reviewed anatomy and physiology of heart failure with patient. Went over heart failure worksheet and patient's individual HF diagnosis, EF, risk factors, general medication classes and indications, as well as personal goals.  Goals: Patient's primary goal is weigh himself daily while at skilled nursing facility and begin reading nutrition labels and asking about sodium content in foods.     Discussed daily weights, sodium restriction, worsening signs and symptoms to report to physician, heart medications, and importance of adherence to medication regimen. Emphasized recommendation from AHA/AAHFN to keep daily sodium intake between 1500mg-2000mg.      Reviewed dietary handouts, advanced care planning classes, and advance directive planning handout. Discussed dietary considerations and reviewed Seven Day Heart Healthy Meal Plan by [employer].     Invited patient and family members/friends to HF support group and encouraged patient to call Heart Failure clinic during normal business hours with any questions.  Heart Failure program card with number given to patient.     Patient verbalized understanding during teaching session but when points were revisited he kept stating \" I forget, my memory isn't what it used to be\" Patient states when he is home he plays a lot of video games, usually only eats once a day and likes to have hungry man frozen dinners. Patient counseled on importance of changing diet and activity levels. Patient verbalized understanding again but would then continue making statements that seemed like he didn't fully understand. When patient was checking out he declined to make another appointment right now because he wants to get home to [place] and doesn't want to be in [place] next week. Patient encouraged to reconsider and call back to make appointment.       Patient states full understanding of all information given.     OP Heart Failure  Vitals     Weight: 73.9 kg (163 " "lb)        Height: 177.8 cm (5' 10\")  BMI (Calculated): 23.39  Blood Pressure: 110/70  Pulse: 84    System Assessment          Smoking Hx  Have you Ever Smoked: Yes  Have you Smoked in the Last 12 Mos: Yes  Have you Quit Smoking: Yes  Confirm Quit Date: 18  Smoking Cessation Offered: Patient Counseled    Alcohol Hx  Do you Drink?: Yes  How Many Alcoholic Drinks Do You Have: 12  Per Day / Week / Month: Month                      Illicit Drug Hx  Illicit Drug History: No    Social Hx  Social History: Lives Alone  Level of Support: Unreliable  Advance Directives: None, Began discussion, Paperwork provided    Education  Symptoms: Verbalizes understanding, Needs Reinforcement  Weighing: Needs Reinforcement  Weight Gain Response: Needs Reinforcement   Recording Data: Needs Reinforcement  Teach Back Failures: Missed Follow-up Visit  Compliance: Changes in Diet, Rescheduled Appointment       Medications  Medication Reconciliation : Complete  Medication Counseling Provided: Needs Reinforcement  Able to Accurately Identify Medication Indications: Some  Medication Discrepancies: Discontinuation of a Prescribed Medication  Is Patient on an Evidence Based Beta Blocker: Yes  Is Patient on ACE-1 or ARB: Yes    Dietary Assessment  Food Labels: Needs Reinforcement  Foods High in Sodium: Needs Reinforcement  Daily Sodium Intake: Needs Reinforcement  Diet: Needs Reinforcement  Food Preparation: Needs Reinforcement  Eating Out Plan: Needs Reinforcement  Healthier Options: Needs Reinforcement  Fluid Restriction: Not Applicable    [place] Living with Heart Failure  Swelling in Ankles or Legs: 1  Having to Sit or Lie Down During the Day: 1  Walking About or Climbing Stairs Difficult: 2  Working Around the House or Yard Difficult: 1  Difficulty Going Away from Home: 2  Difficulty Sleeping Well at Night: 2  Difficulty Socializing with Family or Friends: 0  Difficulty Working to Earn a Livin  Difficulty with Recreational Pastimes, " Sports, Hobbies: 2  Difficulty with Sexual Activities: 2  Eating Less Foods You Like: 1  Making you Short of Breath: 2  Tired, Fatigued or Low on Energy: 1  Making you Stay in a Hospital: 0  Costing you Money for Medical Care?: 0  Giving you Side Effects from Treatments: 0  Feeling like a [place] to Family and Friends: 2  Loss of Self Control in your Life: 0  Making You Worry: 0  Difficulty to Concentrate or Remembering Things: 0  Making you Feel Depressed: 1  MLHF Total Score : 20    6 Minute Walk Test        Comments: Pt unable to walk due to STEMI

## 2018-11-05 ENCOUNTER — TELEPHONE (OUTPATIENT)
Dept: CARDIOLOGY | Facility: MEDICAL CENTER | Age: 58
End: 2018-11-05

## 2018-11-05 NOTE — TELEPHONE ENCOUNTER
"Left vague message to remind patient to complete non-fasting labs before upcoming appt with Radha on 11/07/2018 because the voicemail name stated \"Carlyle\" and that is neither the patient's name or .   "

## 2018-11-07 ENCOUNTER — NON-PROVIDER VISIT (OUTPATIENT)
Dept: CARDIOLOGY | Facility: MEDICAL CENTER | Age: 58
End: 2018-11-07
Payer: MEDICARE

## 2018-11-07 ENCOUNTER — OFFICE VISIT (OUTPATIENT)
Dept: CARDIOLOGY | Facility: MEDICAL CENTER | Age: 58
End: 2018-11-07
Payer: MEDICARE

## 2018-11-07 VITALS
BODY MASS INDEX: 23.48 KG/M2 | DIASTOLIC BLOOD PRESSURE: 60 MMHG | HEIGHT: 70 IN | WEIGHT: 164 LBS | OXYGEN SATURATION: 96 % | SYSTOLIC BLOOD PRESSURE: 110 MMHG | HEART RATE: 72 BPM

## 2018-11-07 DIAGNOSIS — I25.10 CORONARY ARTERY DISEASE INVOLVING NATIVE CORONARY ARTERY OF NATIVE HEART WITHOUT ANGINA PECTORIS: ICD-10-CM

## 2018-11-07 DIAGNOSIS — Z95.810 AUTOMATIC IMPLANTABLE CARDIOVERTER-DEFIBRILLATOR IN SITU: ICD-10-CM

## 2018-11-07 DIAGNOSIS — I25.5 ISCHEMIC CARDIOMYOPATHY: ICD-10-CM

## 2018-11-07 DIAGNOSIS — F17.200 SMOKING: ICD-10-CM

## 2018-11-07 DIAGNOSIS — I50.9 HEART FAILURE, NYHA CLASS 2 (HCC): ICD-10-CM

## 2018-11-07 DIAGNOSIS — I48.0 PAROXYSMAL ATRIAL FIBRILLATION (HCC): ICD-10-CM

## 2018-11-07 DIAGNOSIS — I50.20 ACC/AHA STAGE C SYSTOLIC HEART FAILURE (HCC): ICD-10-CM

## 2018-11-07 DIAGNOSIS — E11.01 TYPE 2 DIABETES MELLITUS WITH HYPEROSMOLAR COMA, UNSPECIFIED WHETHER LONG TERM INSULIN USE (HCC): ICD-10-CM

## 2018-11-07 PROCEDURE — 99214 OFFICE O/P EST MOD 30 MIN: CPT | Performed by: NURSE PRACTITIONER

## 2018-11-07 PROCEDURE — 93283 PRGRMG EVAL IMPLANTABLE DFB: CPT | Performed by: INTERNAL MEDICINE

## 2018-11-07 ASSESSMENT — ENCOUNTER SYMPTOMS
FEVER: 0
SHORTNESS OF BREATH: 0
COUGH: 0
PND: 0
DIZZINESS: 0
CLAUDICATION: 0
PALPITATIONS: 0
ABDOMINAL PAIN: 0
ORTHOPNEA: 0
MYALGIAS: 0

## 2018-11-07 NOTE — LETTER
[facility]   [address]  [place] [address]  Phone: [phone]  Fax: [phone]              Nisa Ross  1960    Encounter Date: 11/7/2018    JAIDEN Ro          PROGRESS NOTE:  Chief Complaint   Patient presents with   • CHF (Acute)     HFE       Subjective:   Nisa Ross is a 58 y.o. male who presents today for follow-up on his heart failure with his brother, Dannie.    Patient of the heart failure clinic.  Patient was last seen in clinic on 10/16/2018.  During his last visit, Toprol-XL was started at 25 mg daily.  Patient was staying at [facility] rehab facility.  Since his last visit, patient has been discharged (on 11/2) and is living at home.    Patient does not know any of his medications today patient states he forgot his list at home.  Patient is from [place].    For his symptoms, patient feels well.  Patient denies chest pain, palpitations orthopnea, PND, edema, dizziness/lightheadedness or shortness of breath at rest and with ADLs.  Patient does report he is not exerting himself much since being home.  Patient does use a walker or cane with ambulation.    Patient has not taking any home weights.  He does not have a scale.  Patient plans to borrow a scale from his brother.    Patient denies any shocks from his defibrillator.    Pt is unsure if he has seen the CT surgeon.    Additonally, patient has the following medical problems:    -Hospitalization from 9/24/18 to 10/5/18 for STEMI and 3 degree Heart Block.  Patient was taken to the Cath Lab and was found to have multivessel disease.  Patient had a thrombectomy, balloon angioplasty, balloon pump was placed and temporary pacemaker.    -ICD placed on 9/26/2018     -Diabetes     -Hypertension     -Leg surgery and cervical fusion    -Quit smoking in Sept 2018    -Pt does chew tobacco    Past Medical History:   Diagnosis Date   • Acute coronary syndrome  ([facility]) 9/24/2018   • Complete heart block by electrocardiogram ([facility]) 9/24/2018   • Diabetes     type II   • DM (diabetes mellitus) ([facility]) 9/24/2018   • HTN (hypertension), malignant 9/24/2018   • Rheumatic fever     1974   • Smoking 9/24/2018   • STEMI (ST elevation myocardial infarction) ([facility]) 9/24/2018     Past Surgical History:   Procedure Laterality Date   • AICD IMPLANT  09/26/2018   • CERVICAL DISK AND FUSION ANTERIOR  4/23/2010    Performed by ESTHER BELL at SURGERY [facility] ORS   • OTHER ORTHOPEDIC SURGERY  1975    right leg      Family History   Problem Relation Age of Onset   • Cancer Mother    • Heart Disease Brother         CABGx3     Social History     Social History   • Marital status: Single     Spouse name: N/A   • Number of children: N/A   • Years of education: N/A     Occupational History   • Not on file.     Social History Main Topics   • Smoking status: Former Smoker     Packs/day: 1.00     Years: 40.00     Types: Cigarettes     Quit date: 9/28/2018   • Smokeless tobacco: Former User     Types: Chew     Quit date: 10/16/2008      Comment: 1 pack isabel day and a half   • Alcohol use 7.2 oz/week     12 Cans of beer per week      Comment: 12 pack in a month   • Drug use: No   • Sexual activity: Not on file     Other Topics Concern   • Not on file     Social History Narrative   • No narrative on file     No Known Allergies  Outpatient Encounter Prescriptions as of 11/7/2018   Medication Sig Dispense Refill   • HYDROcodone/acetaminophen (NORCO)  MG Tab Take 1-2 Tabs by mouth every 6 hours as needed.     • metoprolol SR (TOPROL XL) 25 MG TABLET SR 24 HR Take 1 Tab by mouth every day. 30 Tab 11   • aspirin EC 81 MG EC tablet Take 1 Tab by mouth every day. 30 Tab    • nitroglycerin (NITROSTAT) 0.4 MG SL Tab Place 1 Tab under tongue as needed for Chest Pain (up to 3 doses (if SBP greater than 90 mmHg)). 25 Tab    • amiodarone (CORDARONE) 200 MG Tab Take 1 Tab by mouth every day. 30 Tab    •  "warfarin (COUMADIN) 7.5 MG Tab Take 1 Tab by mouth COUMADIN-DAILY. (Patient taking differently: Take 4 mg by mouth every day.) 30 Tab 3   • insulin glargine (LANTUS) 100 UNIT/ML Solution Inject 25 Units as instructed every evening. (Patient not taking: Reported on 10/16/2018) 10 mL    • insulin regular (HUMULIN R) 100 Unit/mL Solution Inject 3-14 Units as instructed 4 Times a Day,Before Meals and at Bedtime. (Patient not taking: Reported on 10/16/2018) 10 mL    • atorvastatin (LIPITOR) 80 MG tablet Take 1 Tab by mouth every evening. 30 Tab    • losartan (COZAAR) 25 MG Tab Take 3 Tabs by mouth every day. 30 Tab    • furosemide (LASIX) 20 MG Tab Take 1 Tab by mouth every day. 60 Tab    • spironolactone (ALDACTONE) 25 MG Tab Take 1 Tab by mouth every day. 30 Tab 3   • ibuprofen (MOTRIN) 200 MG Tab Take 400 mg by mouth 2 times a day as needed.       No facility-administered encounter medications on file as of 11/7/2018.      Review of Systems   Constitutional: Negative for fever and malaise/fatigue.   Respiratory: Negative for cough and shortness of breath.    Cardiovascular: Negative for chest pain, palpitations, orthopnea, claudication, leg swelling and PND.   Gastrointestinal: Negative for abdominal pain.   Musculoskeletal: Negative for myalgias.   Neurological: Negative for dizziness.   All other systems reviewed and are negative.       Objective:   /60 (BP Location: Right arm, Patient Position: Sitting, BP Cuff Size: Adult)   Pulse 72   Ht 1.778 m (5' 10\")   Wt 74.4 kg (164 lb)   SpO2 96%   BMI 23.53 kg/m²      Physical Exam   Constitutional: He is oriented to person, place, and time. He appears well-developed and well-nourished.   HENT:   Head: Normocephalic and atraumatic.   Eyes: Pupils are equal, round, and reactive to light. EOM are normal.   Neck: Normal range of motion. Neck supple. No JVD present.   Cardiovascular: Normal rate, regular rhythm and normal heart sounds.    Pulmonary/Chest: Effort " normal and breath sounds normal. No respiratory distress. He has no wheezes. He has no rales.   Left chest ICD-no erosion, drainage or Erythema, Steri-Strips in place   Abdominal: Soft. Bowel sounds are normal.   Musculoskeletal: He exhibits no edema.   Neurological: He is alert and oriented to person, place, and time.   Skin: Skin is warm and dry.   Psychiatric: He has a normal mood and affect. His behavior is normal.   Vitals reviewed.       Lab Results   Component Value Date/Time    CHOLSTRLTOT 128 09/25/2018 01:28 AM    LDL 85 09/25/2018 01:28 AM    HDL 24 (A) 09/25/2018 01:28 AM    TRIGLYCERIDE 94 09/25/2018 01:28 AM       Lab Results   Component Value Date/Time    SODIUM 134 (L) 10/05/2018 03:21 AM    POTASSIUM 4.1 10/05/2018 03:21 AM    CHLORIDE 97 10/05/2018 03:21 AM    CO2 28 10/05/2018 03:21 AM    GLUCOSE 127 (H) 10/05/2018 03:21 AM    BUN 17 10/05/2018 03:21 AM    CREATININE 0.72 10/05/2018 03:21 AM     Lab Results   Component Value Date/Time    ALKPHOSPHAT 76 09/29/2018 04:58 AM    ASTSGOT 32 09/29/2018 04:58 AM    ALTSGPT 77 (H) 09/29/2018 04:58 AM    TBILIRUBIN 1.2 09/29/2018 04:58 AM      Transthoracic Echo Report 9/24/2018  No prior study is available for comparison.   Severely reduced left ventricular systolic function. Left ventricular ejection fraction is visually estimated to be 15%.  Only preserved wall motion at the basal anterior wall segment.  No evidence of valvular abnormality based on Doppler evaluation.      Transthoracic Echo Report 9/26/2018  This is a limited echo done to r/o pericardial effusion. LV function remains moderately to severely depressed with regional wall motion abnormalities. There is a trace effusion without evidence of hemodynamic compromise.     Transthoracic Echo Report 9/27/2018  Prior echo 09/26/18, findings are similar  Requested as limited to eval for pericaridal effusion.  LV function about 20-25% visually.  Mild global hypokinesis with LAD territory severe hypo  to akinesis.  Trivial pericardial fluid noted.    Assessment:     1. ACC/AHA stage C systolic heart failure (HCC)  BASIC METABOLIC PANEL   2. Heart failure, NYHA class 2 (HCC)  BASIC METABOLIC PANEL   3. Automatic implantable cardioverter-defibrillator in situ     4. Ischemic cardiomyopathy     5. Coronary artery disease involving native coronary artery of native heart without angina pectoris     6. Smoking     7. Paroxysmal atrial fibrillation (HCC)     8. Type 2 diabetes mellitus with hyperosmolar coma, unspecified whether long term insulin use ([facility])         Medical Decision Making:  Today's Assessment / Status / Plan:   1. HFrEF, Stage C, Class 2, LVEF 20-25%: Based on physical examination findings, patient is euvolemic. No JVD, lungs are clear to auscultation, no pitting edema in bilateral lower extremities, no ascites.  -Discussed with patient the importance of taking prescribed medications and their role in his heart failure management.  Discussed with patient to bring in an updated medication list with him to each and every visit for any provider.  Patient verbalized understanding.  Patient or brother to verify med list and try to call in his medications  -Continue furosemide 20 mg daily  -Continue losartan 75 mg daily  -Continue Toprol-XL 25 mg daily  -Continue spironolactone 25 mg daily  -Obtain BMP in 1 month  -Has ICD, device check today due to patient living out of town  -Reinforced s/sx of worsening heart failure with patient and weight monitoring. Pt verbalizes understanding. Pt to call office or RTC if present.   -Encouraged continued smoking cessation and cessation of chewing tobacco use    2.  CAD, s/p thrombectomy and balloon angioplasty, needs CABG:  -Continue aspirin 81 mg daily  -Continue atorvastatin 80 mg daily  -Surgeon office called to see if patient had a follow-up.  Patient is unsure.  Surgeon office to contact patient tomorrow and inform if he has been seen.  Patient also given contact  information to schedule appointment if started office does not call    3.  Paroxysmal A. Fib:  -Continue warfarin, patient has a follow-up with his PCP on Friday and encouraged to have his INR checked and set up INR checks  -Continue amiodarone 200 mg daily    4.  Diabetes:  -Follow-up with PCP    FU in clinic in 1 month with heart failure Doctor. Sooner if needed.    Patient verbalizes understanding and agrees with the plan of care.     Collaborating MD: MD Apolinar Gastelum M.D.  [address] [place] [id number]  VIA Facsimile: [phone]

## 2018-11-07 NOTE — PROGRESS NOTES
Chief Complaint   Patient presents with   • CHF (Acute)     HFE       Subjective:   Nisa Ross is a 58 y.o. male who presents today for follow-up on his heart failure with his brother, Dannie.    Patient of the heart failure clinic.  Patient was last seen in clinic on 10/16/2018.  During his last visit, Toprol-XL was started at 25 mg daily.  Patient was staying at [facility] rehab facility.  Since his last visit, patient has been discharged (on 11/2) and is living at home.    Patient does not know any of his medications today patient states he forgot his list at home.  Patient is from [place].    For his symptoms, patient feels well.  Patient denies chest pain, palpitations orthopnea, PND, edema, dizziness/lightheadedness or shortness of breath at rest and with ADLs.  Patient does report he is not exerting himself much since being home.  Patient does use a walker or cane with ambulation.    Patient has not taking any home weights.  He does not have a scale.  Patient plans to borrow a scale from his brother.    Patient denies any shocks from his defibrillator.    Pt is unsure if he has seen the CT surgeon.    Additonally, patient has the following medical problems:    -Hospitalization from 9/24/18 to 10/5/18 for STEMI and 3 degree Heart Block.  Patient was taken to the Cath Lab and was found to have multivessel disease.  Patient had a thrombectomy, balloon angioplasty, balloon pump was placed and temporary pacemaker.    -ICD placed on 9/26/2018     -Diabetes     -Hypertension     -Leg surgery and cervical fusion    -Quit smoking in Sept 2018    -Pt does chew tobacco    Past Medical History:   Diagnosis Date   • Acute coronary syndrome (HCC) 9/24/2018   • Complete heart block by electrocardiogram ([facility]) 9/24/2018   • Diabetes     type II   • DM (diabetes mellitus) ([facility]) 9/24/2018   • HTN (hypertension), malignant 9/24/2018   • Rheumatic fever     1974   • Smoking 9/24/2018   • STEMI (ST elevation  myocardial infarction) (HCC) 9/24/2018     Past Surgical History:   Procedure Laterality Date   • AICD IMPLANT  09/26/2018   • CERVICAL DISK AND FUSION ANTERIOR  4/23/2010    Performed by ESTHER BELL at SURGERY [facility] ORS   • OTHER ORTHOPEDIC SURGERY  1975    right leg      Family History   Problem Relation Age of Onset   • Cancer Mother    • Heart Disease Brother         CABGx3     Social History     Social History   • Marital status: Single     Spouse name: N/A   • Number of children: N/A   • Years of education: N/A     Occupational History   • Not on file.     Social History Main Topics   • Smoking status: Former Smoker     Packs/day: 1.00     Years: 40.00     Types: Cigarettes     Quit date: 9/28/2018   • Smokeless tobacco: Former User     Types: Chew     Quit date: 10/16/2008      Comment: 1 pack isabel day and a half   • Alcohol use 7.2 oz/week     12 Cans of beer per week      Comment: 12 pack in a month   • Drug use: No   • Sexual activity: Not on file     Other Topics Concern   • Not on file     Social History Narrative   • No narrative on file     No Known Allergies  Outpatient Encounter Prescriptions as of 11/7/2018   Medication Sig Dispense Refill   • HYDROcodone/acetaminophen (NORCO)  MG Tab Take 1-2 Tabs by mouth every 6 hours as needed.     • metoprolol SR (TOPROL XL) 25 MG TABLET SR 24 HR Take 1 Tab by mouth every day. 30 Tab 11   • aspirin EC 81 MG EC tablet Take 1 Tab by mouth every day. 30 Tab    • nitroglycerin (NITROSTAT) 0.4 MG SL Tab Place 1 Tab under tongue as needed for Chest Pain (up to 3 doses (if SBP greater than 90 mmHg)). 25 Tab    • amiodarone (CORDARONE) 200 MG Tab Take 1 Tab by mouth every day. 30 Tab    • warfarin (COUMADIN) 7.5 MG Tab Take 1 Tab by mouth COUMADIN-DAILY. (Patient taking differently: Take 4 mg by mouth every day.) 30 Tab 3   • insulin glargine (LANTUS) 100 UNIT/ML Solution Inject 25 Units as instructed every evening. (Patient not taking: Reported on  "10/16/2018) 10 mL    • insulin regular (HUMULIN R) 100 Unit/mL Solution Inject 3-14 Units as instructed 4 Times a Day,Before Meals and at Bedtime. (Patient not taking: Reported on 10/16/2018) 10 mL    • atorvastatin (LIPITOR) 80 MG tablet Take 1 Tab by mouth every evening. 30 Tab    • losartan (COZAAR) 25 MG Tab Take 3 Tabs by mouth every day. 30 Tab    • furosemide (LASIX) 20 MG Tab Take 1 Tab by mouth every day. 60 Tab    • spironolactone (ALDACTONE) 25 MG Tab Take 1 Tab by mouth every day. 30 Tab 3   • ibuprofen (MOTRIN) 200 MG Tab Take 400 mg by mouth 2 times a day as needed.       No facility-administered encounter medications on file as of 11/7/2018.      Review of Systems   Constitutional: Negative for fever and malaise/fatigue.   Respiratory: Negative for cough and shortness of breath.    Cardiovascular: Negative for chest pain, palpitations, orthopnea, claudication, leg swelling and PND.   Gastrointestinal: Negative for abdominal pain.   Musculoskeletal: Negative for myalgias.   Neurological: Negative for dizziness.   All other systems reviewed and are negative.       Objective:   /60 (BP Location: Right arm, Patient Position: Sitting, BP Cuff Size: Adult)   Pulse 72   Ht 1.778 m (5' 10\")   Wt 74.4 kg (164 lb)   SpO2 96%   BMI 23.53 kg/m²     Physical Exam   Constitutional: He is oriented to person, place, and time. He appears well-developed and well-nourished.   HENT:   Head: Normocephalic and atraumatic.   Eyes: Pupils are equal, round, and reactive to light. EOM are normal.   Neck: Normal range of motion. Neck supple. No JVD present.   Cardiovascular: Normal rate, regular rhythm and normal heart sounds.    Pulmonary/Chest: Effort normal and breath sounds normal. No respiratory distress. He has no wheezes. He has no rales.   Left chest ICD-no erosion, drainage or Erythema, Steri-Strips in place   Abdominal: Soft. Bowel sounds are normal.   Musculoskeletal: He exhibits no edema.   Neurological: " He is alert and oriented to person, place, and time.   Skin: Skin is warm and dry.   Psychiatric: He has a normal mood and affect. His behavior is normal.   Vitals reviewed.       Lab Results   Component Value Date/Time    CHOLSTRLTOT 128 09/25/2018 01:28 AM    LDL 85 09/25/2018 01:28 AM    HDL 24 (A) 09/25/2018 01:28 AM    TRIGLYCERIDE 94 09/25/2018 01:28 AM       Lab Results   Component Value Date/Time    SODIUM 134 (L) 10/05/2018 03:21 AM    POTASSIUM 4.1 10/05/2018 03:21 AM    CHLORIDE 97 10/05/2018 03:21 AM    CO2 28 10/05/2018 03:21 AM    GLUCOSE 127 (H) 10/05/2018 03:21 AM    BUN 17 10/05/2018 03:21 AM    CREATININE 0.72 10/05/2018 03:21 AM     Lab Results   Component Value Date/Time    ALKPHOSPHAT 76 09/29/2018 04:58 AM    ASTSGOT 32 09/29/2018 04:58 AM    ALTSGPT 77 (H) 09/29/2018 04:58 AM    TBILIRUBIN 1.2 09/29/2018 04:58 AM      Transthoracic Echo Report 9/24/2018  No prior study is available for comparison.   Severely reduced left ventricular systolic function. Left ventricular ejection fraction is visually estimated to be 15%.  Only preserved wall motion at the basal anterior wall segment.  No evidence of valvular abnormality based on Doppler evaluation.      Transthoracic Echo Report 9/26/2018  This is a limited echo done to r/o pericardial effusion. LV function remains moderately to severely depressed with regional wall motion abnormalities. There is a trace effusion without evidence of hemodynamic compromise.     Transthoracic Echo Report 9/27/2018  Prior echo 09/26/18, findings are similar  Requested as limited to eval for pericaridal effusion.  LV function about 20-25% visually.  Mild global hypokinesis with LAD territory severe hypo to akinesis.  Trivial pericardial fluid noted.    Assessment:     1. ACC/AHA stage C systolic heart failure (HCC)  BASIC METABOLIC PANEL   2. Heart failure, NYHA class 2 (HCC)  BASIC METABOLIC PANEL   3. Automatic implantable cardioverter-defibrillator in situ     4.  Ischemic cardiomyopathy     5. Coronary artery disease involving native coronary artery of native heart without angina pectoris     6. Smoking     7. Paroxysmal atrial fibrillation (HCC)     8. Type 2 diabetes mellitus with hyperosmolar coma, unspecified whether long term insulin use (HCC)         Medical Decision Making:  Today's Assessment / Status / Plan:   1. HFrEF, Stage C, Class 2, LVEF 20-25%: Based on physical examination findings, patient is euvolemic. No JVD, lungs are clear to auscultation, no pitting edema in bilateral lower extremities, no ascites.  -Discussed with patient the importance of taking prescribed medications and their role in his heart failure management.  Discussed with patient to bring in an updated medication list with him to each and every visit for any provider.  Patient verbalized understanding.  Patient or brother to verify med list and try to call in his medications  -Continue furosemide 20 mg daily  -Continue losartan 75 mg daily  -Continue Toprol-XL 25 mg daily  -Continue spironolactone 25 mg daily  -Obtain BMP in 1 month  -Has ICD, device check today due to patient living out of town  -Reinforced s/sx of worsening heart failure with patient and weight monitoring. Pt verbalizes understanding. Pt to call office or RTC if present.   -Encouraged continued smoking cessation and cessation of chewing tobacco use    2.  CAD, s/p thrombectomy and balloon angioplasty, needs CABG:  -Continue aspirin 81 mg daily  -Continue atorvastatin 80 mg daily  -Surgeon office called to see if patient had a follow-up.  Patient is unsure.  Surgeon office to contact patient tomorrow and inform if he has been seen.  Patient also given contact information to schedule appointment if started office does not call    3.  Paroxysmal A. Fib:  -Continue warfarin, patient has a follow-up with his PCP on Friday and encouraged to have his INR checked and set up INR checks  -Continue amiodarone 200 mg daily    4.   Diabetes:  -Follow-up with PCP    FU in clinic in 1 month with heart failure Doctor. Sooner if needed.    Patient verbalizes understanding and agrees with the plan of care.     Collaborating MD: Allan Oliver MD

## 2018-11-08 NOTE — PATIENT INSTRUCTIONS
Schedule appointment with:  Rocky Hightower M.D.  [address]  [place] [address]  [phone]    BRING UPDATED MEDICATION LIST TO EVERY VISIT     Follow up with Dr. Ruiz and see if he can follow Warfarin and INRs

## 2018-11-08 NOTE — NON-PROVIDER
Final testing today. Appropriate device function demonstrated. Wound site appears clear. Will order remote monitor.

## 2018-11-12 ENCOUNTER — TELEPHONE (OUTPATIENT)
Dept: CARDIOLOGY | Facility: MEDICAL CENTER | Age: 58
End: 2018-11-12

## 2018-11-12 NOTE — TELEPHONE ENCOUNTER
----- Message from Debi [place] sent at 11/12/2018 11:18 AM PST -----  Regarding: sending a fax to be discussed with ROLDAN  Contact: [phone]  ROLDAN/sadie Hutchison from [facility] calling to discuss the fax she   Is faxing us now, which I will bring to you hard copy as soon as it arrives.  Please call Kianna at [phone]

## 2018-11-12 NOTE — TELEPHONE ENCOUNTER
Pt. Had FV 11-7-18 with Radha. Pt. Had follow-up appointment with Dr. Hightower on 10-18-18 but apparently that OV dictation was not available for her review.  Dr. Hightower saw pt and determined that he is non-surgical and needs a repeat C with stent therapy.

## 2018-11-12 NOTE — TELEPHONE ENCOUNTER
Per Radha: schedule pt. For repeat heart cath per Dr. Hightower' recommendations.  Left message for pt. Or brother to call.  Note forwarded to heart cath .

## 2018-11-13 NOTE — TELEPHONE ENCOUNTER
Patient is scheduled on 11-30-18 for a LHCw/poss with . Patient was told to hold coumadin for 5 days prior and to hold lasix am day of procedure. Pt was told to cut insulin in half night before and hold am day of procedure. Patient to check in at 9:00 for an 11:00 procedure. H&P was done on 11-7-18 by Radha Fang. Pre admit to call patient due to him living out of town.

## 2018-11-30 ENCOUNTER — HOSPITAL ENCOUNTER (OUTPATIENT)
Facility: MEDICAL CENTER | Age: 58
End: 2018-12-01
Attending: INTERNAL MEDICINE | Admitting: INTERNAL MEDICINE
Payer: MEDICARE

## 2018-11-30 ENCOUNTER — APPOINTMENT (OUTPATIENT)
Dept: RADIOLOGY | Facility: MEDICAL CENTER | Age: 58
End: 2018-11-30
Attending: INTERNAL MEDICINE
Payer: MEDICARE

## 2018-11-30 DIAGNOSIS — I25.10 CORONARY ARTERY DISEASE INVOLVING NATIVE CORONARY ARTERY OF NATIVE HEART WITHOUT ANGINA PECTORIS: ICD-10-CM

## 2018-11-30 LAB
ALBUMIN SERPL BCP-MCNC: 3.9 G/DL (ref 3.2–4.9)
ALBUMIN/GLOB SERPL: 1.3 G/DL
ALP SERPL-CCNC: 73 U/L (ref 30–99)
ALT SERPL-CCNC: 11 U/L (ref 2–50)
ANION GAP SERPL CALC-SCNC: 10 MMOL/L (ref 0–11.9)
APTT PPP: 26.9 SEC (ref 24.7–36)
AST SERPL-CCNC: 13 U/L (ref 12–45)
BILIRUB SERPL-MCNC: 1.3 MG/DL (ref 0.1–1.5)
BUN SERPL-MCNC: 15 MG/DL (ref 8–22)
CALCIUM SERPL-MCNC: 9 MG/DL (ref 8.5–10.5)
CHLORIDE SERPL-SCNC: 105 MMOL/L (ref 96–112)
CO2 SERPL-SCNC: 24 MMOL/L (ref 20–33)
CREAT SERPL-MCNC: 0.74 MG/DL (ref 0.5–1.4)
EKG IMPRESSION: NORMAL
ERYTHROCYTE [DISTWIDTH] IN BLOOD BY AUTOMATED COUNT: 56.3 FL (ref 35.9–50)
GLOBULIN SER CALC-MCNC: 2.9 G/DL (ref 1.9–3.5)
GLUCOSE SERPL-MCNC: 289 MG/DL (ref 65–99)
HCT VFR BLD AUTO: 38.9 % (ref 42–52)
HGB BLD-MCNC: 12.3 G/DL (ref 14–18)
INR PPP: 1.04 (ref 0.87–1.13)
MCH RBC QN AUTO: 28.1 PG (ref 27–33)
MCHC RBC AUTO-ENTMCNC: 31.6 G/DL (ref 33.7–35.3)
MCV RBC AUTO: 89 FL (ref 81.4–97.8)
PLATELET # BLD AUTO: 206 K/UL (ref 164–446)
PMV BLD AUTO: 10.8 FL (ref 9–12.9)
POTASSIUM SERPL-SCNC: 4.3 MMOL/L (ref 3.6–5.5)
PROT SERPL-MCNC: 6.8 G/DL (ref 6–8.2)
PROTHROMBIN TIME: 13.7 SEC (ref 12–14.6)
RBC # BLD AUTO: 4.37 M/UL (ref 4.7–6.1)
SODIUM SERPL-SCNC: 139 MMOL/L (ref 135–145)
WBC # BLD AUTO: 8.6 K/UL (ref 4.8–10.8)

## 2018-11-30 PROCEDURE — 85730 THROMBOPLASTIN TIME PARTIAL: CPT

## 2018-11-30 PROCEDURE — 93458 L HRT ARTERY/VENTRICLE ANGIO: CPT | Mod: 26 | Performed by: INTERNAL MEDICINE

## 2018-11-30 PROCEDURE — 700105 HCHG RX REV CODE 258: Performed by: INTERNAL MEDICINE

## 2018-11-30 PROCEDURE — 85027 COMPLETE CBC AUTOMATED: CPT

## 2018-11-30 PROCEDURE — 99153 MOD SED SAME PHYS/QHP EA: CPT

## 2018-11-30 PROCEDURE — 85610 PROTHROMBIN TIME: CPT

## 2018-11-30 PROCEDURE — 93005 ELECTROCARDIOGRAM TRACING: CPT | Performed by: INTERNAL MEDICINE

## 2018-11-30 PROCEDURE — 360979 HCHG DIAGNOSTIC CATH

## 2018-11-30 PROCEDURE — 80053 COMPREHEN METABOLIC PANEL: CPT

## 2018-11-30 PROCEDURE — G0378 HOSPITAL OBSERVATION PER HR: HCPCS

## 2018-11-30 PROCEDURE — 700111 HCHG RX REV CODE 636 W/ 250 OVERRIDE (IP)

## 2018-11-30 PROCEDURE — 71046 X-RAY EXAM CHEST 2 VIEWS: CPT

## 2018-11-30 PROCEDURE — 99152 MOD SED SAME PHYS/QHP 5/>YRS: CPT

## 2018-11-30 PROCEDURE — 160002 HCHG RECOVERY MINUTES (STAT)

## 2018-11-30 PROCEDURE — C1894 INTRO/SHEATH, NON-LASER: HCPCS

## 2018-11-30 PROCEDURE — 700101 HCHG RX REV CODE 250

## 2018-11-30 PROCEDURE — 700102 HCHG RX REV CODE 250 W/ 637 OVERRIDE(OP): Performed by: NURSE PRACTITIONER

## 2018-11-30 PROCEDURE — 93458 L HRT ARTERY/VENTRICLE ANGIO: CPT

## 2018-11-30 PROCEDURE — 93010 ELECTROCARDIOGRAM REPORT: CPT | Performed by: INTERNAL MEDICINE

## 2018-11-30 PROCEDURE — 304952 HCHG R 2 PADS

## 2018-11-30 PROCEDURE — A9270 NON-COVERED ITEM OR SERVICE: HCPCS | Performed by: NURSE PRACTITIONER

## 2018-11-30 PROCEDURE — C1769 GUIDE WIRE: HCPCS

## 2018-11-30 RX ORDER — ATORVASTATIN CALCIUM 80 MG/1
80 TABLET, FILM COATED ORAL EVERY EVENING
Status: DISCONTINUED | OUTPATIENT
Start: 2018-11-30 | End: 2018-12-01 | Stop reason: HOSPADM

## 2018-11-30 RX ORDER — HEPARIN SODIUM,PORCINE 1000/ML
VIAL (ML) INJECTION
Status: COMPLETED
Start: 2018-11-30 | End: 2018-11-30

## 2018-11-30 RX ORDER — VERAPAMIL HYDROCHLORIDE 2.5 MG/ML
INJECTION, SOLUTION INTRAVENOUS
Status: COMPLETED
Start: 2018-11-30 | End: 2018-11-30

## 2018-11-30 RX ORDER — LIDOCAINE HYDROCHLORIDE 20 MG/ML
INJECTION, SOLUTION INFILTRATION; PERINEURAL
Status: COMPLETED
Start: 2018-11-30 | End: 2018-11-30

## 2018-11-30 RX ORDER — FUROSEMIDE 20 MG/1
20 TABLET ORAL DAILY
Status: DISCONTINUED | OUTPATIENT
Start: 2018-12-01 | End: 2018-12-01 | Stop reason: HOSPADM

## 2018-11-30 RX ORDER — SPIRONOLACTONE 25 MG/1
25 TABLET ORAL DAILY
Status: DISCONTINUED | OUTPATIENT
Start: 2018-11-30 | End: 2018-12-01 | Stop reason: HOSPADM

## 2018-11-30 RX ORDER — WARFARIN SODIUM 6 MG/1
6 TABLET ORAL
Status: COMPLETED | OUTPATIENT
Start: 2018-11-30 | End: 2018-11-30

## 2018-11-30 RX ORDER — AMIODARONE HYDROCHLORIDE 200 MG/1
200 TABLET ORAL DAILY
Status: DISCONTINUED | OUTPATIENT
Start: 2018-11-30 | End: 2018-12-01 | Stop reason: HOSPADM

## 2018-11-30 RX ORDER — METOPROLOL SUCCINATE 25 MG/1
25 TABLET, EXTENDED RELEASE ORAL DAILY
Status: DISCONTINUED | OUTPATIENT
Start: 2018-11-30 | End: 2018-12-01 | Stop reason: HOSPADM

## 2018-11-30 RX ORDER — WARFARIN SODIUM 4 MG/1
4 TABLET ORAL
Status: DISCONTINUED | OUTPATIENT
Start: 2018-12-01 | End: 2018-12-01 | Stop reason: HOSPADM

## 2018-11-30 RX ORDER — SODIUM CHLORIDE 9 MG/ML
INJECTION, SOLUTION INTRAVENOUS CONTINUOUS
Status: DISCONTINUED | OUTPATIENT
Start: 2018-11-30 | End: 2018-12-01 | Stop reason: HOSPADM

## 2018-11-30 RX ORDER — ASPIRIN 81 MG/1
81 TABLET, CHEWABLE ORAL DAILY
Status: DISCONTINUED | OUTPATIENT
Start: 2018-11-30 | End: 2018-12-01 | Stop reason: HOSPADM

## 2018-11-30 RX ORDER — MIDAZOLAM HYDROCHLORIDE 1 MG/ML
INJECTION INTRAMUSCULAR; INTRAVENOUS
Status: COMPLETED
Start: 2018-11-30 | End: 2018-11-30

## 2018-11-30 RX ADMIN — SODIUM CHLORIDE: 9 INJECTION, SOLUTION INTRAVENOUS at 18:19

## 2018-11-30 RX ADMIN — LOSARTAN POTASSIUM 75 MG: 25 TABLET, FILM COATED ORAL at 18:18

## 2018-11-30 RX ADMIN — ATORVASTATIN CALCIUM 80 MG: 80 TABLET, FILM COATED ORAL at 18:17

## 2018-11-30 RX ADMIN — WARFARIN SODIUM 6 MG: 6 TABLET ORAL at 18:18

## 2018-11-30 RX ADMIN — ASPIRIN 81 MG: 81 TABLET, CHEWABLE ORAL at 18:17

## 2018-11-30 RX ADMIN — METOPROLOL SUCCINATE 25 MG: 25 TABLET, EXTENDED RELEASE ORAL at 18:18

## 2018-11-30 RX ADMIN — FENTANYL CITRATE 50 MCG: 50 INJECTION, SOLUTION INTRAMUSCULAR; INTRAVENOUS at 12:47

## 2018-11-30 RX ADMIN — MIDAZOLAM HYDROCHLORIDE 1 MG: 1 INJECTION, SOLUTION INTRAMUSCULAR; INTRAVENOUS at 12:47

## 2018-11-30 RX ADMIN — HEPARIN SODIUM: 1000 INJECTION, SOLUTION INTRAVENOUS; SUBCUTANEOUS at 12:26

## 2018-11-30 RX ADMIN — HEPARIN SODIUM 2000 UNITS: 200 INJECTION, SOLUTION INTRAVENOUS at 12:26

## 2018-11-30 RX ADMIN — AMIODARONE HYDROCHLORIDE 200 MG: 200 TABLET ORAL at 18:17

## 2018-11-30 RX ADMIN — LIDOCAINE HYDROCHLORIDE: 20 INJECTION, SOLUTION INFILTRATION; PERINEURAL at 12:26

## 2018-11-30 RX ADMIN — NITROGLYCERIN 10 ML: 20 INJECTION INTRAVENOUS at 12:26

## 2018-11-30 RX ADMIN — SPIRONOLACTONE 25 MG: 25 TABLET ORAL at 18:18

## 2018-11-30 ASSESSMENT — COGNITIVE AND FUNCTIONAL STATUS - GENERAL
SUGGESTED CMS G CODE MODIFIER MOBILITY: CH
MOBILITY SCORE: 24
SUGGESTED CMS G CODE MODIFIER DAILY ACTIVITY: CH
DAILY ACTIVITIY SCORE: 24

## 2018-11-30 ASSESSMENT — PAIN SCALES - GENERAL
PAINLEVEL_OUTOF10: 0

## 2018-11-30 ASSESSMENT — PATIENT HEALTH QUESTIONNAIRE - PHQ9
2. FEELING DOWN, DEPRESSED, IRRITABLE, OR HOPELESS: NOT AT ALL
1. LITTLE INTEREST OR PLEASURE IN DOING THINGS: NOT AT ALL
SUM OF ALL RESPONSES TO PHQ9 QUESTIONS 1 AND 2: 0

## 2018-11-30 ASSESSMENT — LIFESTYLE VARIABLES
EVER_SMOKED: YES
ALCOHOL_USE: NO

## 2018-11-30 ASSESSMENT — COPD QUESTIONNAIRES
DURING THE PAST 4 WEEKS HOW MUCH DID YOU FEEL SHORT OF BREATH: NONE/LITTLE OF THE TIME
HAVE YOU SMOKED AT LEAST 100 CIGARETTES IN YOUR ENTIRE LIFE: YES
COPD SCREENING SCORE: 3
IN THE PAST 12 MONTHS DO YOU DO LESS THAN YOU USED TO BECAUSE OF YOUR BREATHING PROBLEMS: DISAGREE/UNSURE
DO YOU EVER COUGH UP ANY MUCUS OR PHLEGM?: NO/ONLY WITH OCCASIONAL COLDS OR INFECTIONS

## 2018-11-30 NOTE — OR NURSING
"Contact information retrieved from pt's brother, Dannie, who states, \"I'm the , today.\" Contact number placed on chart [phone].  "

## 2018-11-30 NOTE — DISCHARGE INSTRUCTIONS
Discharge Instructions    Discharged to home by car with relative. Discharged via wheelchair, hospital escort: Yes.  Special equipment needed: Not Applicable    Be sure to schedule a follow-up appointment with your primary care doctor or any specialists as instructed.     Discharge Plan:   Diet Plan: Discussed  Activity Level: Discussed  Smoking Cessation Offered: Patient Refused  Confirmed Follow up Appointment: Appointment Scheduled  Confirmed Symptoms Management: Discussed  Medication Reconciliation Updated: Yes  Influenza Vaccine Indication: Not indicated: Previously immunized this influenza season and > 8 years of age    I understand that a diet low in cholesterol, fat, and sodium is recommended for good health. Unless I have been given specific instructions below for another diet, I accept this instruction as my diet prescription.   Other diet: Cardiac/Diabetic    Special Instructions:   [facility] Cardiac Surgery Discharge Instructions    Activity:  1. NO driving for 4 weeks after surgery. You may ride as a passenger.  2. NO lifting of any item over 10 lbs (e.g. gallon of milk) for 6 weeks after surgery.  3. Walk as much as possible! Walk a minimum of once a day. Depending on your fatigue and comfort level, you may walk as much as you wish. There is no maximum.  4. Other physical activities (sex, housework, gardening, etc.) are OK after 4 weeks or after 8 weeks if you want to golf (start by putting, then advance to chipping, then to driving).  5. Continue using incentive spirometer for 2 weeks, especially if going home on oxygen.  6. Weigh daily and write it down starting  the next morning after you arrive home. Call your doctor for a weight gain of 2-4 lbs in 1-2 days.    Incision Care:  1. SHOWER ONLY - no baths. Clean incision(s) daily with plain soap or any other dye or perfume free soap. Then pat incision(s) dry with clean towel. Avoid creams or lotions on the incision(s).  2. If there is  any increase in redness or swelling, or separation of the incision line, or thick drainage* from any of the incisions, call [facility] ([phone]). * Clear, thin drainage is not abnormal especially from the leg incision and/or chest tube sites.                    3. Continue to wear your LOW Stockings for 4 weeks on the leg(s) with the incision(s) or swelling. You may take off the stocking(s) when in bed or when the leg(s) are elevated.    Prescription Refills/Questions: Call your usual Pharmacy for ALL refills   (Remember that refills may require additional physician approval and will need at least 24 hours' notice. Please call for refills on Mondays through Fridays from 9 am to 4 pm).    1. Pain medication questions only: Call [facility] ([phone]).  2. For all other medications (except pain medication): Call your Cardiology Group or Primary Care Doctor (not [facility]) for refills or questions.    General Instructions:  1. If you are on the blood thinner Coumadin (warfarin), you will need your coumadin levels checked periodically. For any questions about scheduling, ask your Cardiology Group or your Home Health Nurse whether this has been arranged for you.     2. Cardiac Rehab is highly recommended. If you do not have an appointment at the time of discharge call Matt @ [phone] to schedule your initial interview.      3. Your Primary Care Doctor typically handles Home Oxygen. The Home Oxygen service that drops off your tanks should be checking your oxygen saturation levels. Oxygen may be stopped when you are > 90 % saturated on room air. Check with your Primary Care Doctor if you are unsure.    [facility] IS ALWAYS AVAILABLE TO ANSWER YOUR QUESTIONS. DO NOT HESITATE TO CALL!      · Is patient discharged on Warfarin / Coumadin?   Yes    You are receiving the drug warfarin. Please understand the importance of monitoring warfarin with scheduled PT/INR blood  "draws.  Follow-up with a call to your personal Doctor's office in 3 days to schedule a PT/INR. .    IMPORTANT: HOW TO USE THIS INFORMATION:  This is a summary and does NOT have all possible information about this product. This information does not assure that this product is safe, effective, or appropriate for you. This information is not individual medical advice and does not substitute for the advice of your health care professional. Always ask your health care professional for complete information about this product and your specific health needs.      WARFARIN - ORAL (WARF-uh-rin)      COMMON BRAND NAME(S): Coumadin      WARNING:  Warfarin can cause very serious (possibly fatal) bleeding. This is more likely to occur when you first start taking this medication or if you take too much warfarin. To decrease your risk for bleeding, your doctor or other health care provider will monitor you closely and check your lab results (INR test) to make sure you are not taking too much warfarin. Keep all medical and laboratory appointments. Tell your doctor right away if you notice any signs of serious bleeding. See also Side Effects section.      USES:  This medication is used to treat blood clots (such as in deep vein thrombosis-DVT or pulmonary embolus-PE) and/or to prevent new clots from forming in your body. Preventing harmful blood clots helps to reduce the risk of a stroke or heart attack. Conditions that increase your risk of developing blood clots include a certain type of irregular heart rhythm (atrial fibrillation), heart valve replacement, recent heart attack, and certain surgeries (such as hip/knee replacement). Warfarin is commonly called a \"blood thinner,\" but the more correct term is \"anticoagulant.\" It helps to keep blood flowing smoothly in your body by decreasing the amount of certain substances (clotting proteins) in your blood.      HOW TO USE:  Read the Medication Guide provided by your pharmacist before " you start taking warfarin and each time you get a refill. If you have any questions, ask your doctor or pharmacist. Take this medication by mouth with or without food as directed by your doctor or other health care professional, usually once a day. It is very important to take it exactly as directed. Do not increase the dose, take it more frequently, or stop using it unless directed by your doctor. Dosage is based on your medical condition, laboratory tests (such as INR), and response to treatment. Your doctor or other health care provider will monitor you closely while you are taking this medication to determine the right dose for you. Use this medication regularly to get the most benefit from it. To help you remember, take it at the same time each day. It is important to eat a balanced, consistent diet while taking warfarin. Some foods can affect how warfarin works in your body and may affect your treatment and dose. Avoid sudden large increases or decreases in your intake of foods high in vitamin K (such as broccoli, cauliflower, cabbage, brussels sprouts, kale, spinach, and other green leafy vegetables, liver, green tea, certain vitamin supplements). If you are trying to lose weight, check with your doctor before you try to go on a diet. Cranberry products may also affect how your warfarin works. Limit the amount of cranberry juice (16 ounces/480 milliliters a day) or other cranberry products you may drink or eat.      SIDE EFFECTS:  Nausea, loss of appetite, or stomach/abdominal pain may occur. If any of these effects persist or worsen, tell your doctor or pharmacist promptly. Remember that your doctor has prescribed this medication because he or she has judged that the benefit to you is greater than the risk of side effects. Many people using this medication do not have serious side effects. This medication can cause serious bleeding if it affects your blood clotting proteins too much (shown by unusually high  INR lab results). Even if your doctor stops your medication, this risk of bleeding can continue for up to a week. Tell your doctor right away if you have any signs of serious bleeding, including: unusual pain/swelling/discomfort, unusual/easy bruising, prolonged bleeding from cuts or gums, persistent/frequent nosebleeds, unusually heavy/prolonged menstrual flow, pink/dark urine, coughing up blood, vomit that is bloody or looks like coffee grounds, severe headache, dizziness/fainting, unusual or persistent tiredness/weakness, bloody/black/tarry stools, chest pain, shortness of breath, difficulty swallowing. Tell your doctor right away if any of these unlikely but serious side effects occur: persistent nausea/vomiting, severe stomach/abdominal pain, yellowing eyes/skin. This drug rarely has caused very serious (possibly fatal) problems if its effects lead to small blood clots (usually at the beginning of treatment). This can lead to severe skin/tissue damage that may require surgery or amputation if left untreated. Patients with certain blood conditions (protein C or S deficiency) may be at greater risk. Get medical help right away if any of these rare but serious side effects occur: painful/red/purplish patches on the skin (such as on the toe, breast, abdomen), change in the amount of urine, vision changes, confusion, slurred speech, weakness on one side of the body. A very serious allergic reaction to this drug is rare. However, get medical help right away if you notice any symptoms of a serious allergic reaction, including: rash, itching/swelling (especially of the face/tongue/throat), severe dizziness, trouble breathing. This is not a complete list of possible side effects. If you notice other effects not listed above, contact your doctor or pharmacist. In the [place] - Call your doctor for medical advice about side effects. You may report side effects to FDA at 4-206-FDA-9885. In [place] - Call your doctor for medical  advice about side effects. You may report side effects to [facility] [place] at [phone].      PRECAUTIONS:  Before taking warfarin, tell your doctor or pharmacist if you are allergic to it; or if you have any other allergies. This product may contain inactive ingredients, which can cause allergic reactions or other problems. Talk to your pharmacist for more details. Before using this medication, tell your doctor or pharmacist your medical history, especially of: blood disorders (such as anemia, hemophilia), bleeding problems (such as bleeding of the stomach/intestines, bleeding in the brain), blood vessel disorders (such as aneurysms), recent major injury/surgery, liver disease, alcohol use, mental/mood disorders (including memory problems), frequent falls/injuries. It is important that all your doctors and dentists know that you take warfarin. Before having surgery or any medical/dental procedures, tell your doctor or dentist that you are taking this medication and about all the products you use (including prescription drugs, nonprescription drugs, and herbal products). Avoid getting injections into the muscles. If you must have an injection into a muscle (for example, a flu shot), it should be given in the arm. This way, it will be easier to check for bleeding and/or apply pressure bandages. This medication may cause stomach bleeding. Daily use of alcohol while using this medicine will increase your risk for stomach bleeding and may also affect how this medication works. Limit or avoid alcoholic beverages. If you have not been eating well, if you have an illness or infection that causes fever, vomiting, or diarrhea for more than 2 days, or if you start using any antibiotic medications, contact your doctor or pharmacist immediately because these conditions can affect how warfarin works. This medication can cause heavy bleeding. To lower the chance of getting cut, bruised, or injured, use great caution with sharp  objects like safety razors and nail cutters. Use an electric razor when shaving and a soft toothbrush when brushing your teeth. Avoid activities such as contact sports. If you fall or injure yourself, especially if you hit your head, call your doctor immediately. Your doctor may need to check you. The Food & Drug Administration has stated that generic warfarin products are interchangeable. However, consult your doctor or pharmacist before switching warfarin products. Be careful not to take more than one medication that contains warfarin unless specifically directed by the doctor or health care provider who is monitoring your warfarin treatment. Older adults may be at greater risk for bleeding while using this drug. This medication is not recommended for use during pregnancy because of serious (possibly fatal) harm to an unborn baby. Discuss the use of reliable forms of birth control with your doctor. If you become pregnant or think you may be pregnant, tell your doctor immediately. If you are planning pregnancy, discuss a plan for managing your condition with your doctor before you become pregnant. Your doctor may switch the type of medication you use during pregnancy. Very small amounts of this medication may pass into breast milk but is unlikely to harm a nursing infant. Consult your doctor before breast-feeding.      DRUG INTERACTIONS:  Drug interactions may change how your medications work or increase your risk for serious side effects. This document does not contain all possible drug interactions. Keep a list of all the products you use (including prescription/nonprescription drugs and herbal products) and share it with your doctor and pharmacist. Do not start, stop, or change the dosage of any medicines without your doctor's approval. Warfarin interacts with many prescription, nonprescription, vitamin, and herbal products. This includes medications that are applied to the skin or inside the vagina or rectum.  "The interactions with warfarin usually result in an increase or decrease in the \"blood-thinning\" (anticoagulant) effect. Your doctor or other health care professional should closely monitor you to prevent serious bleeding or clotting problems. While taking warfarin, it is very important to tell your doctor or pharmacist of any changes in medications, vitamins, or herbal products that you are taking. Some products that may interact with this drug include: capecitabine, imatinib, mifepristone. Aspirin, aspirin-like drugs (salicylates), and nonsteroidal anti-inflammatory drugs (NSAIDs such as ibuprofen, naproxen, celecoxib) may have effects similar to warfarin. These drugs may increase the risk of bleeding problems if taken during treatment with warfarin. Carefully check all prescription/nonprescription product labels (including drugs applied to the skin such as pain-relieving creams) since the products may contain NSAIDs or salicylates. Talk to your doctor about using a different medication (such as acetaminophen) to treat pain/fever. Low-dose aspirin and related drugs (such as clopidogrel, ticlopidine) should be continued if prescribed by your doctor for specific medical reasons such as heart attack or stroke prevention. Consult your doctor or pharmacist for more details. Many herbal products interact with warfarin. Tell your doctor before taking any herbal products, especially bromelains, coenzyme Q10, cranberry, danshen, dong quai, fenugreek, garlic, ginkgo biloba, ginseng, and Dolores's wort, among others. This medication may interfere with a certain laboratory test to measure theophylline levels, possibly causing false test results. Make sure laboratory personnel and all your doctors know you use this drug.      OVERDOSE:  If overdose is suspected, contact a poison control center or emergency room immediately. [place] residents can call the [place] National Poison Hotline at 1-831.895.8286. [place] residents can call a " [facility] poison control center. Symptoms of overdose may include: bloody/black/tarry stools, pink/dark urine, unusual/prolonged bleeding.      NOTES:  Do not share this medication with others. Laboratory and/or medical tests (such as INR, complete blood count) must be performed periodically to monitor your progress or check for side effects. Consult your doctor for more details.      MISSED DOSE:  For the best possible benefit, do not miss any doses. If you do miss a dose and remember on the same day, take it as soon as you remember. If you remember on the next day, skip the missed dose and resume your usual dosing schedule. Do not double the dose to catch up because this could increase your risk for bleeding. Keep a record of missed doses to give to your doctor or pharmacist. Contact your doctor or pharmacist if you miss 2 or more doses in a row.      STORAGE:  Store at room temperature away from light and moisture. Do not store in the bathroom. Keep all medications away from children and pets. Do not flush medications down the toilet or pour them into a drain unless instructed to do so. Properly discard this product when it is  or no longer needed. Consult your pharmacist or local waste disposal company for more details about how to safely discard your product.      MEDICAL ALERT:  Your condition and medication can cause complications in a medical emergency. For information about enrolling in MedicAlert, call 1-538.956.7313 ([place]) or 1-773.172.3596 ([place]).      Information last revised 2010 Copyright(c) 2010 [employer]             Depression / Suicide Risk    As you are discharged from this [facility] facility, it is important to learn how to keep safe from harming yourself.    Recognize the warning signs:  · Abrupt changes in personality, positive or negative- including increase in energy   · Giving away possessions  · Change in eating patterns- significant weight changes-  positive  or negative  · Change in sleeping patterns- unable to sleep or sleeping all the time   · Unwillingness or inability to communicate  · Depression  · Unusual sadness, discouragement and loneliness  · Talk of wanting to die  · Neglect of personal appearance   · Rebelliousness- reckless behavior  · Withdrawal from people/activities they love  · Confusion- inability to concentrate     If you or a loved one observes any of these behaviors or has concerns about self-harm, here's what you can do:  · Talk about it- your feelings and reasons for harming yourself  · Remove any means that you might use to hurt yourself (examples: pills, rope, extension cords, firearm)  · Get professional help from the community (Mental Health, Substance Abuse, psychological counseling)  · Do not be alone:Call your Safe Contact- someone whom you trust who will be there for you.  · Call your local CRISIS HOTLINE 248-6139 or 561-954-3460  · Call your local Children's Mobile Crisis Response Team [place] (066) 227-2740 or [employer]  · Call the toll free National Suicide Prevention Hotlines   · National Suicide Prevention Lifeline 646-032-NOQQ (6308)  · National Hope Line Network 800-SUICIDE (683-5981)      ACTIVITY: Rest and take it easy for the first 24 hours.  A responsible adult is recommended to remain with you during that time.  It is normal to feel sleepy.  We encourage you to not do anything that requires balance, judgment or coordination.    MILD FLU-LIKE SYMPTOMS ARE NORMAL. YOU MAY EXPERIENCE GENERALIZED MUSCLE ACHES, THROAT IRRITATION, HEADACHE AND/OR SOME NAUSEA.    FOR 24 HOURS DO NOT:  Drive, operate machinery or run household appliances.  Drink beer or alcoholic beverages.   Make important decisions or sign legal documents.    DIET: To avoid nausea, slowly advance diet as tolerated, avoiding spicy or greasy foods for the first day.  Add more substantial food to your diet according to your physician's instructions.   Babies can be fed formula or breast milk as soon as they are hungry.  INCREASE FLUIDS AND FIBER TO AVOID CONSTIPATION.    SURGICAL DRESSING/BATHING: Keep dressing dry for 24 hours. May remove dressing and shower after 5pm on 12/1, do not need to replace dressing. Do not submerge in water or bath for 7 days.       FOLLOW-UP APPOINTMENT:  A follow-up appointment should be arranged with your doctor; call to schedule.    You should CALL YOUR PHYSICIAN if you develop:  Fever greater than 101 degrees F.  Pain not relieved by medication, or persistent nausea or vomiting.  Excessive bleeding (blood soaking through dressing) or unexpected drainage from the wound.  Extreme redness or swelling around the incision site, drainage of pus or foul smelling drainage.  Inability to urinate or empty your bladder within 8 hours.  Problems with breathing or chest pain.    You should call 911 if you develop problems with breathing or chest pain.  If you are unable to contact your doctor or surgical center, you should go to the nearest emergency room or urgent care center.  Physician's telephone #: [phone]    If any questions arise, call your doctor.  If your doctor is not available, please feel free to call the Surgical Center at [phone].  The Center is open Monday through Friday from 7AM to 7PM.  You can also call the [facility] HOTLINE open 24 hours/day, 7 days/week and speak to a nurse at [phone], or toll free at [phone].    A registered nurse may call you a few days after your surgery to see how you are doing after your procedure.    MEDICATIONS: Resume taking daily medication.  Take prescribed pain medication with food.  If no medication is prescribed, you may take non-aspirin pain medication if needed.  PAIN MEDICATION CAN BE VERY CONSTIPATING.  Take a stool softener or laxative such as senokot, pericolace, or milk of magnesia if needed.    If your physician has prescribed pain medication that includes  Acetaminophen (Tylenol), do not take additional Acetaminophen (Tylenol) while taking the prescribed medication.    Depression / Suicide Risk    As you are discharged from this [facility] [place] [facility] facility, it is important to learn how to keep safe from harming yourself.    Recognize the warning signs:  · Abrupt changes in personality, positive or negative- including increase in energy   · Giving away possessions  · Change in eating patterns- significant weight changes-  positive or negative  · Change in sleeping patterns- unable to sleep or sleeping all the time   · Unwillingness or inability to communicate  · Depression  · Unusual sadness, discouragement and loneliness  · Talk of wanting to die  · Neglect of personal appearance   · Rebelliousness- reckless behavior  · Withdrawal from people/activities they love  · Confusion- inability to concentrate     If you or a loved one observes any of these behaviors or has concerns about self-harm, here's what you can do:  · Talk about it- your feelings and reasons for harming yourself  · Remove any means that you might use to hurt yourself (examples: pills, rope, extension cords, firearm)  · Get professional help from the community (Mental Health, Substance Abuse, psychological counseling)  · Do not be alone:Call your Safe Contact- someone whom you trust who will be there for you.  · Call your local CRISIS HOTLINE 076-2114 or 410-198-9548  · Call your local Children's Mobile Crisis Response Team [place] (374) 275-5272 or www.[employer]  · Call the toll free National Suicide Prevention Hotlines   · National Suicide Prevention Lifeline 802-132-XMIS (8036)  · National Hope Line Network 800-SUICIDE (782-3467)    Post Angiogram Groin Care Instructions     INSTRUCTIONS  2. Examine (look and feel) the site of your incision site TODAY so you can recognize changes that should be called to your doctor (see below).  3. Avoid straining either by lifting or pulling objects for 4-5  "days. Avoid lifting over 5 pounds.   4. For at least 72 hours, if you should sneeze or cough, please hold pressure over your groin area.  5. If you should begin to have oozing from the catheterization site, please hold firm pressure and call your doctor's office immediately.  6. If profuse bleeding occurs from the catheterization site, hold firm pressure and call \"911\" immediately for assistance.  7. Remove bandage after 24 hours.     ACTIVITY  2. Limit activity as instructed by your doctor.  3. No driving or very limited driving with frequent stops for one week.   4. If you must take a long car ride, stop every hour and walk around the car.   5. Warm showers or baths are permitted after the bandage is removed. Avoid hot showers, baths, hot tubs, and swimming for one week.    PLEASE CALL YOUR DOCTOR IF:  2. Temperature elevation occurs.  3. Catheterization site becomes reddened or begins to drain.   4. Bruising appears to be new or not resolving. The bruise may move down your leg. This is normal.  5. The small round lump in the groin increases in size.  6. Any leg numbness, aching, or discomfort (immediately).  7. Increasing discomfort in the leg at the insertion site.  8. Chest pains, even if relieved by Nitroglycerin.    MISCELLANEOUS INSTRUCTIONS  1. Bruising may occur as a result of heart catheterization. Some of the discoloration may travel down the leg, going from blue to green in color.  2. A small round lump under the catheterization site will remain for up to six weeks.  3. If any questions arise call your physician's office. You can also call the "[employer]" HOTLINE open 24 hours/day, 7 days/week and speak to a nurse at [phone], or toll free at [phone].   4. You should call 911 if you develop problems with breathing or chest pain.    FOR PROBLEMS CALL WILLIAM STEIN AT: [phone]    I acknowledge receipt and understanding of these Home Care instructions.    "

## 2018-11-30 NOTE — PROGRESS NOTES
"1311 Pt arrived to [facility] PACU (PPU overflow)  with Cath lab RN. AAOx4. VSS. Denies pain and nausea. Right groin dressing is CDI and soft. Belongings returned to pt bedside. Pt tolerating PO intake.     1336 BrotherDannie, brought to bedside. Updated family on discharge and he responded by saying, \"I was told on some paper that he was getting a stent and staying overnight. That ruins my plans for this evening. What happens when I don't show up at 4pm to pick my brother out, are you going to just kick him out on the streets?\" I asked if he had spoken to the doctor yet, he said he hadn't. Called cath lab to give situational update to the charge and the charge nurse stated they would bring Althea over to the patient for an update and plan as soon as he was out of procedure.     1404 Dr. Oh at bedside for POC update. BrotherDannie, at bedside stating that he will not be taking him home to [facility] and asked why he was told that pt was going to be staying the night. Dr. Oh stated he would have his NP, Sayeh look into some options for the patient. Pt stated he would just go to the waiting room to sleep overnight or to a truck stop to hitchhike back to [place]. I reassured the patient that we would create a safe plan for him.    1419 Re-educated pt on keeping leg straight and staying on bedrest until 1600 per Dr. Oh's orders. Pt verbalized understanding. Pt's brother Dannie stated to the patient \"if you kick your leg around and it starts bleeding then they have to keep you overnight.\" Pt didn't respond. RN reminded pt not to move leg, pt agreed he wouldn't.     1422 Dannie stated, \"Well I'm leaving now, and I am not coming back to pick him up at 4pm. You can kick him out into the streets for all I care.\" RN asked family if the patient could stay with him in his [place] hotel room marilee and Dannie responded \"no he can't.\" Then he asked to be escorted to the elevator to leave.   "

## 2018-11-30 NOTE — DISCHARGE PLANNING
": LSW spoke with Dannie, pt's brother ([phone]), to inquire if he is able to pick his brother up or if anyone is able to. Dannie stated he was told that his brother would be staying overnight and that he made arrangements to stay in a hotel in [place]. LSW inquired if pt had any other family or friends that can pick pt up. Dannie stated he does not. LSW  Inquired if he is able to go the hotel with Dannie. Dannie stated no. LSW inquired what Dannie suggests that we do. Dannie stated \"just discharge him to the streets\". LSW spoke with [facility] supervisor who requested pt be placed in observation until Dannie can pick pt up tomorrow morning.     LSW informed bedside RN.     "

## 2018-11-30 NOTE — CATH LAB
Immediate Post-Operative Note      PreOp Diagnosis: CAD and ischemic cardiomyopathy    PostOp Diagnosis: same    Procedure(s) :  Coronary Angiography, Left Heart Catheterization, Left Ventriculography    Surgeon(s):  Willy Oh M.D.    Type of Anesthesia: Moderate Sedation    Specimen: None    Estimated Blood Loss: 5 cc's    Contrast Media:  96 cc's    Fluoro Time: 2.3 min    Xray DAP: 3541    Findings: Severe LV dysfunction with akinesis of the entire anterior wall.  Est EF 25-30%.  %  LAD treated with PTCA in October patent, but with slow flow. 60% lesion at origin of LAD.  MARLEY has 90% stenosis. RI 50-60% stenosis    Complications: none  Angiograms reviewed with CV surgeon. Patient not an appropriate candidate for CABG or stenting      Willy Oh M.D.  11/30/2018 12:46 PM

## 2018-11-30 NOTE — PROCEDURES
DATE OF SERVICE:  11/30/2018    PROCEDURES:  1.  Supervision of conscious sedation.  2.  Left heart catheterization.  3.  Left ventriculography.  4.  Selective coronary angiography.    INDICATIONS:  The patient is a 58-year-old gentleman with known severe   coronary artery disease who is undergoing angiography to determinate if he is   a candidate for coronary intervention.  He was hospitalized in October and   angiography at that time revealed complex coronary artery disease and he was   turned down for revascularization by surgery.  The patient returns now to   determine if he has had improvement in LV function and to reevaluate his LAD.    DESCRIPTION OF PROCEDURE:  The right groin was sterilely prepped and draped in   the usual fashion and the area of the right femoral artery anesthetized with   2% lidocaine.  Conscious sedation was obtained using Versed 1 mg and fentanyl   50 mcg.    The area of the right femoral artery was anesthetized with 2% lidocaine.  The   right femoral head was visualized under fluoroscopy and the artery was easily   entered.  A 4-Lao sheath was placed.  A 4-Lao left Shane catheter was   placed and advanced into the ostium of the left main coronary artery where   selective angiograms were performed.    Next, a 3D right coronary catheter was exchanged and advanced into the ostium   of the right coronary artery where selective angiograms were again performed.    Next, a pigtail catheter was exchanged and a left heart catheterization was   performed.  This was followed by left ventriculogram using 24 mL of contrast.    A left heart pullback was performed.  The catheter was removed.  Surgical   consultation was obtained while the patient was still on the cath lab table   and after reviewing the films, it was felt that the patient was not a   candidate for either coronary intervention or bypass graft surgery, should be   treated medically.  At this point, the arterial sheath was  removed and   hemostasis was obtained by direct compression of the artery.    COMPLICATIONS:  There were no complications.    ESTIMATED BLOOD LOSS:  Less than 10 mL    FLUOROSCOPY TIME:  2.3 minutes.    X-RAY DOSE-AREA PRODUCT:  3541.    TOTAL CONTRAST MEDIA:  96 mL of Omnipaque 350.    HEMODYNAMICS:  Revealed sinus rhythm throughout the procedure.  Aortic   pressure 128/66 mmHg, LV pressure 121/32 mmHg.  LV pullback does not   demonstrate a gradient.    Fluoroscopy of the heart demonstrates presence of an AICD.    The left ventriculogram demonstrates akinesis of the proximal, mid, and distal   anterior walls and apex.  The inferior wall contracts well.  The inferoapical   segment is akinetic.  Calculated ejection fraction is 32%.    The right coronary artery is occluded ____.  There is a very proximal RV probe   free wall branch that fills well.  The distal PDA fills via left-sided   collaterals.  The collaterals rise from the circumflex and fill the posterior   left ventricular branch and fill the right coronary artery in a retrograde   fashion to a level just proximal to the acute margin.  The PDA also fills by   collaterals and is a small caliber vessel.    The left main is free of disease and trifurcates into a large caliber ramus   intermedius, the LAD and the circumflex.  The LAD was occluded during the last   hospitalization in October when he was treated with angioplasty.  He is now   patent and there is residual 70% stenosis in the very proximal LAD.  The LAD   fills rather slowly and diffusely small caliber midsection.  At the apex,   there is a greater than 90% lesion in the LAD and then beyond this LAD   supplies inferoapical segment of the left ventricle.  The LAD gives rise to a   large caliber first diagonal artery that has an eccentric 90% stenosis in its   proximal portion.  This is unchanged from the angiogram a month ago.    The ramus intermedius is a large-caliber vessel and has a 60% proximal    stenosis.    The circumflex gives rise to an atrial branch and then a second atrial branch   rises slightly distally.  Beyond this, the circumflex is a small vessel   continuing in the AV groove.  There is collateralization from the circumflex   to the right coronary artery.    IMPRESSIONS:  1.  Severe left ventricular dysfunction with akinesis of the mid to distal   anterior wall and apex with elevated left ventricular end-diastolic pressure.    2.  Coronary artery disease with:  1.  Complete occlusion of the very proximal right coronary artery with left to   right collateral filling of the distal right coronary artery.  2.  A 60% proximal lesion in the large ramus intermedius.  3.  A 90% or greater lesion in the proximal left anterior descending diagonal.  4.  A 70% lesion in the proximal left anterior descending.  5.  Greater than 95% lesion in the very distal left anterior descending.    As noted above, the angiograms were reviewed in the cath lab with   cardiovascular surgery and it is felt the patient was not an appropriate   candidate for intervention on the LAD, as the area appears to be totally   infarcted and akinetic.  Recommend continued medical therapy.       ____________________________________     SCOOBY STEIN MD    RPS / NTS    DD:  11/30/2018 13:14:58  DT:  11/30/2018 13:50:15    D#:  [id number]  Job#:  [id number]    cc: IGNACIO NGO MD

## 2018-11-30 NOTE — DISCHARGE PLANNING
: LSW spoke with bedside RN who stated that pt's brother was supposed to pick pt up; however, he left without taking his brother. LSW called Dannie, pt's brother ([phone]) to inquire if he is able to  brother. Dannie asked LSW to call him back in 15 minutes since he is driving.

## 2018-12-01 ENCOUNTER — PATIENT OUTREACH (OUTPATIENT)
Dept: HEALTH INFORMATION MANAGEMENT | Facility: OTHER | Age: 58
End: 2018-12-01

## 2018-12-01 VITALS
OXYGEN SATURATION: 90 % | RESPIRATION RATE: 16 BRPM | HEIGHT: 70 IN | BODY MASS INDEX: 25 KG/M2 | TEMPERATURE: 97.7 F | SYSTOLIC BLOOD PRESSURE: 110 MMHG | WEIGHT: 174.6 LBS | HEART RATE: 72 BPM | DIASTOLIC BLOOD PRESSURE: 70 MMHG

## 2018-12-01 LAB
ANION GAP SERPL CALC-SCNC: 7 MMOL/L (ref 0–11.9)
BUN SERPL-MCNC: 15 MG/DL (ref 8–22)
CALCIUM SERPL-MCNC: 8.4 MG/DL (ref 8.5–10.5)
CHLORIDE SERPL-SCNC: 102 MMOL/L (ref 96–112)
CO2 SERPL-SCNC: 24 MMOL/L (ref 20–33)
CREAT SERPL-MCNC: 0.65 MG/DL (ref 0.5–1.4)
GLUCOSE SERPL-MCNC: 244 MG/DL (ref 65–99)
INR PPP: 1.12 (ref 0.87–1.13)
POTASSIUM SERPL-SCNC: 4.1 MMOL/L (ref 3.6–5.5)
PROTHROMBIN TIME: 14.6 SEC (ref 12–14.6)
SODIUM SERPL-SCNC: 133 MMOL/L (ref 135–145)

## 2018-12-01 PROCEDURE — 80048 BASIC METABOLIC PNL TOTAL CA: CPT

## 2018-12-01 PROCEDURE — G0378 HOSPITAL OBSERVATION PER HR: HCPCS

## 2018-12-01 PROCEDURE — 700102 HCHG RX REV CODE 250 W/ 637 OVERRIDE(OP): Performed by: NURSE PRACTITIONER

## 2018-12-01 PROCEDURE — 36415 COLL VENOUS BLD VENIPUNCTURE: CPT

## 2018-12-01 PROCEDURE — A9270 NON-COVERED ITEM OR SERVICE: HCPCS | Performed by: NURSE PRACTITIONER

## 2018-12-01 PROCEDURE — 85610 PROTHROMBIN TIME: CPT

## 2018-12-01 PROCEDURE — 700105 HCHG RX REV CODE 258: Performed by: INTERNAL MEDICINE

## 2018-12-01 RX ADMIN — FUROSEMIDE 20 MG: 20 TABLET ORAL at 05:29

## 2018-12-01 RX ADMIN — LOSARTAN POTASSIUM 75 MG: 25 TABLET, FILM COATED ORAL at 05:29

## 2018-12-01 RX ADMIN — SODIUM CHLORIDE: 9 INJECTION, SOLUTION INTRAVENOUS at 05:26

## 2018-12-01 RX ADMIN — METOPROLOL SUCCINATE 25 MG: 25 TABLET, EXTENDED RELEASE ORAL at 05:29

## 2018-12-01 RX ADMIN — ASPIRIN 81 MG: 81 TABLET, CHEWABLE ORAL at 05:29

## 2018-12-01 RX ADMIN — SPIRONOLACTONE 25 MG: 25 TABLET ORAL at 05:29

## 2018-12-01 RX ADMIN — AMIODARONE HYDROCHLORIDE 200 MG: 200 TABLET ORAL at 05:29

## 2018-12-01 ASSESSMENT — ENCOUNTER SYMPTOMS
COUGH: 0
STRIDOR: 0
CHEST TIGHTNESS: 0
SHORTNESS OF BREATH: 0
WHEEZING: 0
CHOKING: 0
APNEA: 0

## 2018-12-01 NOTE — OR NURSING
Right groin dressing CDI. Pt transported up to T716-2 with transport monitor and RN. Report given to KUNAL Aguillon. Pt ambulated to bed with stand by assist. No complications with groin.

## 2018-12-01 NOTE — PROGRESS NOTES
2 RN skin check:    Sacrum discolored, pink, blanching  Right outer ankle excoriation/wound - pictures uploaded into epic  Bilateral lower extremity - dry, flaky, calloused - floated on pillows  Otherwise skin intact throughout

## 2018-12-01 NOTE — PROGRESS NOTES
Discharge instructions reviewed and given to patient at bedside, verbalizes understanding and states plans for follow-up with PCP. New and home medication review, post-discharge activity level and worsening of symptoms needing follow-up care discussed. Telemetry monitor/IV cathlon removed. All belongings accounted for, all questions answered at this time. Patient awaiting on arrival of brother to be d/c home. No distress noted.

## 2018-12-01 NOTE — PROGRESS NOTES
Inpatient Anticoagulation Service Note    Date: 12/1/2018  Reason for Anticoagulation: Atrial Fibrillation        Hemoglobin Value: (!) 12.3  Hematocrit Value: (!) 38.9  Lab Platelet Value: 206  Target INR: 2.0 to 3.0    INR from last 7 days     Date/Time INR Value    12/01/18 0311  1.12    11/30/18 0950  1.04        Dose from last 7 days     Date/Time Dose (mg)    11/30/18 1700  6        Average Dose (mg):  (4 mg po qday per med/rec)  Significant Interactions: Amiodarone, Aspirin, Statin (spironolactone)  Bridge Therapy: No     Reversal Agent Administered: Not Applicable    Comments:   · 58 you male admitted for observation overnight after left heart catheterization. Pt found to have  90% occlusion of LAD. No intervention was made at that time. Pt to continue with medical management.  Pt was to d/c home after procedure but there was complications as to where pt would stay tonight.  Pt now admitted for observation after cardiac cath.  Pt continue on warfarin from prior to admission, last dose taken was on 11/23/18.  · INR sub-therapeutic, slight increase over interval. Pt has multiple warfarin-drug interactions that are continued from home.   · His home dose is usually 4 mg po qday.  Given bolus dose of 6 mg po followed by warfarin 4 mg po qday. Repeat INR in AM. Pharmacy will continue to monitor while pt is admitted.     Plan:    · Warfarin 4 mg po qday. Repeat INR in AM. Pharmacy will continue to monitor while pt is admitted.     Education Material Provided?:  (chronic warfarin pt)  Pharmacist suggested discharge dosing: Resume home dose of warfarin 4 mg po qday with follow up INR within 4-5 days of discharge.     Rodo Lou PharmD BCPS

## 2018-12-01 NOTE — OR NURSING
1532 Call received from [facility], pt will be admitted. Attempted to notify family, no answer. Notified pt, pt said he would let his brother know.    1546 Per pt, brother states he will  pt from tele at discharge tomorrow.

## 2018-12-01 NOTE — PROGRESS NOTES
Patient arrived to unit-Tele716-2 Transferred via gurney, ambulated to bed. Received report from PPU RN, Pt assessed, A&Ox4, Vitals stable. No SOB Noted.   Pt updated on plan of care, Call light within reach, personal belongings within reach.  Bed in lowest position.  Pt ambulates via standby assist. Tele monitor on and monitor room notified, rhythm is SR 81. Will continue to monitor. Hourly rounding in place.

## 2018-12-01 NOTE — CARE PLAN
Problem: Infection  Goal: Will remain free from infection    Intervention: Implement standard precautions and perform hand washing before and after patient contact  Patient educated on hand hygiene and importance of cleanliness. Patient verbalizes understanding. RN performs appropriate hand hygiene and maintains aseptic technique when in contact with patient.      Problem: Knowledge Deficit  Goal: Knowledge of disease process/condition, treatment plan, diagnostic tests, and medications will improve    Intervention: Explain information regarding disease process/condition, treatment plan, diagnostic tests, and medications and document in education  Patient educated on POC, treatment plan, diagnostics tests, medications, diet, and encouraged to ask questions regarding care. Patient  verbalizes understanding. Reoriented to call light for assistance. Hourly rounding in place.

## 2018-12-01 NOTE — CARE PLAN
Problem: Communication  Goal: The ability to communicate needs accurately and effectively will improve  Listened to patients discussion of concerns related to disease process and treatment plan. Discussed with patient concerns, goals and management. Discussed importance of patient reporting new signs and symptoms related to disease process. Patient verbalized understanding and understands importance of communicating needs.  Supported and educated patient by addressing specific questions regarding diagnosis, risks, benefits of pain management treatment.              Problem: Safety  Goal: Will remain free from falls  Pt is wearing non slip socks, bed is locked and in lowest position, call light and personal belongings are within reach.

## 2018-12-01 NOTE — PROGRESS NOTES
Report received, pt care assumed, tele box on. VSS, pt assessment complete. Pt resting quietly in bed, aaox4, no signs of distress noted at this time. POC discussed with pt and verbalizes no questions. Pt denies chest pain, pain or sob at this time.Right groin cath site w/o hematoma or bleeding, area is soft and dressing is CD&I. Pt denies any additional needs at this time. Bed in lowest position, bed alarm on, pt educated on fall risk and verbalized understanding, call light within reach, will continue to monitor.

## 2018-12-01 NOTE — PROGRESS NOTES
Cardiology Follow Up Progress Note    Date of Service  12/1/2018    Attending Physician  Willy Oh M.D.           History of     ST elevation anterior MI 9/24/18, complete heart block s/p status post AICD [employer] 9/24/18, ischemic cardiomyopathy, EF 20%, multivessel coronary artery disease by coronary angiography on 9/24/18, not a good candidate for CABG, hypertension, chronic tobacco use, atrial fib, on amiodarone therapy, in addition on oral anticoagulation with Coumadin    Admitted for     Elective admission and possible PCI to LAD, not a good candidate for CABG, patient has followed up with Dr. Lyle as an outpatient    Interim Events    12/1/18, no rhythm issues overnight    Review of Systems  Review of Systems   Respiratory: Negative for apnea, cough, choking, chest tightness, shortness of breath, wheezing and stridor.        Vital signs in last 24 hours  Temp:  [36.4 °C (97.6 °F)-36.9 °C (98.5 °F)] 36.6 °C (97.9 °F)  Pulse:  [67-80] 67  Resp:  [11-18] 17  BP: (102-125)/(65-74) 108/67    Physical Exam  Physical Exam   Constitutional: He is oriented to person, place, and time.   HENT:   Head: Normocephalic.   Eyes: Conjunctivae are normal.   Neck: No JVD present. No thyromegaly present.   Pulmonary/Chest: He has no wheezes.   Abdominal: Soft.   Musculoskeletal: He exhibits no edema.   Neurological: He is alert and oriented to person, place, and time.   Skin: Skin is warm and dry.   Right femoral artery without evidence of hematoma       Lab Review  Lab Results   Component Value Date/Time    WBC 8.6 11/30/2018 09:50 AM    RBC 4.37 (L) 11/30/2018 09:50 AM    HEMOGLOBIN 12.3 (L) 11/30/2018 09:50 AM    HEMATOCRIT 38.9 (L) 11/30/2018 09:50 AM    MCV 89.0 11/30/2018 09:50 AM    MCH 28.1 11/30/2018 09:50 AM    MCHC 31.6 (L) 11/30/2018 09:50 AM    MPV 10.8 11/30/2018 09:50 AM      Lab Results   Component Value Date/Time    SODIUM 133 (L) 12/01/2018 03:11 AM    POTASSIUM 4.1 12/01/2018 03:11 AM    CHLORIDE  102 12/01/2018 03:11 AM    CO2 24 12/01/2018 03:11 AM    GLUCOSE 244 (H) 12/01/2018 03:11 AM    BUN 15 12/01/2018 03:11 AM    CREATININE 0.65 12/01/2018 03:11 AM      Lab Results   Component Value Date/Time    ASTSGOT 13 11/30/2018 09:50 AM    ALTSGPT 11 11/30/2018 09:50 AM     Lab Results   Component Value Date/Time    CHOLSTRLTOT 128 09/25/2018 01:28 AM    LDL 85 09/25/2018 01:28 AM    HDL 24 (A) 09/25/2018 01:28 AM    TRIGLYCERIDE 94 09/25/2018 01:28 AM    TROPONINI 5.83 (H) 09/30/2018 04:33 PM               Assessment    Known severe coronary artery disease, he was turned down for CABG    History of AICD 9/26/18 for complete heart block and ischemic cardiomyopathy    Diabetes    Hypertension      Ischemic cardiomyopathy, LVEF 20-25%      ACC/AHA stage C class II systolic heart failure      Paroxysmal A. fib, on amiodarone therapy, on Coumadin      S/p left heart cath 11/30/18 , known severe coronary artery disease showed  (occlusion of proximal RCA with left-to-right collaterals, 60% large RI, 90% proximal MARLEY, 70% proximal LAD, 95% distal LAD, patient was not an appropriate candidate for intervention on the LAD, will continue with medical therapy      Plan  Continue with optimal guided medical therapy      Continue with aspirin 81, Lipitor 80 mg, losartan 75 mg daily, Toprol-XL 25 mg, spironolactone 25 mg      Continue with amiodarone 200 mg daily and warfarin for history of PAF.      Continue with Lasix 20 mg daily, dry weight 164 pounds, euvolemic on discharge        Follow-up appointment with our cardiology office 7-9 days.      With recent history of  ST elevation anterior MI  9/24/28 and recent report of not being a candidate for CABG, consider dual antiplatelet therapy, currently  on aspirin & Coumadin, discuss Plavix and warfarin as outpatient.    Future Appointments  Date Time Provider Department Center   12/10/2018 1:15 PM Ran Page M.D. [facility] None   3/11/2019 1:00 PM REMOTE MONITORING - [facility]  [facility] None

## 2018-12-01 NOTE — PROGRESS NOTES
Inpatient Anticoagulation Service Note    Date: 11/30/2018  Reason for Anticoagulation: Atrial Fibrillation        Hemoglobin Value: (!) 12.3  Hematocrit Value: (!) 38.9  Lab Platelet Value: 206  Target INR: 2.0 to 3.0    INR from last 7 days     Date/Time INR Value    11/30/18 0950  1.04        Dose from last 7 days     Date/Time Dose (mg)    11/30/18 1700  6        Average Dose (mg):  (4 mg po qday per med/rec)  Significant Interactions: Amiodarone, Aspirin, Statin (spironolactone)  Bridge Therapy: No     Reversal Agent Administered: Not Applicable  Comments: 58 you male admitted for observation overnight after left heart catheterization. Pt found to have  90% occlusion of LAD. No intervention was made at that time. Pt to continue with medical management.  Pt was to d/c home after procedure but there was complications as to where pt would stay tonight.  Pt now admitted for observation after cardiac cath.  Pt continue on warfarin from prior to admission, last dose taken was on 11/23/18. INR is now sub-therapeutic. Pt has multiple warfarin-drug interactions that are continued from home. His home dose is usually 4 mg po qday.  Will give bolus dose of 6 mg po tonight followed by warfarin 4 mg po qday. Repeat INR in AM. Pharmacy will continue to monitor while pt is admitted.     Plan:  Will give bolus dose of 6 mg po tonight followed by warfarin 4 mg po qday. Repeat INR in AM. Pharmacy will continue to monitor while pt is admitted.   Education Material Provided?:  (chronic warfarin pt)  Pharmacist suggested discharge dosing: Resume home dose of warfarin 4 mg po qday with follow up INR within 4-5 days of discharge.      Breana Morris, PharmD, BCOP              no

## 2018-12-01 NOTE — PROGRESS NOTES
Received report from day shift RN. Assumed care of patient at 1900. Patient A&Ox4. On room air, no signs of respiratory distress. Patient denies pain at this time. POC discussed and agreed upon with patient. Call light and belongings within reach. Bed in lowest locked position. Upper side rails raised. Fall risk precautions in place. All questions answered at this time. Hourly rounding in place. Will continue to monitor.

## 2018-12-10 ENCOUNTER — OFFICE VISIT (OUTPATIENT)
Dept: CARDIOLOGY | Facility: MEDICAL CENTER | Age: 58
End: 2018-12-10
Payer: MEDICARE

## 2018-12-10 VITALS
DIASTOLIC BLOOD PRESSURE: 70 MMHG | SYSTOLIC BLOOD PRESSURE: 110 MMHG | HEART RATE: 94 BPM | BODY MASS INDEX: 25.05 KG/M2 | HEIGHT: 70 IN | OXYGEN SATURATION: 97 %

## 2018-12-10 DIAGNOSIS — F17.200 SMOKING: ICD-10-CM

## 2018-12-10 DIAGNOSIS — I51.89 LEFT VENTRICULAR SYSTOLIC DYSFUNCTION, NYHA CLASS 3: ICD-10-CM

## 2018-12-10 DIAGNOSIS — I50.20 ACC/AHA STAGE D SYSTOLIC HEART FAILURE (HCC): ICD-10-CM

## 2018-12-10 DIAGNOSIS — I25.5 ISCHEMIC CARDIOMYOPATHY: ICD-10-CM

## 2018-12-10 DIAGNOSIS — Z79.899 HIGH RISK MEDICATION USE: ICD-10-CM

## 2018-12-10 DIAGNOSIS — Z95.810 AUTOMATIC IMPLANTABLE CARDIOVERTER-DEFIBRILLATOR IN SITU: ICD-10-CM

## 2018-12-10 DIAGNOSIS — I25.10 CORONARY ARTERY DISEASE INVOLVING NATIVE CORONARY ARTERY OF NATIVE HEART WITHOUT ANGINA PECTORIS: ICD-10-CM

## 2018-12-10 PROCEDURE — 99497 ADVNCD CARE PLAN 30 MIN: CPT | Performed by: INTERNAL MEDICINE

## 2018-12-10 PROCEDURE — 99214 OFFICE O/P EST MOD 30 MIN: CPT | Mod: 25 | Performed by: INTERNAL MEDICINE

## 2018-12-10 ASSESSMENT — ENCOUNTER SYMPTOMS
FALLS: 0
DOUBLE VISION: 0
SENSORY CHANGE: 0
HEADACHES: 0
PALPITATIONS: 0
SHORTNESS OF BREATH: 1
FEVER: 0
DEPRESSION: 0
ABDOMINAL PAIN: 0
MYALGIAS: 0
BRUISES/BLEEDS EASILY: 0
DIZZINESS: 0
BLURRED VISION: 0
COUGH: 0
DIAPHORESIS: 0
MEMORY LOSS: 0

## 2018-12-10 NOTE — LETTER
[facility]   [address]  [place] [address]  Phone: [phone]  Fax: [phone]              Nisa Ross  1960    Encounter Date: 12/10/2018    Ran Page M.D.          PROGRESS NOTE:  Chief Complaint   Patient presents with   • Coronary Artery Disease     HF Est.        Subjective:   Nisa Ross is a 58 y.o. male who presents today for cardiac care and management due to ischemic cardiomyopathy, LVEF of 15%, extensive coronary arterial disease not amenable for PCI or open heart revascularization, diabetes, hypertension, HLP.    Patient does not know his medications at this time.  He reports that he is taking his medications.  He does not know the name or dosage.    Patient still gets winded with daily living activities and exertion. No symptoms at rest.      Past Medical History:   Diagnosis Date   • Acute coronary syndrome ([facility]) 9/24/2018   • Complete heart block by electrocardiogram ([facility]) 9/24/2018   • Diabetes     type II   • DM (diabetes mellitus) ([facility]) 9/24/2018   • HTN (hypertension), malignant 9/24/2018   • Rheumatic fever     1974   • Smoking 9/24/2018   • STEMI (ST elevation myocardial infarction) ([facility]) 9/24/2018     Past Surgical History:   Procedure Laterality Date   • AICD IMPLANT  09/26/2018   • CERVICAL DISK AND FUSION ANTERIOR  4/23/2010    Performed by ESTHER BELL at SURGERY [facility] ORS   • OTHER ORTHOPEDIC SURGERY  1975    right leg      Family History   Problem Relation Age of Onset   • Cancer Mother    • Heart Disease Brother         CABGx3     Social History     Social History   • Marital status: Single     Spouse name: N/A   • Number of children: N/A   • Years of education: N/A     Occupational History   • Not on file.     Social History Main Topics   • Smoking status: Former Smoker     Packs/day: 1.00     Years: 40.00     Types: Cigarettes     Quit date: 9/28/2018   • Smokeless tobacco: Former  User     Types: Chew     Quit date: 10/16/2008      Comment: 1 pack isabel day and a half   • Alcohol use 7.2 oz/week     12 Cans of beer per week      Comment: 12 pack in a month   • Drug use: No   • Sexual activity: Not on file     Other Topics Concern   • Not on file     Social History Narrative   • No narrative on file     No Known Allergies  Outpatient Encounter Prescriptions as of 12/10/2018   Medication Sig Dispense Refill   • amiodarone (CORDARONE) 200 MG Tab Take 1 Tab by mouth every day. 30 Tab    • warfarin (COUMADIN) 7.5 MG Tab Take 1 Tab by mouth COUMADIN-DAILY. (Patient taking differently: Take 4 mg by mouth every day.) 30 Tab 3   • insulin glargine (LANTUS) 100 UNIT/ML Solution Inject 25 Units as instructed every evening. 10 mL    • atorvastatin (LIPITOR) 80 MG tablet Take 1 Tab by mouth every evening. 30 Tab    • HYDROcodone/acetaminophen (NORCO)  MG Tab Take 1-2 Tabs by mouth every 6 hours as needed.     • metoprolol SR (TOPROL XL) 25 MG TABLET SR 24 HR Take 1 Tab by mouth every day. 30 Tab 11   • aspirin EC 81 MG EC tablet Take 1 Tab by mouth every day. (Patient not taking: Reported on 12/10/2018) 30 Tab    • nitroglycerin (NITROSTAT) 0.4 MG SL Tab Place 1 Tab under tongue as needed for Chest Pain (up to 3 doses (if SBP greater than 90 mmHg)). 25 Tab    • insulin regular (HUMULIN R) 100 Unit/mL Solution Inject 3-14 Units as instructed 4 Times a Day,Before Meals and at Bedtime. (Patient not taking: Reported on 12/10/2018) 10 mL    • losartan (COZAAR) 25 MG Tab Take 3 Tabs by mouth every day. 30 Tab    • furosemide (LASIX) 20 MG Tab Take 1 Tab by mouth every day. (Patient not taking: Reported on 12/10/2018) 60 Tab    • spironolactone (ALDACTONE) 25 MG Tab Take 1 Tab by mouth every day. 30 Tab 3     No facility-administered encounter medications on file as of 12/10/2018.      Review of Systems   Constitutional: Negative for diaphoresis and fever.   HENT: Negative for nosebleeds.    Eyes: Negative  "for blurred vision and double vision.   Respiratory: Positive for shortness of breath. Negative for cough.    Cardiovascular: Negative for chest pain and palpitations.   Gastrointestinal: Negative for abdominal pain.   Genitourinary: Negative for dysuria and frequency.   Musculoskeletal: Negative for falls and myalgias.   Skin: Negative for rash.   Neurological: Negative for dizziness, sensory change and headaches.   Endo/Heme/Allergies: Does not bruise/bleed easily.   Psychiatric/Behavioral: Negative for depression and memory loss.        Objective:   /70 (BP Location: Left arm, Patient Position: Sitting, BP Cuff Size: Adult)   Pulse 94   Ht 1.778 m (5' 10\")   SpO2 97%   BMI 25.05 kg/m²      Physical Exam   Constitutional: No distress.   HENT:   Head: Normocephalic and atraumatic.   Right Ear: External ear normal.   Left Ear: External ear normal.   Eyes: Right eye exhibits no discharge. Left eye exhibits no discharge.   Neck: No JVD present. No thyromegaly present.   Cardiovascular: Normal rate, regular rhythm, normal heart sounds and intact distal pulses.  Exam reveals no gallop and no friction rub.    No murmur heard.  Pulmonary/Chest: Breath sounds normal. No respiratory distress.   Abdominal: Bowel sounds are normal. He exhibits no distension. There is no tenderness.   Musculoskeletal: He exhibits no edema or tenderness.   Neurological: No cranial nerve deficit.   Awake and communicative   Skin: Skin is warm and dry. He is not diaphoretic.   Psychiatric: He has a normal mood and affect. His behavior is normal.   Nursing note and vitals reviewed.      Assessment:     1. ACC/AHA stage D systolic heart failure (HCC)  REFERRAL TO HOSPICE   2. Left ventricular systolic dysfunction, NYHA class 3  REFERRAL TO HOSPICE   3. Ischemic cardiomyopathy  REFERRAL TO HOSPICE   4. Coronary artery disease involving native coronary artery of native heart without angina pectoris  REFERRAL TO HOSPICE   5. Automatic " implantable cardioverter-defibrillator in situ     6. Smoking     7. High risk medication use         Medical Decision Making:  Today's Assessment / Status / Plan:   I spent 30 minutes talking to patient and family members at bedside about end-of-life issue.  At this time, patient has end-stage heart failure with multiple comorbidities including end organ damage.  Mortality is high.  Further invasive cardiac procedure or surgery would not change the overall mortality of this patient.  Patient is not a candidate for further advanced heart failure therapies such as left ventricular assistance device or heart transplant.  The best option for this patient is hospice care.  I explained to patient about the option and he agreed to proceed.  We will place hospice consult.    In the meantime, we will continue current medical therapy without change.  I do not think it safe for me to make changes today since patient does not know exactly what he is on.      Apolinar Ruiz M.D.  [address] [place] [address]  [place] [address]  VIA Facsimile: [phone]

## 2018-12-10 NOTE — PROGRESS NOTES
Chief Complaint   Patient presents with   • Coronary Artery Disease     HF Est.        Subjective:   Nisa Ross is a 58 y.o. male who presents today for cardiac care and management due to ischemic cardiomyopathy, LVEF of 15%, extensive coronary arterial disease not amenable for PCI or open heart revascularization, diabetes, hypertension, HLP.    Patient does not know his medications at this time.  He reports that he is taking his medications.  He does not know the name or dosage.    Patient still gets winded with daily living activities and exertion. No symptoms at rest.      Past Medical History:   Diagnosis Date   • Acute coronary syndrome ([facility]) 9/24/2018   • Complete heart block by electrocardiogram ([facility]) 9/24/2018   • Diabetes     type II   • DM (diabetes mellitus) ([facility]) 9/24/2018   • HTN (hypertension), malignant 9/24/2018   • Rheumatic fever     1974   • Smoking 9/24/2018   • STEMI (ST elevation myocardial infarction) ([facility]) 9/24/2018     Past Surgical History:   Procedure Laterality Date   • AICD IMPLANT  09/26/2018   • CERVICAL DISK AND FUSION ANTERIOR  4/23/2010    Performed by ESTHER BELL at SURGERY [facility] ORS   • OTHER ORTHOPEDIC SURGERY  1975    right leg      Family History   Problem Relation Age of Onset   • Cancer Mother    • Heart Disease Brother         CABGx3     Social History     Social History   • Marital status: Single     Spouse name: N/A   • Number of children: N/A   • Years of education: N/A     Occupational History   • Not on file.     Social History Main Topics   • Smoking status: Former Smoker     Packs/day: 1.00     Years: 40.00     Types: Cigarettes     Quit date: 9/28/2018   • Smokeless tobacco: Former User     Types: Chew     Quit date: 10/16/2008      Comment: 1 pack isabel day and a half   • Alcohol use 7.2 oz/week     12 Cans of beer per week      Comment: 12 pack in a month   • Drug use: No   • Sexual activity: Not on file     Other Topics Concern   • Not on file      Social History Narrative   • No narrative on file     No Known Allergies  Outpatient Encounter Prescriptions as of 12/10/2018   Medication Sig Dispense Refill   • amiodarone (CORDARONE) 200 MG Tab Take 1 Tab by mouth every day. 30 Tab    • warfarin (COUMADIN) 7.5 MG Tab Take 1 Tab by mouth COUMADIN-DAILY. (Patient taking differently: Take 4 mg by mouth every day.) 30 Tab 3   • insulin glargine (LANTUS) 100 UNIT/ML Solution Inject 25 Units as instructed every evening. 10 mL    • atorvastatin (LIPITOR) 80 MG tablet Take 1 Tab by mouth every evening. 30 Tab    • HYDROcodone/acetaminophen (NORCO)  MG Tab Take 1-2 Tabs by mouth every 6 hours as needed.     • metoprolol SR (TOPROL XL) 25 MG TABLET SR 24 HR Take 1 Tab by mouth every day. 30 Tab 11   • aspirin EC 81 MG EC tablet Take 1 Tab by mouth every day. (Patient not taking: Reported on 12/10/2018) 30 Tab    • nitroglycerin (NITROSTAT) 0.4 MG SL Tab Place 1 Tab under tongue as needed for Chest Pain (up to 3 doses (if SBP greater than 90 mmHg)). 25 Tab    • insulin regular (HUMULIN R) 100 Unit/mL Solution Inject 3-14 Units as instructed 4 Times a Day,Before Meals and at Bedtime. (Patient not taking: Reported on 12/10/2018) 10 mL    • losartan (COZAAR) 25 MG Tab Take 3 Tabs by mouth every day. 30 Tab    • furosemide (LASIX) 20 MG Tab Take 1 Tab by mouth every day. (Patient not taking: Reported on 12/10/2018) 60 Tab    • spironolactone (ALDACTONE) 25 MG Tab Take 1 Tab by mouth every day. 30 Tab 3     No facility-administered encounter medications on file as of 12/10/2018.      Review of Systems   Constitutional: Negative for diaphoresis and fever.   HENT: Negative for nosebleeds.    Eyes: Negative for blurred vision and double vision.   Respiratory: Positive for shortness of breath. Negative for cough.    Cardiovascular: Negative for chest pain and palpitations.   Gastrointestinal: Negative for abdominal pain.   Genitourinary: Negative for dysuria and frequency.  "  Musculoskeletal: Negative for falls and myalgias.   Skin: Negative for rash.   Neurological: Negative for dizziness, sensory change and headaches.   Endo/Heme/Allergies: Does not bruise/bleed easily.   Psychiatric/Behavioral: Negative for depression and memory loss.        Objective:   /70 (BP Location: Left arm, Patient Position: Sitting, BP Cuff Size: Adult)   Pulse 94   Ht 1.778 m (5' 10\")   SpO2 97%   BMI 25.05 kg/m²     Physical Exam   Constitutional: No distress.   HENT:   Head: Normocephalic and atraumatic.   Right Ear: External ear normal.   Left Ear: External ear normal.   Eyes: Right eye exhibits no discharge. Left eye exhibits no discharge.   Neck: No JVD present. No thyromegaly present.   Cardiovascular: Normal rate, regular rhythm, normal heart sounds and intact distal pulses.  Exam reveals no gallop and no friction rub.    No murmur heard.  Pulmonary/Chest: Breath sounds normal. No respiratory distress.   Abdominal: Bowel sounds are normal. He exhibits no distension. There is no tenderness.   Musculoskeletal: He exhibits no edema or tenderness.   Neurological: No cranial nerve deficit.   Awake and communicative   Skin: Skin is warm and dry. He is not diaphoretic.   Psychiatric: He has a normal mood and affect. His behavior is normal.   Nursing note and vitals reviewed.      Assessment:     1. ACC/AHA stage D systolic heart failure (HCC)  REFERRAL TO HOSPICE   2. Left ventricular systolic dysfunction, NYHA class 3  REFERRAL TO HOSPICE   3. Ischemic cardiomyopathy  REFERRAL TO HOSPICE   4. Coronary artery disease involving native coronary artery of native heart without angina pectoris  REFERRAL TO HOSPICE   5. Automatic implantable cardioverter-defibrillator in situ     6. Smoking     7. High risk medication use         Medical Decision Making:  Today's Assessment / Status / Plan:   I spent 30 minutes talking to patient and family members at bedside about end-of-life issue.  At this time, " patient has end-stage heart failure with multiple comorbidities including end organ damage.  Mortality is high.  Further invasive cardiac procedure or surgery would not change the overall mortality of this patient.  Patient is not a candidate for further advanced heart failure therapies such as left ventricular assistance device or heart transplant.  The best option for this patient is hospice care.  I explained to patient about the option and he agreed to proceed.  We will place hospice consult.    In the meantime, we will continue current medical therapy without change.  I do not think it safe for me to make changes today since patient does not know exactly what he is on.

## 2018-12-14 ENCOUNTER — TELEPHONE (OUTPATIENT)
Dept: CARDIOLOGY | Facility: MEDICAL CENTER | Age: 58
End: 2018-12-14

## 2019-04-12 ENCOUNTER — TELEMEDICINE2 (OUTPATIENT)
Dept: DERMATOLOGY | Facility: IMAGING CENTER | Age: 59
End: 2019-04-12
Payer: MEDICARE

## 2019-04-12 DIAGNOSIS — L85.3 XEROSIS CUTIS: ICD-10-CM

## 2019-04-12 PROBLEM — E83.39 HYPOPHOSPHATEMIA: Status: RESOLVED | Noted: 2018-09-26 | Resolved: 2019-04-12

## 2019-04-12 PROCEDURE — 99202 OFFICE O/P NEW SF 15 MIN: CPT | Performed by: DERMATOLOGY

## 2019-04-12 NOTE — PROGRESS NOTES
CC: dry skin    Subjective: new patient here for dry skin on feet/legs?  Patient couldn't identify reason for visit, but identified that dryness is the only problem affecting skin.  Sometimes bothered by neuropathies - burning pain on standing/walking - managed by PCP.  No neurology doctor at present.  Dryness, no itching.  No trx.    Denies lesions of concern.  No sites burning, bleeding, growing, changing.   No acne.      History of skin cancer: No  History of precancers/actinic keratoses: No  History of biopsies:No  History of blistering/severe sunburns:No  Family history of skin cancer:No  Family history of atypical moles:No    ROS: no fevers/chills. No itch.   DermPMH: no skin cancer/melanoma  No problem-specific Assessment & Plan notes found for this encounter.    Relevant PMH: hx MI last year.  Mult med comorbidities  Social:hx tobacco use    PE: Gen:WDWN male in NAD. Face/eyes/lips - no gross rashes/lesions identified.  Arms/legs - mild xerosis. Rodo of excoriation on right lower shin    A/P: Xerosis, diffuse:  -counseled re: dx/tx  -OTC moisturizers recommended  -reviewed Amlactin cream for feet    I have reviewed medications relevant to my specialty.    F/u PRN

## 2019-09-10 NOTE — DISCHARGE PLANNING
[id number]     PASRR  LOC in Manual Review  
Agency/Facility Name: [facility]  Spoke To: Tiarra   Outcome: Left voice message for Tiarra regarding referral status.     
Agency/Facility Name: [facility]  Spoke To: Tiarra  Outcome: Patient declined, does not feel patient is rehab appropriate.     BC Brown notified.     
Agency/Facility Name: [facility]  Spoke To: Fax notification   Outcome: Patient declined, care exceeds capacity    
Agency/Facility Name: [facility]  Spoke To: Gracie  Outcome: Bed is available for patient today. Samantha to follow up regarding transportation time.     
Agency/Facility Name: [facility]  Spoke To: Samantha  Outcome: Patient accepted.   BC Brown notified.    
Anticipated Discharge Disposition: Snf    Action: Pt lives in an apartment in [place].  PCP is MD Foley.  Pt has friends and family in [place] that help him. Pt is disabled and income is $635 monthly. Pt uses a cane for ambulation and is independent with ADL's.  Pt needs help with IADL's. Neighbors help him with household chores and family members take him to but food.  Pt has RX coverage and gets medications from [facility] pharmacy.    Barriers to Discharge: medical clearance, snf acceptance    Plan: Follow up on snf  
D/C summary sent to Samantha @ [facility] via fax.   
LOC [id number]        
Received Choice form at 7718  Agency/Facility Name: 1. [facility] 2. [facility] 3. [facility]  Referral sent per Choice form @ 6076     
Received Transport Form @ 9673  Spoke to Samantha @ [facility]    Transport is scheduled for 10/5 @1700 (5pm) [facility].    BC Brown notified.     
Walk in

## 2019-11-12 ENCOUNTER — OFFICE VISIT (OUTPATIENT)
Dept: CARDIOLOGY | Facility: MEDICAL CENTER | Age: 59
End: 2019-11-12

## 2019-11-12 ENCOUNTER — TELEPHONE (OUTPATIENT)
Dept: CARDIOLOGY | Facility: MEDICAL CENTER | Age: 59
End: 2019-11-12

## 2019-11-12 ENCOUNTER — NON-PROVIDER VISIT (OUTPATIENT)
Dept: CARDIOLOGY | Facility: MEDICAL CENTER | Age: 59
End: 2019-11-12
Payer: MEDICARE

## 2019-11-12 VITALS
OXYGEN SATURATION: 97 % | WEIGHT: 174 LBS | DIASTOLIC BLOOD PRESSURE: 80 MMHG | SYSTOLIC BLOOD PRESSURE: 130 MMHG | HEART RATE: 80 BPM | HEIGHT: 70 IN | BODY MASS INDEX: 24.91 KG/M2

## 2019-11-12 DIAGNOSIS — I50.20 ACC/AHA STAGE C SYSTOLIC HEART FAILURE (HCC): ICD-10-CM

## 2019-11-12 DIAGNOSIS — I25.5 ISCHEMIC CARDIOMYOPATHY: ICD-10-CM

## 2019-11-12 DIAGNOSIS — E11.01 TYPE 2 DIABETES MELLITUS WITH HYPEROSMOLAR COMA, UNSPECIFIED WHETHER LONG TERM INSULIN USE (HCC): ICD-10-CM

## 2019-11-12 DIAGNOSIS — Z95.810 AICD (AUTOMATIC CARDIOVERTER/DEFIBRILLATOR) PRESENT: ICD-10-CM

## 2019-11-12 DIAGNOSIS — E78.2 MIXED HYPERLIPIDEMIA: ICD-10-CM

## 2019-11-12 DIAGNOSIS — I25.10 CORONARY ARTERY DISEASE INVOLVING NATIVE CORONARY ARTERY OF NATIVE HEART WITHOUT ANGINA PECTORIS: ICD-10-CM

## 2019-11-12 DIAGNOSIS — F17.200 SMOKING: ICD-10-CM

## 2019-11-12 DIAGNOSIS — I50.9 HEART FAILURE, NYHA CLASS 3 (HCC): ICD-10-CM

## 2019-11-12 PROBLEM — Z95.0 CARDIAC PACEMAKER IN SITU: Status: ACTIVE | Noted: 2019-11-12

## 2019-11-12 PROBLEM — E78.5 HYPERLIPIDEMIA: Status: ACTIVE | Noted: 2019-11-12

## 2019-11-12 PROBLEM — D69.6 THROMBOCYTOPENIA (HCC): Status: RESOLVED | Noted: 2018-09-25 | Resolved: 2019-11-12

## 2019-11-12 PROBLEM — E83.42 HYPOMAGNESEMIA: Status: RESOLVED | Noted: 2018-09-26 | Resolved: 2019-11-12

## 2019-11-12 PROBLEM — E87.6 HYPOKALEMIA: Status: RESOLVED | Noted: 2018-09-26 | Resolved: 2019-11-12

## 2019-11-12 PROCEDURE — 93283 PRGRMG EVAL IMPLANTABLE DFB: CPT | Performed by: NURSE PRACTITIONER

## 2019-11-12 PROCEDURE — 99214 OFFICE O/P EST MOD 30 MIN: CPT | Mod: 25 | Performed by: NURSE PRACTITIONER

## 2019-11-12 RX ORDER — NITROGLYCERIN 0.4 MG/1
0.4 TABLET SUBLINGUAL PRN
Qty: 25 TAB | Refills: 3 | Status: SHIPPED | OUTPATIENT
Start: 2019-11-12

## 2019-11-12 RX ORDER — METOPROLOL SUCCINATE 25 MG/1
25 TABLET, EXTENDED RELEASE ORAL DAILY
Qty: 90 TAB | Refills: 3 | Status: SHIPPED | OUTPATIENT
Start: 2019-11-12 | End: 2021-09-24 | Stop reason: SDUPTHER

## 2019-11-12 RX ORDER — WARFARIN SODIUM 7.5 MG/1
7.5 TABLET ORAL
Qty: 90 TAB | Refills: 3 | Status: SHIPPED
Start: 2019-11-12 | End: 2020-03-20

## 2019-11-12 RX ORDER — ATORVASTATIN CALCIUM 80 MG/1
80 TABLET, FILM COATED ORAL EVERY EVENING
Qty: 90 TAB | Refills: 3 | Status: SHIPPED | OUTPATIENT
Start: 2019-11-12 | End: 2022-02-15 | Stop reason: SDUPTHER

## 2019-11-12 RX ORDER — SPIRONOLACTONE 25 MG/1
25 TABLET ORAL DAILY
Qty: 90 TAB | Refills: 3 | Status: SHIPPED | OUTPATIENT
Start: 2019-11-12

## 2019-11-12 RX ORDER — ASPIRIN 81 MG/1
81 TABLET ORAL DAILY
Qty: 90 TAB | Refills: 3 | Status: SHIPPED | OUTPATIENT
Start: 2019-11-12

## 2019-11-12 RX ORDER — LOSARTAN POTASSIUM 25 MG/1
75 TABLET ORAL DAILY
Qty: 90 TAB | Refills: 3 | Status: SHIPPED
Start: 2019-11-12 | End: 2020-06-12

## 2019-11-12 ASSESSMENT — ENCOUNTER SYMPTOMS
ORTHOPNEA: 0
BRUISES/BLEEDS EASILY: 0
CHILLS: 0
FEVER: 0
PALPITATIONS: 0
HEADACHES: 0
ABDOMINAL PAIN: 0
PND: 0
COUGH: 1
NAUSEA: 0
LOSS OF CONSCIOUSNESS: 0
SHORTNESS OF BREATH: 0
INSOMNIA: 0
MYALGIAS: 0
DIZZINESS: 0

## 2019-11-12 NOTE — PROGRESS NOTES
Chief Complaint   Patient presents with   • Follow-Up   • AICD Check/Dysfunction   • Coronary Artery Disease   • Cardiomyopathy (Ischemic)   • CHF (Compensated)   • Hyperlipidemia   • Diabetes (Controlled)   • Nicotine Dependence       Subjective:   Nisa Ross is a 59 y.o. male who presents today for overdue follow-up for CAD/ischemic cardiomyopathy, hypertension, hyperlipidemia, and diabetes mellitus.    Nisa is a 59 year old male with history of CAD/ischemic cardiomyopathy with LVEF 20-25% with AICD implanted in 2018. He was not a candidate for stenting or bypass surgery, given triple vessel disease and infarct/akinesis of heart muscle.  He was last seen a year ago.    He is here today for follow-up. He has not taken any of his medication for several months now, as he ran out of them. He has not had any device therapy. No chest pain, pressure or discomfort; no palpitations; no shortness of breath, but he is still smoking. No dizziness or syncope; no LE edema. He has not taken his Coumadin, nor his insulin.    Past Medical History:   Diagnosis Date   • Acute coronary syndrome (HCC)    • CAD (coronary artery disease) 11/2018    Coronary angiogram with % occluded, previous PTCA of LAD patent, MARLEY 90%, non-amenable to stenting or bypass. October 2018: PTCA to LAD   • Diabetes     type II   • DM (diabetes mellitus) ([facility]) 9/24/2018   • HTN (hypertension), malignant    • Hyperlipidemia    • Ischemic cardiomyopathy 09/2018    Echocardiogram with mild global hypokinesis, LAD severe hypo to akinesis, and LVEF 20-25%   • Rheumatic fever 1974   • Smoking    • STEMI (ST elevation myocardial infarction) ([facility]) 9/24/2018     Past Surgical History:   Procedure Laterality Date   • AICD IMPLANT Left 09/26/2018    [facility] [address] [id number] implanted by Dr. Cole.   • CERVICAL DISK AND FUSION ANTERIOR  4/23/2010    Performed by ESTHER BELL at [facility] ORS   • OTHER ORTHOPEDIC SURGERY   1975    right leg      Family History   Problem Relation Age of Onset   • Cancer Mother    • Heart Disease Brother         CABGx3     Social History     Socioeconomic History   • Marital status: Single     Spouse name: Not on file   • Number of children: Not on file   • Years of education: Not on file   • Highest education level: Not on file   Occupational History   • Not on file   Social Needs   • Financial resource strain: Not on file   • Food insecurity:     Worry: Not on file     Inability: Not on file   • Transportation needs:     Medical: Not on file     Non-medical: Not on file   Tobacco Use   • Smoking status: Former Smoker     Packs/day: 1.00     Years: 40.00     Pack years: 40.00     Types: Cigarettes     Last attempt to quit: 2018     Years since quittin.1   • Smokeless tobacco: Former User     Types: Chew     Quit date: 10/16/2008   • Tobacco comment: 1 pack isabel day and a half   Substance and Sexual Activity   • Alcohol use: Yes     Alcohol/week: 7.2 oz     Types: 12 Cans of beer per week     Comment: 12 pack in a month   • Drug use: No   • Sexual activity: Not on file   Lifestyle   • Physical activity:     Days per week: Not on file     Minutes per session: Not on file   • Stress: Not on file   Relationships   • Social connections:     Talks on phone: Not on file     Gets together: Not on file     Attends Sikh service: Not on file     Active member of club or organization: Not on file     Attends meetings of clubs or organizations: Not on file     Relationship status: Not on file   • Intimate partner violence:     Fear of current or ex partner: Not on file     Emotionally abused: Not on file     Physically abused: Not on file     Forced sexual activity: Not on file   Other Topics Concern   • Not on file   Social History Narrative   • Not on file     No Known Allergies  Outpatient Encounter Medications as of 2019   Medication Sig Dispense Refill   • metoprolol SR (TOPROL XL) 25 MG  TABLET SR 24 HR Take 1 Tab by mouth every day. 90 Tab 3   • spironolactone (ALDACTONE) 25 MG Tab Take 1 Tab by mouth every day. 90 Tab 3   • warfarin (COUMADIN) 7.5 MG Tab Take 1 Tab by mouth COUMADIN-DAILY. 90 Tab 3   • losartan (COZAAR) 25 MG Tab Take 3 Tabs by mouth every day. 90 Tab 3   • atorvastatin (LIPITOR) 80 MG tablet Take 1 Tab by mouth every evening. 90 Tab 3   • aspirin 81 MG EC tablet Take 1 Tab by mouth every day. 90 Tab 3   • nitroglycerin (NITROSTAT) 0.4 MG SL Tab Place 1 Tab under tongue as needed for Chest Pain (up to 3 doses (if SBP greater than 90 mmHg)). 25 Tab 3   • insulin glargine (LANTUS) 100 UNIT/ML Solution Inject 25 Units as instructed every evening. 10 mL    • insulin regular (HUMULIN R) 100 Unit/mL Solution Inject 3-14 Units as instructed 4 Times a Day,Before Meals and at Bedtime. (Patient not taking: Reported on 12/10/2018) 10 mL      No facility-administered encounter medications on file as of 11/12/2019.      Review of Systems   Constitutional: Negative for chills and fever.   HENT: Negative for congestion.    Respiratory: Positive for cough. Negative for shortness of breath.    Cardiovascular: Negative for chest pain, palpitations, orthopnea, leg swelling and PND.   Gastrointestinal: Negative for abdominal pain and nausea.   Musculoskeletal: Negative for myalgias.   Skin: Negative for rash.   Neurological: Negative for dizziness, loss of consciousness and headaches.   Endo/Heme/Allergies: Does not bruise/bleed easily.   Psychiatric/Behavioral: The patient does not have insomnia.         Objective:   There were no vitals taken for this visit.    Physical Exam   Constitutional: He is oriented to person, place, and time. He appears well-developed and well-nourished.   HENT:   Head: Normocephalic.   Eyes: EOM are normal.   Neck: Normal range of motion. Neck supple. No JVD present.   Cardiovascular: Normal rate, regular rhythm and normal heart sounds.   Pulmonary/Chest: Effort normal  and breath sounds normal. No respiratory distress. He has no wheezes. He has no rales.   AICD in left chest wall.   Abdominal: Soft. Bowel sounds are normal. He exhibits no distension. There is no tenderness.   Musculoskeletal: Normal range of motion.         General: No edema.   Neurological: He is alert and oriented to person, place, and time.   Skin: Skin is warm and dry. No rash noted.   Psychiatric: He has a normal mood and affect.     Device is working normally. No device therapy and only one mode switching episode. 10 NSVT episodes.    CONCLUSIONS OF ECHOCARDIOGRAM OF 9/27/2018:  Prior echo 09/26/18, findings are similar  Requested as limited to eval for pericaridal effusion.  LV function about 20-25% visually.  Mild global hypokinesis with LAD territory severe hypo to akinesis.  Trivial pericardial fluid noted.    IMPRESSIONS OF CORONARY ANGIOGRAM 11/30/2018:  1.  Severe left ventricular dysfunction with akinesis of the mid to distal   anterior wall and apex with elevated left ventricular end-diastolic pressure.  2.  Coronary artery disease with:  1.  Complete occlusion of the very proximal right coronary artery with left to   right collateral filling of the distal right coronary artery.  2.  A 60% proximal lesion in the large ramus intermedius.  3.  A 90% or greater lesion in the proximal left anterior descending diagonal.  4.  A 70% lesion in the proximal left anterior descending.  5.  Greater than 95% lesion in the very distal left anterior descending.    Lab Results   Component Value Date/Time    CHOLSTRLTOT 128 09/25/2018 01:28 AM    LDL 85 09/25/2018 01:28 AM    HDL 24 (A) 09/25/2018 01:28 AM    TRIGLYCERIDE 94 09/25/2018 01:28 AM       Lab Results   Component Value Date/Time    SODIUM 133 (L) 12/01/2018 03:11 AM    POTASSIUM 4.1 12/01/2018 03:11 AM    CHLORIDE 102 12/01/2018 03:11 AM    CO2 24 12/01/2018 03:11 AM    GLUCOSE 244 (H) 12/01/2018 03:11 AM    BUN 15 12/01/2018 03:11 AM    CREATININE 0.65  12/01/2018 03:11 AM     Lab Results   Component Value Date/Time    ALKPHOSPHAT 73 11/30/2018 09:50 AM    ASTSGOT 13 11/30/2018 09:50 AM    ALTSGPT 11 11/30/2018 09:50 AM    TBILIRUBIN 1.3 11/30/2018 09:50 AM        Assessment:     1. AICD (automatic cardioverter/defibrillator) present     2. Coronary artery disease involving native coronary artery of native heart without angina pectoris     3. Ischemic cardiomyopathy     4. Mixed hyperlipidemia     5. Type 2 diabetes mellitus with hyperosmolar coma, unspecified whether long term insulin use (HCC)     6. Smoking         Medical Decision Making:  Today's Assessment / Status / Plan:     1. CAD/ischemic cardiomyopathy with LVEF 20-25% on echo in 2018, with AICD. To repeat echocardiogram. Also to resume Coumadin, BB, ARB, Aldactone and statin. He is referred back to [facility], to establish care, and get standing order.    2. Hyperlipidemia, to resume statin.    3. Diabetes mellitus, off of insulin. Needs FU with PCP to renew insulin Rx.    4. Smoking, ongoing. He is urged to keep working at quitting completely.    He is given new Rxs for all medications. FU in 6 months for next AICD check. Echocardiogram as above. Discussed importance of medication compliance for 20-25 minutes.    Collaborating MD: Carlos

## 2019-11-12 NOTE — TELEPHONE ENCOUNTER
PAR sent to plan:  ANNMARIE FRITZ Key: [id number] - Rx #: [id number] Need help? Call us at [phone]   Status   Sent to [facility]   DrugLosartan Potassium 25MG tablets   FormOptumRx Medicare Part D Electronic Prior Authorization Form (2017 NCPDP)   Original Claim [id number] Provide Notice: Medicare Prescription Drug Coverage and Your Rights

## 2019-11-13 ENCOUNTER — TELEPHONE (OUTPATIENT)
Dept: VASCULAR LAB | Facility: MEDICAL CENTER | Age: 59
End: 2019-11-13

## 2019-11-15 ENCOUNTER — ANTICOAGULATION MONITORING (OUTPATIENT)
Dept: VASCULAR LAB | Facility: MEDICAL CENTER | Age: 59
End: 2019-11-15

## 2019-11-15 ENCOUNTER — TELEMEDICINE2 (OUTPATIENT)
Dept: SCHEDULING | Facility: IMAGING CENTER | Age: 59
End: 2019-11-15
Payer: MEDICARE

## 2019-11-15 DIAGNOSIS — I25.5 ISCHEMIC CARDIOMYOPATHY: ICD-10-CM

## 2019-11-15 DIAGNOSIS — F17.200 SMOKING: ICD-10-CM

## 2019-11-15 DIAGNOSIS — I25.10 CORONARY ARTERY DISEASE INVOLVING NATIVE CORONARY ARTERY OF NATIVE HEART WITHOUT ANGINA PECTORIS: ICD-10-CM

## 2019-11-15 DIAGNOSIS — Z95.810 AICD (AUTOMATIC CARDIOVERTER/DEFIBRILLATOR) PRESENT: ICD-10-CM

## 2019-11-15 DIAGNOSIS — E11.01 TYPE 2 DIABETES MELLITUS WITH HYPEROSMOLAR COMA, UNSPECIFIED WHETHER LONG TERM INSULIN USE (HCC): ICD-10-CM

## 2019-11-15 DIAGNOSIS — Z79.01 CHRONIC ANTICOAGULATION: ICD-10-CM

## 2019-11-15 NOTE — PROGRESS NOTES
Pt was a no-show for initial anticoagulation clinic visit via telemed in [place].  Will notify referring provider and have  call to get pt in next wk, if possible.      MAYANK Lezama  [facility]

## 2019-11-18 ENCOUNTER — TELEPHONE (OUTPATIENT)
Dept: VASCULAR LAB | Facility: MEDICAL CENTER | Age: 59
End: 2019-11-18

## 2019-11-18 NOTE — TELEPHONE ENCOUNTER
OP Anticoagulation Telephone Note    Date: 11/18/2019       Plan:  Pt no showed for initial anticoag appt. Called to remind patient please reschedule appt. No VM set up. Will try again at later time    Jenna Victor, Pharm D

## 2019-11-26 ENCOUNTER — TELEPHONE (OUTPATIENT)
Dept: VASCULAR LAB | Facility: MEDICAL CENTER | Age: 59
End: 2019-11-26

## 2019-11-26 NOTE — LETTER
Nisa Ross  [address] [facility] [address]  [place] [address]    Dear Nisa Ross    We have been unsuccessful in our attempts to contact you regarding your Anticoagulation Service appointments.  Warfarin is a potent blood-thinning agent that requires frequent monitoring to measure its level in the blood.  If your level becomes too high, you could develop serious, sometimes life-threatening bleeding problems.  If your level becomes too low, life-threatening blood clots or stroke could result.    It is extremely important that you have your lab work drawn as soon as possible.  Alternatively, you may schedule an appointment for a fingerstick INR at our clinic.  We are open Monday-Friday 8 am until 5 pm.  You may reach our Service at [phone].     To monitor your warfarin level effectively, we need to be able to communicate with you.  This is a requirement to be followed by our Service.  If we are unable to contact you on three separate occasions, you will be discharged from the Anticoagulation Service.     If you repeatedly fail to keep your lab appointments, you are at risk of being discharged from the Service.           Sincerely,           Edward Baltazar, PharmD, [facility]  Pharmacy Clinical Supervisor  [facility]  Outpatient Anticoagulation Service

## 2019-11-26 NOTE — TELEPHONE ENCOUNTER
[facility]    Pt missed initial appointment, unable to leave a VM.  Sent letter of compliance.    Tony Gallo, PharmD

## 2019-12-12 ENCOUNTER — TELEPHONE (OUTPATIENT)
Dept: VASCULAR LAB | Facility: MEDICAL CENTER | Age: 59
End: 2019-12-12

## 2019-12-12 NOTE — TELEPHONE ENCOUNTER
Called regarding anticoagulation referral for warfarin due to Afib from [facility] on 11/12/19    Pt agrees to appt tomorrow for telemed in [place] at 1030a.     The clinic is located in [facility], at [facility]. Please use the underground parking garage at the corner of [address] and [address], and then take the elevator to the second-floor clinic.    [facility]  Locations to be seen: [place] telemed    [facility] at [phone], fax [phone]    Carmina Menon, ConstantinoD

## 2019-12-20 ENCOUNTER — APPOINTMENT (OUTPATIENT)
Dept: SCHEDULING | Facility: IMAGING CENTER | Age: 59
End: 2019-12-20
Payer: MEDICARE

## 2019-12-20 ENCOUNTER — ANTICOAGULATION MONITORING (OUTPATIENT)
Dept: VASCULAR LAB | Facility: MEDICAL CENTER | Age: 59
End: 2019-12-20
Payer: MEDICARE

## 2019-12-20 DIAGNOSIS — I25.10 CORONARY ARTERY DISEASE INVOLVING NATIVE CORONARY ARTERY OF NATIVE HEART WITHOUT ANGINA PECTORIS: ICD-10-CM

## 2019-12-20 DIAGNOSIS — E11.01 TYPE 2 DIABETES MELLITUS WITH HYPEROSMOLAR COMA, UNSPECIFIED WHETHER LONG TERM INSULIN USE (HCC): ICD-10-CM

## 2019-12-20 DIAGNOSIS — I25.5 ISCHEMIC CARDIOMYOPATHY: ICD-10-CM

## 2019-12-20 DIAGNOSIS — F17.200 SMOKING: ICD-10-CM

## 2019-12-20 DIAGNOSIS — Z95.810 AICD (AUTOMATIC CARDIOVERTER/DEFIBRILLATOR) PRESENT: ICD-10-CM

## 2019-12-20 PROBLEM — I26.99 PULMONARY EMBOLISM (HCC): Status: ACTIVE | Noted: 2019-12-20

## 2019-12-20 PROBLEM — I82.409 DEEP VEIN THROMBOSIS (HCC): Status: ACTIVE | Noted: 2019-12-20

## 2019-12-20 LAB — INR PPP: 1.2 (ref 2–3.5)

## 2019-12-20 PROCEDURE — 99213 OFFICE O/P EST LOW 20 MIN: CPT | Performed by: NURSE PRACTITIONER

## 2019-12-20 NOTE — PROGRESS NOTES
OP Anticoagulation Service Note    Date: 2019       Plan: Pt presents today to establish care for warfarin management.  He has been on warfarin for some time; not currently following with any doctors.  Would like to see a cardiologist; will forward his info over for possible telemed or [place] visits, as this is his primary residence. Described our role in his warfarin management as well as prescribing.  He remains on 81mg ASA; med rec reviewed.  Pt is subtherapeutic . Denies any s/s of bleeding or clotting. Denies any changes in medications or diet. Will increase warfarin dosing as follows. Follow up appointment in 1 week(s).   BP: 122/68, HR 82     THIS VISIT HAS BEEN CONDUCTED WITH [employer] UTILIZING SECURE AND ENCRYPTED VIDEO-CONFERENCING EQUIPMENT    Anticoagulation Summary  As of 2019    INR goal:   2.0-3.0   TTR:   0.0 % (3.6 wk)   INR used for dosin.20! (2019)   Warfarin maintenance plan:   15 mg (7.5 mg x 2) every Mon, Wed, Fri; 7.5 mg (7.5 mg x 1) all other days   Weekly warfarin total:   75 mg   Plan last modified:   AUSTIN Bazan (2019)   Next INR check:      Target end date:   Indefinite    Indications    CAD (coronary artery disease) [I25.10]  Type 2 diabetes mellitus (HCC) [E11.9]  Smoking [F17.200]  Ischemic cardiomyopathy [I25.5]  AICD (automatic cardioverter/defibrillator) present [Z95.810]  Pulmonary embolism (HCC) [I26.99]  Deep vein thrombosis (HCC) [I82.409]             Anticoagulation Episode Summary     INR check location:       Preferred lab:       Send INR reminders to:       Comments:   [place] telemed             Review of Systems   HENT: Negative for nosebleeds.   Respiratory: Negative for shortness of breath.  Gastrointestinal: Negative for blood in stool.   Genitourinary: Negative for hematuria.   Psychiatric/Behavioral: The patient is not nervous/anxious.     Physical Exam   Constitutional: Oriented to person,  place, and time. Appears well-developed and well-nourished.   Pulmonary/Chest: Effort normal. No respiratory distress.   Skin: Not diaphoretic.  Psychiatric: Normal mood and affect. Behavior is normal.        MAYANK Lezaam  [facility]

## 2019-12-27 ENCOUNTER — APPOINTMENT (OUTPATIENT)
Dept: SCHEDULING | Facility: IMAGING CENTER | Age: 59
End: 2019-12-27
Payer: MEDICARE

## 2019-12-27 ENCOUNTER — ANTICOAGULATION MONITORING (OUTPATIENT)
Dept: VASCULAR LAB | Facility: MEDICAL CENTER | Age: 59
End: 2019-12-27

## 2019-12-27 VITALS — DIASTOLIC BLOOD PRESSURE: 70 MMHG | SYSTOLIC BLOOD PRESSURE: 120 MMHG | HEART RATE: 65 BPM

## 2019-12-27 DIAGNOSIS — Z95.810 AICD (AUTOMATIC CARDIOVERTER/DEFIBRILLATOR) PRESENT: ICD-10-CM

## 2019-12-27 DIAGNOSIS — I25.5 ISCHEMIC CARDIOMYOPATHY: ICD-10-CM

## 2019-12-27 DIAGNOSIS — F17.200 SMOKING: ICD-10-CM

## 2019-12-27 LAB — INR PPP: 2.7 (ref 2–3.5)

## 2019-12-27 NOTE — PROGRESS NOTES
OP Anticoagulation Service Note    THIS VISIT CONDUCTED WITH [employer] UTILIZING SECURE AND ENCRYPTED VIDEOCONFERENCING EQUIPMENT  ROS:      Anticoagulation Summary  As of 2019    INR goal:   2.0-3.0   TTR:   10.2 % (1.1 mo)   INR used for dosin.70 (2019)   Warfarin maintenance plan:   15 mg (7.5 mg x 2) every Mon, Wed, Fri; 7.5 mg (7.5 mg x 1) all other days   Weekly warfarin total:   75 mg   Plan last modified:   AUSTIN Bazan (2019)   Next INR check:   1/3/2020   Target end date:   Indefinite    Indications    CAD (coronary artery disease) [I25.10]  Type 2 diabetes mellitus (HCC) [E11.9]  Smoking [F17.200]  Ischemic cardiomyopathy [I25.5]  AICD (automatic cardioverter/defibrillator) present [Z95.810]  Pulmonary embolism (HCC) [I26.99]  Deep vein thrombosis (HCC) [I82.409]             Anticoagulation Episode Summary     INR check location:       Preferred lab:       Send INR reminders to:       Comments:   [place] telemed        Anticoagulation Patient Findings                HPI:  Nisa Ross, on anticoagulation therapy with warfarin for PE.  Changes to current medical/health status since last appt: none  Denies signs/symptoms of bleeding and/or thrombosis since the last appt.    Denies any interval changes to diet  Denies any interval changes to medications since last appt.   Denies any complications or cost restrictions with current therapy.     A/P   INR  therapeutic.   Pt is to continue with current warfarin dosing regimen.     Next INR in 1 week(s).    Review all of your home medications and newly ordered medications with your doctor and / or pharmacist. Follow medication instructions as directed by your doctor and / or pharmacist. Please keep your medication list with you and share with your physician. Update the information when medications are discontinued, doses are changed, or new medications (including over-the-counter products) are  added; and carry medication information at all times in the event of emergency situations.      For questions, please contact Outpatient Anticoagulation Service [phone].     Tony Gallo, ConstantinoD

## 2020-01-03 ENCOUNTER — APPOINTMENT (OUTPATIENT)
Dept: SCHEDULING | Facility: IMAGING CENTER | Age: 60
End: 2020-01-03
Payer: MEDICARE

## 2020-01-03 ENCOUNTER — ANTICOAGULATION MONITORING (OUTPATIENT)
Dept: VASCULAR LAB | Facility: MEDICAL CENTER | Age: 60
End: 2020-01-03

## 2020-01-03 VITALS — OXYGEN SATURATION: 99 % | SYSTOLIC BLOOD PRESSURE: 139 MMHG | DIASTOLIC BLOOD PRESSURE: 78 MMHG | HEART RATE: 85 BPM

## 2020-01-03 DIAGNOSIS — Z95.810 AICD (AUTOMATIC CARDIOVERTER/DEFIBRILLATOR) PRESENT: ICD-10-CM

## 2020-01-03 DIAGNOSIS — F17.200 SMOKING: ICD-10-CM

## 2020-01-03 DIAGNOSIS — I25.5 ISCHEMIC CARDIOMYOPATHY: ICD-10-CM

## 2020-01-03 LAB — INR PPP: 1.8 (ref 2–3.5)

## 2020-01-03 NOTE — PROGRESS NOTES
OP Anticoagulation Service Note    THIS VISIT CONDUCTED WITH [employer] UTIL[employer]ING SECURE AND ENCRYPTED VIDEOCONFERENCING EQUIPMENT  ROS:      Anticoagulation Summary  As of 1/3/2020    INR goal:   2.0-3.0   TTR:   22.3 % (1.3 mo)   INR used for dosin.80! (1/3/2020)   Warfarin maintenance plan:   7.5 mg (7.5 mg x 1) every Sun, e, Thu; 15 mg (7.5 mg x 2) all other days   Weekly warfarin total:   82.5 mg   Plan last modified:   Tony Gallo, PharmD (1/3/2020)   Next INR check:   1/10/2020   Target end date:   Indefinite    Indications    CAD (coronary artery disease) [I25.10]  Type 2 diabetes mellitus (HCC) [E11.9]  Smoking [F17.200]  Ischemic cardiomyopathy [I25.5]  AICD (automatic cardioverter/defibrillator) present [Z95.810]  Pulmonary embolism (HCC) [I26.99]  Deep vein thrombosis (HCC) [I82.409]             Anticoagulation Episode Summary     INR check location:       Preferred lab:       Send INR reminders to:       Comments:   [place] telemed        Anticoagulation Patient Findings        HPI:  Nisa Ross, on anticoagulation therapy with warfarin for PE  Changes to current medical/health status since last appt: none  Denies signs/symptoms of bleeding and/or thrombosis since the last appt.    Denies any interval changes to diet  Denies any interval changes to medications since last appt.   Denies any complications or cost restrictions with current therapy.     A/P   INR  SUB-therapeutic.   Begin increased regimen.     Next INR in 1 week(s).    Review all of your home medications and newly ordered medications with your doctor and / or pharmacist. Follow medication instructions as directed by your doctor and / or pharmacist. Please keep your medication list with you and share with your physician. Update the information when medications are discontinued, doses are changed, or new medications (including over-the-counter products) are added; and carry medication information at all  times in the event of emergency situations.      For questions, please contact Outpatient Anticoagulation Service [phone].     Constantino OttoD

## 2020-02-27 ENCOUNTER — TELEPHONE (OUTPATIENT)
Dept: VASCULAR LAB | Facility: MEDICAL CENTER | Age: 60
End: 2020-02-27

## 2020-02-27 NOTE — TELEPHONE ENCOUNTER
Tried to call pt for INR reminder, no VM set up.   Will send letter.     INR   Date Value Ref Range Status   01/03/2020 1.80  Final       Carmina Menon, PharmD

## 2020-03-09 ENCOUNTER — TELEPHONE (OUTPATIENT)
Dept: HEALTH INFORMATION MANAGEMENT | Facility: OTHER | Age: 60
End: 2020-03-09

## 2020-03-09 NOTE — TELEPHONE ENCOUNTER
1. Caller Name: Pt                        Call Back Number: [phone] (home)   [facility] PCP or Specialty Provider:yes        2.  Does patient have any active symptoms of respiratory illness (fever OR cough OR shortness of breath)? Yes, the patient reports the following respiratory symptoms: cough, not sure if he has a fever,     3.  In the last 30 days, has the patient traveled outside of the country OR in a high risk area within the [place] OR have any known contact with someone who has?  Pt disconnected telephone before nurse could review. Nurse tried to call pt back and it went to .    4.  Does patient have any comoribidities? Pt disconnected telephone    5. Disposition:  Pt disconnected telephone      Note routed to PCP:

## 2020-03-20 ENCOUNTER — ANTICOAGULATION MONITORING (OUTPATIENT)
Dept: VASCULAR LAB | Facility: MEDICAL CENTER | Age: 60
End: 2020-03-20
Payer: MEDICARE

## 2020-03-20 ENCOUNTER — APPOINTMENT (OUTPATIENT)
Dept: SCHEDULING | Facility: IMAGING CENTER | Age: 60
End: 2020-03-20
Payer: MEDICARE

## 2020-03-20 DIAGNOSIS — Z95.810 AICD (AUTOMATIC CARDIOVERTER/DEFIBRILLATOR) PRESENT: ICD-10-CM

## 2020-03-20 DIAGNOSIS — F17.200 SMOKING: ICD-10-CM

## 2020-03-20 DIAGNOSIS — I26.99 OTHER PULMONARY EMBOLISM WITHOUT ACUTE COR PULMONALE, UNSPECIFIED CHRONICITY (HCC): ICD-10-CM

## 2020-03-20 DIAGNOSIS — I25.5 ISCHEMIC CARDIOMYOPATHY: ICD-10-CM

## 2020-03-20 LAB — INR PPP: 2 (ref 2–3.5)

## 2020-03-20 PROCEDURE — 99211 OFF/OP EST MAY X REQ PHY/QHP: CPT | Performed by: NURSE PRACTITIONER

## 2020-03-20 RX ORDER — WARFARIN SODIUM 7.5 MG/1
TABLET ORAL
Qty: 190 TAB | Refills: 3 | Status: SHIPPED | OUTPATIENT
Start: 2020-03-20 | End: 2021-11-22

## 2020-03-20 NOTE — PROGRESS NOTES
OP Anticoagulation Service Note    Date: 3/20/2020       Plan: Pt is therapeutic . Denies any s/s of bleeding or clotting. Denies any changes in medications or diet. Will continue warfarin dosing as follows. Follow up appointment in 6 week(s).   BP: 128/77, HR 78, 97%     THIS VISIT HAS BEEN CONDUCTED WITH [employer] UTILIZING SECURE AND ENCRYPTED VIDEO-CONFERENCING EQUIPMENT    Anticoagulation Summary  As of 3/20/2020    INR goal:   2.0-3.0   TTR:   7.5 % (3.9 mo)   INR used for dosin.00 (3/20/2020)   Warfarin maintenance plan:   7.5 mg (7.5 mg x 1) every Sun, Tue, Thu; 15 mg (7.5 mg x 2) all other days   Weekly warfarin total:   82.5 mg   Plan last modified:   Tony Gallo, PharmD (1/3/2020)   Next INR check:   2020   Target end date:   Indefinite    Indications    CAD (coronary artery disease) [I25.10]  Type 2 diabetes mellitus (HCC) [E11.9]  Smoking [F17.200]  Ischemic cardiomyopathy [I25.5]  AICD (automatic cardioverter/defibrillator) present [Z95.810]  Pulmonary embolism (HCC) [I26.99]  Deep vein thrombosis (HCC) [I82.409]             Anticoagulation Episode Summary     INR check location:       Preferred lab:       Send INR reminders to:       Comments:   [place] telemed             Review of Systems   HENT: Negative for nosebleeds.   Respiratory: Negative for shortness of breath.  Gastrointestinal: Negative for blood in stool.   Genitourinary: Negative for hematuria.   Psychiatric/Behavioral: The patient is not nervous/anxious.     Physical Exam   Constitutional: Oriented to person, place, and time. Appears well-developed and well-nourished.   Pulmonary/Chest: Effort normal. No respiratory distress.   Skin: Not diaphoretic.  Psychiatric: Normal mood and affect. Behavior is normal.        MAYANK Lezama  [facility]

## 2020-05-01 ENCOUNTER — ANTICOAGULATION MONITORING (OUTPATIENT)
Dept: VASCULAR LAB | Facility: MEDICAL CENTER | Age: 60
End: 2020-05-01
Payer: MEDICARE

## 2020-05-01 ENCOUNTER — APPOINTMENT (OUTPATIENT)
Dept: SCHEDULING | Facility: IMAGING CENTER | Age: 60
End: 2020-05-01
Payer: MEDICARE

## 2020-05-01 DIAGNOSIS — I25.5 ISCHEMIC CARDIOMYOPATHY: ICD-10-CM

## 2020-05-01 DIAGNOSIS — F17.200 SMOKING: ICD-10-CM

## 2020-05-01 DIAGNOSIS — I26.99 OTHER PULMONARY EMBOLISM WITHOUT ACUTE COR PULMONALE, UNSPECIFIED CHRONICITY (HCC): ICD-10-CM

## 2020-05-01 DIAGNOSIS — Z95.810 AICD (AUTOMATIC CARDIOVERTER/DEFIBRILLATOR) PRESENT: ICD-10-CM

## 2020-05-01 LAB
INR PPP: 1.5 (ref 2–3.5)
INR PPP: 1.5 (ref 2–3.5)

## 2020-05-01 PROCEDURE — 99212 OFFICE O/P EST SF 10 MIN: CPT | Performed by: NURSE PRACTITIONER

## 2020-05-01 NOTE — PROGRESS NOTES
"  OP Anticoagulation Service Note    Date: 2020     Plan: Pt is subtherapeutic.  He states he missed a pill, \"a couple of times,\" within the last 2wks.  Denies any s/s of bleeding or clotting. Denies any changes in medications or diet. Will increase warfarin dosing as follows. Follow up appointment in 2 week(s).   BP: 129/75, HR 86, 98%       FRI: Coumadin 22.5mg ( 3 tabs) for a one time only    THIS VISIT HAS BEEN CONDUCTED WITH [employer] UTILIZING SECURE AND ENCRYPTED VIDEO-CONFERENCING EQUIPMENT    Anticoagulation Summary  As of 2020    INR goal:   2.0-3.0   TTR:   5.5 % (5.3 mo)   INR used for dosin.50! (2020)   Warfarin maintenance plan:   7.5 mg (7.5 mg x 1) every e, Thu; 15 mg (7.5 mg x 2) all other days   Weekly warfarin total:   90 mg   Plan last modified:   Breana Aguila, A.P.NMio (2020)   Next INR check:   5/15/2020   Target end date:   Indefinite    Indications    CAD (coronary artery disease) [I25.10]  Type 2 diabetes mellitus (HCC) [E11.9]  Smoking [F17.200]  Ischemic cardiomyopathy [I25.5]  AICD (automatic cardioverter/defibrillator) present [Z95.810]  Pulmonary embolism (HCC) [I26.99]  Deep vein thrombosis (HCC) [I82.409]             Anticoagulation Episode Summary     INR check location:       Preferred lab:       Send INR reminders to:       Comments:   [place] telemed          Review of Systems   HENT: Negative for nosebleeds.   Respiratory: Negative for shortness of breath.  Gastrointestinal: Negative for blood in stool.   Genitourinary: Negative for hematuria.   Psychiatric/Behavioral: The patient is not nervous/anxious.     Physical Exam   Constitutional: Oriented to person, place, and time. Appears well-developed and well-nourished.   Pulmonary/Chest: Effort normal. No respiratory distress.   Skin: Not diaphoretic.  Psychiatric: Normal mood and affect. Behavior is normal.      MAYANK Lezama  [facility]    "

## 2020-05-15 ENCOUNTER — ANTICOAGULATION MONITORING (OUTPATIENT)
Dept: VASCULAR LAB | Facility: MEDICAL CENTER | Age: 60
End: 2020-05-15
Payer: MEDICARE

## 2020-05-15 ENCOUNTER — APPOINTMENT (OUTPATIENT)
Dept: SCHEDULING | Facility: IMAGING CENTER | Age: 60
End: 2020-05-15
Payer: MEDICARE

## 2020-05-15 DIAGNOSIS — I26.99 OTHER PULMONARY EMBOLISM WITHOUT ACUTE COR PULMONALE, UNSPECIFIED CHRONICITY (HCC): ICD-10-CM

## 2020-05-15 DIAGNOSIS — Z95.810 AICD (AUTOMATIC CARDIOVERTER/DEFIBRILLATOR) PRESENT: ICD-10-CM

## 2020-05-15 DIAGNOSIS — F17.200 SMOKING: ICD-10-CM

## 2020-05-15 DIAGNOSIS — I25.5 ISCHEMIC CARDIOMYOPATHY: ICD-10-CM

## 2020-05-15 LAB — INR PPP: 7.7 (ref 2–3.5)

## 2020-05-15 PROCEDURE — 99212 OFFICE O/P EST SF 10 MIN: CPT | Performed by: NURSE PRACTITIONER

## 2020-05-15 NOTE — PROGRESS NOTES
OP Anticoagulation Service Note    Date: 5/15/2020      Plan: Pt is supratherapeutic . Denies any s/s of bleeding or clotting. He denies any ETOH, ASA, NSAIDs, cranberries or cranberry juice, or changes in medications or diet. Will continue warfarin dosing as follows. Follow up appointment in 3 days via labdraw at [facility].  Pt was advised to seek immediate care for any fall in which he hits his head or any bleeding lasting 30min w/o stop.    BP: 106/64, HR 83, 97%    FRI/SAT: NO COUMADIN  SUN: 7.5MG     THIS VISIT HAS BEEN CONDUCTED WITH [employer] UTILIZING SECURE AND ENCRYPTED VIDEO-CONFERENCING EQUIPMENT    Anticoagulation Summary  As of 5/15/2020    INR goal:   2.0-3.0   TTR:   6.4 % (5.7 mo)   INR used for dosin.70! (5/15/2020)   Warfarin maintenance plan:   7.5 mg (7.5 mg x 1) every Tue, Thu; 15 mg (7.5 mg x 2) all other days   Weekly warfarin total:   90 mg   Plan last modified:   AUSTIN Bazan (2020)   Next INR check:   2020   Target end date:   Indefinite    Indications    CAD (coronary artery disease) [I25.10]  Type 2 diabetes mellitus (HCC) [E11.9]  Smoking [F17.200]  Ischemic cardiomyopathy [I25.5]  AICD (automatic cardioverter/defibrillator) present [Z95.810]  Pulmonary embolism (HCC) [I26.99]  Deep vein thrombosis (HCC) [I82.409]             Anticoagulation Episode Summary     INR check location:       Preferred lab:       Send INR reminders to:       Comments:   [facility] telemed           Review of Systems   HENT: Negative for nosebleeds.   Respiratory: Negative for shortness of breath.  Gastrointestinal: Negative for blood in stool.   Genitourinary: Negative for hematuria.   Psychiatric/Behavioral: The patient is not nervous/anxious.     Physical Exam   Constitutional: Oriented to person, place, and time. Appears well-developed and well-nourished.   Pulmonary/Chest: Effort normal. No respiratory distress.   Skin: Not diaphoretic.  Psychiatric:  Normal mood and affect. Behavior is normal.        MAYANK Lezama  [place] [facility]

## 2020-05-18 ENCOUNTER — TELEPHONE (OUTPATIENT)
Dept: VASCULAR LAB | Facility: MEDICAL CENTER | Age: 60
End: 2020-05-18

## 2020-05-18 DIAGNOSIS — I26.99 OTHER PULMONARY EMBOLISM WITHOUT ACUTE COR PULMONALE, UNSPECIFIED CHRONICITY (HCC): ICD-10-CM

## 2020-05-19 ENCOUNTER — TELEPHONE (OUTPATIENT)
Dept: VASCULAR LAB | Facility: MEDICAL CENTER | Age: 60
End: 2020-05-19

## 2020-05-20 ENCOUNTER — ANTICOAGULATION MONITORING (OUTPATIENT)
Dept: VASCULAR LAB | Facility: MEDICAL CENTER | Age: 60
End: 2020-05-20

## 2020-05-20 DIAGNOSIS — F17.200 SMOKING: ICD-10-CM

## 2020-05-20 DIAGNOSIS — I82.409 DEEP VEIN THROMBOSIS (DVT) OF LOWER EXTREMITY, UNSPECIFIED CHRONICITY, UNSPECIFIED LATERALITY, UNSPECIFIED VEIN (HCC): ICD-10-CM

## 2020-05-20 DIAGNOSIS — I25.5 ISCHEMIC CARDIOMYOPATHY: ICD-10-CM

## 2020-05-20 DIAGNOSIS — I26.99 PULMONARY EMBOLISM, UNSPECIFIED CHRONICITY, UNSPECIFIED PULMONARY EMBOLISM TYPE, UNSPECIFIED WHETHER ACUTE COR PULMONALE PRESENT (HCC): ICD-10-CM

## 2020-05-20 DIAGNOSIS — Z95.810 AICD (AUTOMATIC CARDIOVERTER/DEFIBRILLATOR) PRESENT: ICD-10-CM

## 2020-05-20 LAB — INR PPP: 2.2 (ref 2–3.5)

## 2020-05-20 NOTE — TELEPHONE ENCOUNTER
[facility] [place] [facility]      Called to patient today in regards to above message and to verify he went to [facility] to have INR drawn. Patient confirms going yesterday in the afternoon. Called Vivian for results. They will fax to us and we will call patient upon receiving results.    Ricci MeeksD

## 2020-05-20 NOTE — PROGRESS NOTES
Anticoagulation Summary  As of 2020    INR goal:   2.0-3.0   TTR:   6.5 % (5.8 mo)   INR used for dosin.20 (2020)   Warfarin maintenance plan:   7.5 mg (7.5 mg x 1) every e, u; 15 mg (7.5 mg x 2) all other days   Weekly warfarin total:   90 mg   Plan last modified:   Breana Aguila AMioPMioNMio (2020)   Next INR check:   2020   Target end date:   Indefinite    Indications    CAD (coronary artery disease) [I25.10]  Type 2 diabetes mellitus (HCC) [E11.9]  Smoking [F17.200]  Ischemic cardiomyopathy [I25.5]  AICD (automatic cardioverter/defibrillator) present [Z95.810]  Pulmonary embolism (HCC) [I26.99]  Deep vein thrombosis (HCC) [I82.409]             Anticoagulation Episode Summary     INR check location:       Preferred lab:       Send INR reminders to:       Comments:   Danika telemed        Anticoagulation Patient Findings    Spoke with the patient on the phone today, reporting a therapeutic INR of 2.2. Confirmed the current warfarin dosing regimen and patient compliance. Patient reported that he HELD warfarin Sun & Mon also. He restarted at his normal dose yesterday. Patient denies any interval changes to diet and/or medications. Patient denies any signs/symptoms of bleeding or clotting.  Patient will continue with his current regimen until he retests on Friday via telemedicine appt.     Ricci MeeksD

## 2020-05-22 ENCOUNTER — APPOINTMENT (OUTPATIENT)
Dept: SCHEDULING | Facility: IMAGING CENTER | Age: 60
End: 2020-05-22
Payer: MEDICARE

## 2020-05-22 ENCOUNTER — ANTICOAGULATION MONITORING (OUTPATIENT)
Dept: VASCULAR LAB | Facility: MEDICAL CENTER | Age: 60
End: 2020-05-22
Payer: MEDICARE

## 2020-05-22 DIAGNOSIS — F17.200 SMOKING: ICD-10-CM

## 2020-05-22 DIAGNOSIS — I82.409 DEEP VEIN THROMBOSIS (DVT) OF LOWER EXTREMITY, UNSPECIFIED CHRONICITY, UNSPECIFIED LATERALITY, UNSPECIFIED VEIN (HCC): ICD-10-CM

## 2020-05-22 DIAGNOSIS — I25.5 ISCHEMIC CARDIOMYOPATHY: ICD-10-CM

## 2020-05-22 DIAGNOSIS — Z95.810 AICD (AUTOMATIC CARDIOVERTER/DEFIBRILLATOR) PRESENT: ICD-10-CM

## 2020-05-22 DIAGNOSIS — I26.99 PULMONARY EMBOLISM, UNSPECIFIED CHRONICITY, UNSPECIFIED PULMONARY EMBOLISM TYPE, UNSPECIFIED WHETHER ACUTE COR PULMONALE PRESENT (HCC): ICD-10-CM

## 2020-05-22 LAB — INR PPP: 2.5 (ref 2–3.5)

## 2020-05-22 PROCEDURE — 99212 OFFICE O/P EST SF 10 MIN: CPT | Performed by: NURSE PRACTITIONER

## 2020-05-22 NOTE — PROGRESS NOTES
OP Anticoagulation Service Note    Date: 2020       Plan: Pt is therapeutic . Denies any s/s of bleeding or clotting. Denies any changes in medications or diet. Will continue warfarin dosing as follows. Follow up appointment in 1 week, as he is unable to be seen sooner.  He states he DID NOT miss a dose on Monday, as previously thought.  BP: 117/75, HR 89, 96%     THIS VISIT HAS BEEN CONDUCTED WITH [employer] UT[employer] SECURE AND ENCRYPTED VIDEO-CONFERENCING EQUIPMENT       Anticoagulation Summary  As of 2020    INR goal:   2.0-3.0   TTR:   8.6 % (6 mo)   INR used for dosin.50 (2020)   Warfarin maintenance plan:   7.5 mg (7.5 mg x 1) every Tue, Thu; 15 mg (7.5 mg x 2) all other days   Weekly warfarin total:   90 mg   Plan last modified:   AUSTIN Bazan (2020)   Next INR check:      Target end date:   Indefinite    Indications    CAD (coronary artery disease) [I25.10]  Type 2 diabetes mellitus (HCC) [E11.9]  Smoking [F17.200]  Ischemic cardiomyopathy [I25.5]  AICD (automatic cardioverter/defibrillator) present [Z95.810]  Pulmonary embolism (HCC) [I26.99]  Deep vein thrombosis (HCC) [I82.409]             Anticoagulation Episode Summary     INR check location:       Preferred lab:       Send INR reminders to:       Comments:   Danika telemed          Review of Systems   HENT: Negative for nosebleeds.   Respiratory: Negative for shortness of breath.  Gastrointestinal: Negative for blood in stool.   Genitourinary: Negative for hematuria.   Psychiatric/Behavioral: The patient is not nervous/anxious.     Physical Exam   Constitutional: Oriented to person, place, and time. Appears well-developed and well-nourished.   Pulmonary/Chest: Effort normal. No respiratory distress.   Skin: Not diaphoretic.  Psychiatric: Normal mood and affect. Behavior is normal.        MAYANK Lezama  [place] [facility]

## 2020-05-29 ENCOUNTER — ANTICOAGULATION MONITORING (OUTPATIENT)
Dept: VASCULAR LAB | Facility: MEDICAL CENTER | Age: 60
End: 2020-05-29
Payer: MEDICARE

## 2020-05-29 DIAGNOSIS — I82.409 DEEP VEIN THROMBOSIS (DVT) OF LOWER EXTREMITY, UNSPECIFIED CHRONICITY, UNSPECIFIED LATERALITY, UNSPECIFIED VEIN (HCC): ICD-10-CM

## 2020-05-29 DIAGNOSIS — I25.5 ISCHEMIC CARDIOMYOPATHY: ICD-10-CM

## 2020-05-29 DIAGNOSIS — Z95.810 AICD (AUTOMATIC CARDIOVERTER/DEFIBRILLATOR) PRESENT: ICD-10-CM

## 2020-05-29 DIAGNOSIS — Z79.01 CHRONIC ANTICOAGULATION: ICD-10-CM

## 2020-05-29 DIAGNOSIS — F17.200 SMOKING: ICD-10-CM

## 2020-05-29 DIAGNOSIS — I26.99 PULMONARY EMBOLISM, UNSPECIFIED CHRONICITY, UNSPECIFIED PULMONARY EMBOLISM TYPE, UNSPECIFIED WHETHER ACUTE COR PULMONALE PRESENT (HCC): ICD-10-CM

## 2020-05-29 LAB — INR PPP: 7.9 (ref 2–3.5)

## 2020-05-29 PROCEDURE — 99213 OFFICE O/P EST LOW 20 MIN: CPT | Performed by: NURSE PRACTITIONER

## 2020-05-29 NOTE — PROGRESS NOTES
"  OP Anticoagulation Service Note    Date: 2020    Plan: Pt is now again, supratherapeutic.  He does state he had \"a few beers,\" last night but denies any other change to meds or diet, including NSAIDS or cranberries.   Denies any s/s of bleeding or clotting. We discussed when to seek immediate care, such as a fall in which he hits his head or bleeding 30min w/o stop.  Will HOLD warfarin and f/u in 3 days via labdraw.  SO faxed.  He will also, \"restart\" v8 juice.  BP: 133/80, HR 92, 96%    NO COUMADIN UNTIL INR TEST  AT [facility]     THIS VISIT HAS BEEN CONDUCTED WITH [employer] UTIL[employer]ING SECURE AND ENCRYPTED VIDEO-CONFERENCING EQUIPMENT       Anticoagulation Summary  As of 2020    INR goal:   2.0-3.0   TTR:   8.6 % (6.2 mo)   INR used for dosin.90! (2020)   Warfarin maintenance plan:   7.5 mg (7.5 mg x 1) every day   Weekly warfarin total:   52.5 mg   Plan last modified:   AUSTIN Bazan (2020)   Next INR check:      Target end date:   Indefinite    Indications    CAD (coronary artery disease) [I25.10]  Type 2 diabetes mellitus (HCC) [E11.9]  Smoking [F17.200]  Ischemic cardiomyopathy [I25.5]  AICD (automatic cardioverter/defibrillator) present [Z95.810]  Pulmonary embolism (HCC) [I26.99]  Deep vein thrombosis (HCC) [I82.409]             Anticoagulation Episode Summary     INR check location:       Preferred lab:       Send INR reminders to:       Comments:   Danika telemed          Review of Systems   HENT: Negative for nosebleeds.   Respiratory: Negative for shortness of breath.  Gastrointestinal: Negative for blood in stool.   Genitourinary: Negative for hematuria.   Psychiatric/Behavioral: The patient is not nervous/anxious.     Physical Exam   Constitutional: Oriented to person, place, and time. Appears well-developed and well-nourished.   Pulmonary/Chest: Effort normal. No respiratory distress.   Skin: Not diaphoretic.  Psychiatric: Normal " mood and affect. Behavior is normal.        MAYANK Lezama  [place] [facility]

## 2020-06-01 ENCOUNTER — ANTICOAGULATION MONITORING (OUTPATIENT)
Dept: VASCULAR LAB | Facility: MEDICAL CENTER | Age: 60
End: 2020-06-01

## 2020-06-01 DIAGNOSIS — Z95.810 AICD (AUTOMATIC CARDIOVERTER/DEFIBRILLATOR) PRESENT: ICD-10-CM

## 2020-06-01 DIAGNOSIS — I82.409 DEEP VEIN THROMBOSIS (DVT) OF LOWER EXTREMITY, UNSPECIFIED CHRONICITY, UNSPECIFIED LATERALITY, UNSPECIFIED VEIN (HCC): ICD-10-CM

## 2020-06-01 DIAGNOSIS — I26.99 PULMONARY EMBOLISM, UNSPECIFIED CHRONICITY, UNSPECIFIED PULMONARY EMBOLISM TYPE, UNSPECIFIED WHETHER ACUTE COR PULMONALE PRESENT (HCC): ICD-10-CM

## 2020-06-01 DIAGNOSIS — I25.5 ISCHEMIC CARDIOMYOPATHY: ICD-10-CM

## 2020-06-01 DIAGNOSIS — Z79.01 CHRONIC ANTICOAGULATION: ICD-10-CM

## 2020-06-01 DIAGNOSIS — F17.200 SMOKING: ICD-10-CM

## 2020-06-01 LAB — INR PPP: 7.5 (ref 2–3.5)

## 2020-06-01 NOTE — PROGRESS NOTES
Anticoagulation Summary  As of 2020    INR goal:   2.0-3.0   TTR:   8.5 % (6.3 mo)   INR used for dosin.50! (2020)   Warfarin maintenance plan:   7.5 mg (7.5 mg x 1) every day   Weekly warfarin total:   52.5 mg   Plan last modified:   AUSTIN Bazan (2020)   Next INR check:   2020   Target end date:   Indefinite    Indications    CAD (coronary artery disease) [I25.10]  Type 2 diabetes mellitus (HCC) [E11.9]  Smoking [F17.200]  Ischemic cardiomyopathy [I25.5]  AICD (automatic cardioverter/defibrillator) present [Z95.810]  Pulmonary embolism (HCC) [I26.99]  Deep vein thrombosis (HCC) [I82.409]             Anticoagulation Episode Summary     INR check location:       Preferred lab:       Send INR reminders to:       Comments:   [place] telemed        Anticoagulation Patient Findings  Patient Findings     Positives:   Change in alcohol use (2 beers on his bday ()), Change in diet/appetite (eating less vegetables, started drinking v8 juice)    Negatives:   Signs/symptoms of thrombosis, Signs/symptoms of bleeding, Laboratory test error suspected, Change in health, Change in activity, Upcoming invasive procedure, Emergency department visit, Upcoming dental procedure, Missed doses, Extra doses, Change in medications, Hospital admission, Bruising, Other complaints          Spoke with pt.  INR is supratherapeutic despite holding for 3 days.   Pt denies any unusual s/s of bleeding, bruising, clotting or any changes to medications. Denies any cranberries, supplements, or illness.   Pt verifies warfarin weekly dosing.     Will have pt hold x 3 days  Continue drinking v8 juice    Repeat INR in 3 days. Will order CMP to monitor LFTs     Ksenia Perez, ConstantinoD

## 2020-06-04 ENCOUNTER — ANTICOAGULATION MONITORING (OUTPATIENT)
Dept: VASCULAR LAB | Facility: MEDICAL CENTER | Age: 60
End: 2020-06-04

## 2020-06-04 ENCOUNTER — APPOINTMENT (OUTPATIENT)
Dept: CARDIOLOGY | Facility: PHYSICIAN GROUP | Age: 60
End: 2020-06-04
Payer: MEDICARE

## 2020-06-04 DIAGNOSIS — I25.5 ISCHEMIC CARDIOMYOPATHY: ICD-10-CM

## 2020-06-04 DIAGNOSIS — Z95.810 AICD (AUTOMATIC CARDIOVERTER/DEFIBRILLATOR) PRESENT: ICD-10-CM

## 2020-06-04 DIAGNOSIS — I82.409 DEEP VEIN THROMBOSIS (DVT) OF LOWER EXTREMITY, UNSPECIFIED CHRONICITY, UNSPECIFIED LATERALITY, UNSPECIFIED VEIN (HCC): ICD-10-CM

## 2020-06-04 DIAGNOSIS — I26.99 PULMONARY EMBOLISM, UNSPECIFIED CHRONICITY, UNSPECIFIED PULMONARY EMBOLISM TYPE, UNSPECIFIED WHETHER ACUTE COR PULMONALE PRESENT (HCC): ICD-10-CM

## 2020-06-04 DIAGNOSIS — F17.200 SMOKING: ICD-10-CM

## 2020-06-04 LAB — INR PPP: 1 (ref 2–3.5)

## 2020-06-04 NOTE — PROGRESS NOTES
OP   Telephone Anticoagulation Service Note      Anticoagulation Summary  As of 2020    INR goal:   2.0-3.0   TTR:   8.6 % (6.4 mo)   INR used for dosin.00! (2020)   Warfarin maintenance plan:   7.5 mg (7.5 mg x 1) every day   Weekly warfarin total:   52.5 mg   Plan last modified:   AUSTIN Bazan (2020)   Next INR check:   2020   Target end date:   Indefinite    Indications    CAD (coronary artery disease) [I25.10]  Type 2 diabetes mellitus (HCC) [E11.9]  Smoking [F17.200]  Ischemic cardiomyopathy [I25.5]  AICD (automatic cardioverter/defibrillator) present [Z95.810]  Pulmonary embolism (HCC) [I26.99]  Deep vein thrombosis (HCC) [I82.409]             Anticoagulation Episode Summary     INR check location:       Preferred lab:       Send INR reminders to:       Comments:   Danika telemed        Anticoagulation Patient Findings       Spoke with the patient on the phone today, reporting a SUB-therapeutic INR of 1.0, subsequent to 2 critically high INR readings. Patient had been HOLDing warfarin x 6 days according to our records and drinking V8. Patient was supposed to have CMP drawn along with INR, but it was not done. Patient not a good historian and does not seem compliant.  Patient denies any interval changes to diet and/or medications. Patient denies any signs/symptoms of bleeding or clotting.  Patient instructed to bolus with 15mg TONIGHT, and again next Tuesday,  And 7.5mg ROW. Patient will follow up in telemedicine appt on Friday, 20.     Ricci MeeksD

## 2020-06-11 ENCOUNTER — TELEPHONE (OUTPATIENT)
Dept: CARDIOLOGY | Facility: MEDICAL CENTER | Age: 60
End: 2020-06-11

## 2020-06-11 NOTE — TELEPHONE ENCOUNTER
Called patient to see if he has had the chance to complete fasting blood work previously ordered by AB to get records prior to appt with [facility]. States he completed labs at [facility], faxed STAT records request ([phone]: [phone], [phone]: [phone]). Fax confirmation received.

## 2020-06-12 ENCOUNTER — TELEMEDICINE (OUTPATIENT)
Dept: CARDIOLOGY | Facility: MEDICAL CENTER | Age: 60
End: 2020-06-12
Payer: MEDICARE

## 2020-06-12 ENCOUNTER — APPOINTMENT (OUTPATIENT)
Dept: SCHEDULING | Facility: IMAGING CENTER | Age: 60
End: 2020-06-12
Payer: MEDICARE

## 2020-06-12 ENCOUNTER — ANTICOAGULATION MONITORING (OUTPATIENT)
Dept: VASCULAR LAB | Facility: MEDICAL CENTER | Age: 60
End: 2020-06-12
Payer: MEDICARE

## 2020-06-12 VITALS
BODY MASS INDEX: 23.62 KG/M2 | HEART RATE: 71 BPM | HEIGHT: 70 IN | WEIGHT: 165 LBS | DIASTOLIC BLOOD PRESSURE: 73 MMHG | SYSTOLIC BLOOD PRESSURE: 110 MMHG

## 2020-06-12 DIAGNOSIS — I50.20 ACC/AHA STAGE C SYSTOLIC HEART FAILURE (HCC): ICD-10-CM

## 2020-06-12 DIAGNOSIS — I25.5 ISCHEMIC CARDIOMYOPATHY: ICD-10-CM

## 2020-06-12 DIAGNOSIS — F17.200 SMOKING: ICD-10-CM

## 2020-06-12 DIAGNOSIS — E78.2 MIXED HYPERLIPIDEMIA: ICD-10-CM

## 2020-06-12 DIAGNOSIS — I44.2 COMPLETE HEART BLOCK BY ELECTROCARDIOGRAM (HCC): ICD-10-CM

## 2020-06-12 DIAGNOSIS — I25.10 CORONARY ARTERY DISEASE INVOLVING NATIVE HEART WITHOUT ANGINA PECTORIS, UNSPECIFIED VESSEL OR LESION TYPE: ICD-10-CM

## 2020-06-12 DIAGNOSIS — Z95.810 AICD (AUTOMATIC CARDIOVERTER/DEFIBRILLATOR) PRESENT: ICD-10-CM

## 2020-06-12 LAB — INR PPP: 3.5 (ref 2–3.5)

## 2020-06-12 PROCEDURE — 99212 OFFICE O/P EST SF 10 MIN: CPT | Performed by: NURSE PRACTITIONER

## 2020-06-12 PROCEDURE — 99214 OFFICE O/P EST MOD 30 MIN: CPT | Mod: 95,CR | Performed by: INTERNAL MEDICINE

## 2020-06-12 RX ORDER — LOSARTAN POTASSIUM 100 MG/1
100 TABLET ORAL DAILY
Qty: 30 TAB | Refills: 6 | Status: SHIPPED | OUTPATIENT
Start: 2020-06-12 | End: 2022-02-15 | Stop reason: SDUPTHER

## 2020-06-12 RX ORDER — WARFARIN SODIUM 7.5 MG/1
TABLET ORAL
COMMUNITY
Start: 2020-03-20 | End: 2020-06-12

## 2020-06-12 ASSESSMENT — ENCOUNTER SYMPTOMS
NEAR-SYNCOPE: 0
DEPRESSION: 0
FEVER: 0
SHORTNESS OF BREATH: 0
CONSTIPATION: 0
SYNCOPE: 0
DIARRHEA: 0
DECREASED APPETITE: 0
IRREGULAR HEARTBEAT: 0
WEIGHT LOSS: 0
WEIGHT GAIN: 0
BLURRED VISION: 0
ORTHOPNEA: 0
CLAUDICATION: 0
ABDOMINAL PAIN: 0
DYSPNEA ON EXERTION: 0
DIZZINESS: 0
HEARTBURN: 0
ALTERED MENTAL STATUS: 0
COUGH: 0
PND: 0
NAUSEA: 0
FLANK PAIN: 0
PALPITATIONS: 0
VOMITING: 0
BACK PAIN: 0

## 2020-06-12 ASSESSMENT — FIBROSIS 4 INDEX: FIB4 SCORE: 1.16

## 2020-06-12 NOTE — PROGRESS NOTES
OP Anticoagulation Service Note    Date: 6/12/2020     Plan: Pt is supratherapeutic . Denies any s/s of bleeding or clotting. Denies any changes in medications or diet. Will continue warfarin dosing as follows. Follow up appointment in 1 week(s).   BP: 110/73, HR 71, 96%     THIS VISIT HAS BEEN CONDUCTED WITH [employer] UTIL[employer]ING SECURE AND ENCRYPTED VIDEO-CONFERENCING EQUIPMENT       Anticoagulation Summary  As of 6/12/2020    INR goal:   2.0-3.0   TTR:   9.9 % (6.7 mo)   INR used for dosing:   3.50! (6/12/2020)   Warfarin maintenance plan:   7.5 mg (7.5 mg x 1) every day   Weekly warfarin total:   52.5 mg   Plan last modified:   AUSTIN Bazan (5/29/2020)   Next INR check:   6/19/2020   Target end date:   Indefinite    Indications    CAD (coronary artery disease) [I25.10]  Type 2 diabetes mellitus (HCC) [E11.9]  Smoking [F17.200]  Ischemic cardiomyopathy [I25.5]  AICD (automatic cardioverter/defibrillator) present [Z95.810]  Pulmonary embolism (HCC) [I26.99]  Deep vein thrombosis (HCC) [I82.409]             Anticoagulation Episode Summary     INR check location:       Preferred lab:       Send INR reminders to:       Comments:   Danika telemed          Review of Systems   HENT: Negative for nosebleeds.   Respiratory: Negative for shortness of breath.  Gastrointestinal: Negative for blood in stool.   Genitourinary: Negative for hematuria.   Psychiatric/Behavioral: The patient is not nervous/anxious.     Physical Exam   Constitutional: Oriented to person, place, and time. Appears well-developed and well-nourished.   Pulmonary/Chest: Effort normal. No respiratory distress.   Skin: Not diaphoretic.  Psychiatric: Normal mood and affect. Behavior is normal.        MAYANK Lezama  [place] [facility]

## 2020-06-12 NOTE — PROGRESS NOTES
This encounter was conducted via [employer].  Verbal consent was obtained. Patient's identity was verified.    Cardiology Note    Chief Complaint   Patient presents with   • Congestive Heart Failure       History of Present Illness: Nisa Ross is a 60 y.o. male PMH hx PE on coumadin, CAD, ICM HFrEF 15% previously deemed not candidate for revascularization s/p ICD 2018, HTN, HLD, DM2 who presents for follow up.    No complaints this visit. Does not exert very much. Sleeps supine without orthopnea. No leg swelling or weight gain. No other active cardiac complaints. He is compliant with medications but poor insight into what each one does. INR followed by coumadin clinic.     Review of Systems   Constitution: Negative for decreased appetite, fever, malaise/fatigue, weight gain and weight loss.   HENT: Negative for congestion and nosebleeds.    Eyes: Negative for blurred vision.   Cardiovascular: Negative for chest pain, claudication, dyspnea on exertion, irregular heartbeat, leg swelling, near-syncope, orthopnea, palpitations, paroxysmal nocturnal dyspnea and syncope.   Respiratory: Negative for cough and shortness of breath.    Endocrine: Negative for cold intolerance and heat intolerance.   Skin: Negative for rash.   Musculoskeletal: Negative for back pain.   Gastrointestinal: Negative for abdominal pain, constipation, diarrhea, heartburn, melena, nausea and vomiting.   Genitourinary: Negative for dysuria, flank pain and hematuria.   Neurological: Negative for dizziness.   Psychiatric/Behavioral: Negative for altered mental status and depression.         Past Medical History:   Diagnosis Date   • Acute coronary syndrome (HCC)    • CAD (coronary artery disease) 11/2018    Coronary angiogram with % occluded, previous PTCA of LAD patent, MARLEY 90%, non-amenable to stenting or bypass. October 2018: PTCA to LAD   • Diabetes     type II   • DM (diabetes mellitus) (HCC) 9/24/2018   • HTN (hypertension),  malignant    • Hyperlipidemia    • Ischemic cardiomyopathy 09/2018    Echocardiogram with mild global hypokinesis, LAD severe hypo to akinesis, and LVEF 20-25%   • Rheumatic fever 1974   • Smoking    • STEMI (ST elevation myocardial infarction) (HCC) 9/24/2018         Past Surgical History:   Procedure Laterality Date   • AICD IMPLANT Left 09/26/2018    [facility] [address] [id number] implanted by Dr. Cole.   • CERVICAL DISK AND FUSION ANTERIOR  4/23/2010    Performed by ESTHER BELL at SURGERY [facility]   • OTHER ORTHOPEDIC SURGERY  1975    right leg          Current Outpatient Medications   Medication Sig Dispense Refill   • losartan (COZAAR) 100 MG Tab Take 1 Tab by mouth every day. 30 Tab 6   • warfarin (COUMADIN) 7.5 MG Tab Take 1-2 tabs po q day or as directed by clinic 190 Tab 3   • metoprolol SR (TOPROL XL) 25 MG TABLET SR 24 HR Take 1 Tab by mouth every day. 90 Tab 3   • spironolactone (ALDACTONE) 25 MG Tab Take 1 Tab by mouth every day. 90 Tab 3   • atorvastatin (LIPITOR) 80 MG tablet Take 1 Tab by mouth every evening. 90 Tab 3   • aspirin 81 MG EC tablet Take 1 Tab by mouth every day. 90 Tab 3   • nitroglycerin (NITROSTAT) 0.4 MG SL Tab Place 1 Tab under tongue as needed for Chest Pain (up to 3 doses (if SBP greater than 90 mmHg)). 25 Tab 3     No current facility-administered medications for this visit.          No Known Allergies      Family History   Problem Relation Age of Onset   • Cancer Mother    • Heart Disease Brother         CABGx3         Social History     Socioeconomic History   • Marital status: Single     Spouse name: Not on file   • Number of children: Not on file   • Years of education: Not on file   • Highest education level: Not on file   Occupational History   • Not on file   Social Needs   • Financial resource strain: Not on file   • Food insecurity     Worry: Not on file     Inability: Not on file   • Transportation needs     Medical: Not on file     Non-medical:  "Not on file   Tobacco Use   • Smoking status: Former Smoker     Packs/day: 1.00     Years: 40.00     Pack years: 40.00     Types: Cigarettes     Last attempt to quit: 2018     Years since quittin.7   • Smokeless tobacco: Former User     Types: Chew     Quit date: 10/16/2008   • Tobacco comment: 1 pack isabel day and a half   Substance and Sexual Activity   • Alcohol use: Yes     Alcohol/week: 7.2 oz     Types: 12 Cans of beer per week     Comment: 12 pack in a month   • Drug use: No   • Sexual activity: Not on file   Lifestyle   • Physical activity     Days per week: Not on file     Minutes per session: Not on file   • Stress: Not on file   Relationships   • Social connections     Talks on phone: Not on file     Gets together: Not on file     Attends Denominational service: Not on file     Active member of club or organization: Not on file     Attends meetings of clubs or organizations: Not on file     Relationship status: Not on file   • Intimate partner violence     Fear of current or ex partner: Not on file     Emotionally abused: Not on file     Physically abused: Not on file     Forced sexual activity: Not on file   Other Topics Concern   • Not on file   Social History Narrative   • Not on file         Physical Exam:  Ambulatory Vitals  /73   Pulse 71   Ht 1.778 m (5' 10\")   Wt 74.8 kg (165 lb)    BP Readings from Last 4 Encounters:   20 110/73   20 139/78   19 120/70   19 130/80     Weight/BMI:   Vitals:    20 1532   BP: 110/73   Weight: 74.8 kg (165 lb)   Height: 1.778 m (5' 10\")    Body mass index is 23.68 kg/m².  Wt Readings from Last 4 Encounters:   20 74.8 kg (165 lb)   19 78.9 kg (174 lb)   18 79.2 kg (174 lb 9.7 oz)   18 74.4 kg (164 lb)       Physical Exam  Physical Exam:  Constitutional: Alert, no distress, well-groomed.  Skin: No rashes in visible areas.  Eye: Round. Conjunctiva clear, lids normal. No icterus.   ENMT: Lips pink without " lesions, good dentition, moist mucous membranes. Phonation normal.  Neck: No masses, no thyromegaly. Moves freely without pain.  CV: Pulse as reported by patient  Respiratory: Unlabored respiratory effort, no cough or audible wheeze  Psych: Alert and oriented x3, normal affect and mood.     Lab Data Review:  Lab Results   Component Value Date/Time    CHOLSTRLTOT 128 09/25/2018 01:28 AM    LDL 85 09/25/2018 01:28 AM    HDL 24 (A) 09/25/2018 01:28 AM    TRIGLYCERIDE 94 09/25/2018 01:28 AM       Lab Results   Component Value Date/Time    SODIUM 133 (L) 12/01/2018 03:11 AM    POTASSIUM 4.1 12/01/2018 03:11 AM    CHLORIDE 102 12/01/2018 03:11 AM    CO2 24 12/01/2018 03:11 AM    GLUCOSE 244 (H) 12/01/2018 03:11 AM    BUN 15 12/01/2018 03:11 AM    CREATININE 0.65 12/01/2018 03:11 AM     CrCl cannot be calculated (Patient's most recent lab result is older than the maximum 7 days allowed.).  Lab Results   Component Value Date/Time    ALKPHOSPHAT 73 11/30/2018 09:50 AM    ASTSGOT 13 11/30/2018 09:50 AM    ALTSGPT 11 11/30/2018 09:50 AM    TBILIRUBIN 1.3 11/30/2018 09:50 AM      Lab Results   Component Value Date/Time    WBC 8.6 11/30/2018 09:50 AM     Lab Results   Component Value Date/Time    HBA1C 12.3 (H) 09/24/2018 09:00 PM     No components found for: TROP      Cardiac Imaging and Procedures Review:      EKG 11/30/2018 sinus, first degree avb, RBBB, anteroseptal infarct    TTE 9/2018  CONCLUSIONS  No prior study is available for comparison.   Severely reduced left ventricular systolic function. Left ventricular   ejection fraction is visually estimated to be 15%.  Only preserved wall motion at the basal anterior wall segment.  No evidence of valvular abnormality based on Doppler evaluation.   Left Ventricle  Normal left ventricular chamber size. Normal left ventricular wall   thickness. Severely reduced left ventricular systolic function. Left   ventricular ejection fraction is visually estimated to be 15%. Only    preserved wall motion at the basal anterior wall segment. Diastolic   function is difficult to assess.    TTE 9/2018  CONCLUSIONS  Prior echo 09/26/18, findings are similar  Requested as limited to eval for pericaridal effusion.  LV function about 20-25% visually.  Mild global hypokinesis with LAD territory severe hypo to akinesis.  Trivial pericardial fluid noted.    [facility] 11/2018  IMPRESSIONS:  1.  Severe left ventricular dysfunction with akinesis of the mid to distal   anterior wall and apex with elevated left ventricular end-diastolic pressure.  2.  Coronary artery disease with:  1.  Complete occlusion of the very proximal right coronary artery with left to   right collateral filling of the distal right coronary artery.  2.  A 60% proximal lesion in the large ramus intermedius.  3.  A 90% or greater lesion in the proximal left anterior descending diagonal.  4.  A 70% lesion in the proximal left anterior descending.  5.  Greater than 95% lesion in the very distal left anterior descending.     As noted above, the angiograms were reviewed in the cath lab with   cardiovascular surgery and it is felt the patient was not an appropriate   candidate for intervention on the LAD, as the area appears to be totally   infarcted and akinetic.  Recommend continued medical therapy.    Medical Decision Making:  Problem List Items Addressed This Visit     CAD (coronary artery disease)    Relevant Medications    losartan (COZAAR) 100 MG Tab    Other Relevant Orders    LIPID PANEL    RESOLVED: Complete heart block by electrocardiogram (HCC)    AICD (automatic cardioverter/defibrillator) present    Ischemic cardiomyopathy    Relevant Medications    losartan (COZAAR) 100 MG Tab    Hyperlipidemia    Relevant Medications    losartan (COZAAR) 100 MG Tab    ACC/AHA stage C systolic heart failure (HCC)    Relevant Medications    losartan (COZAAR) 100 MG Tab    Other Relevant Orders    EC-ECHOCARDIOGRAM COMPLETE W/O CONT        HFrEF 20%  ICM NYHA II, stage C - not revascularized as was a poor candidate. Titrate HF-OMT. Repeat TTE    ICD - continue regular interrogations.    CAD - continue aspirin. Also on coumadin for PE. Recheck lipids    HLD - goal LDL <70. Recheck.     It was my pleasure to meet with Mr. Ross.

## 2020-06-19 ENCOUNTER — APPOINTMENT (OUTPATIENT)
Dept: SCHEDULING | Facility: IMAGING CENTER | Age: 60
End: 2020-06-19
Payer: MEDICARE

## 2020-06-19 ENCOUNTER — ANTICOAGULATION MONITORING (OUTPATIENT)
Dept: VASCULAR LAB | Facility: MEDICAL CENTER | Age: 60
End: 2020-06-19
Payer: MEDICARE

## 2020-06-19 DIAGNOSIS — Z95.810 AICD (AUTOMATIC CARDIOVERTER/DEFIBRILLATOR) PRESENT: ICD-10-CM

## 2020-06-19 DIAGNOSIS — I25.5 ISCHEMIC CARDIOMYOPATHY: ICD-10-CM

## 2020-06-19 DIAGNOSIS — I25.10 CORONARY ARTERY DISEASE INVOLVING NATIVE HEART WITHOUT ANGINA PECTORIS, UNSPECIFIED VESSEL OR LESION TYPE: ICD-10-CM

## 2020-06-19 DIAGNOSIS — F17.200 SMOKING: ICD-10-CM

## 2020-06-19 LAB — INR PPP: 4.7 (ref 2–3.5)

## 2020-06-19 PROCEDURE — 99212 OFFICE O/P EST SF 10 MIN: CPT | Performed by: NURSE PRACTITIONER

## 2020-06-19 NOTE — PROGRESS NOTES
OP Anticoagulation Service Note    Date: 2020     Plan: Pt is supratherapeutic . Denies any s/s of bleeding or clotting. Denies any changes in medications or diet. ETOH?  Will continue to decrease warfarin dosing as follows. Follow up appointment in 1 week(s).   BP: 117/70, HR 76, 97%     THIS VISIT HAS BEEN CONDUCTED WITH [employer] UTILIZING SECURE AND ENCRYPTED VIDEO-CONFERENCING EQUIPMENT    Anticoagulation Summary  As of 2020    INR goal:   2.0-3.0   TTR:   9.5 % (6.9 mo)   INR used for dosin.70! (2020)   Warfarin maintenance plan:   3.75 mg (7.5 mg x 0.5) every Mon, Wed, Fri; 7.5 mg (7.5 mg x 1) all other days   Weekly warfarin total:   41.25 mg   Plan last modified:   DENISE BazanPDEREK (2020)   Next INR check:   2020   Target end date:   Indefinite    Indications    CAD (coronary artery disease) [I25.10]  Type 2 diabetes mellitus (HCC) [E11.9]  Smoking [F17.200]  Ischemic cardiomyopathy [I25.5]  AICD (automatic cardioverter/defibrillator) present [Z95.810]  Pulmonary embolism (HCC) [I26.99]  Deep vein thrombosis (HCC) [I82.409]             Anticoagulation Episode Summary     INR check location:       Preferred lab:       Send INR reminders to:       Comments:   [facility] tele[facility]             Review of Systems   HENT: Negative for nosebleeds.   Respiratory: Negative for shortness of breath.  Gastrointestinal: Negative for blood in stool.   Genitourinary: Negative for hematuria.   Psychiatric/Behavioral: The patient is not nervous/anxious.     Physical Exam   Constitutional: Oriented to person, place, and time. Appears well-developed and well-nourished.   Pulmonary/Chest: Effort normal. No respiratory distress.   Skin: Not diaphoretic.  Psychiatric: Normal mood and affect. Behavior is normal.          MAYANK Lezama  [facility]

## 2020-06-26 ENCOUNTER — ANTICOAGULATION MONITORING (OUTPATIENT)
Dept: VASCULAR LAB | Facility: MEDICAL CENTER | Age: 60
End: 2020-06-26
Payer: MEDICARE

## 2020-06-26 ENCOUNTER — APPOINTMENT (OUTPATIENT)
Dept: SCHEDULING | Facility: IMAGING CENTER | Age: 60
End: 2020-06-26
Payer: MEDICARE

## 2020-06-26 DIAGNOSIS — Z95.810 AICD (AUTOMATIC CARDIOVERTER/DEFIBRILLATOR) PRESENT: ICD-10-CM

## 2020-06-26 DIAGNOSIS — F17.200 SMOKING: ICD-10-CM

## 2020-06-26 DIAGNOSIS — I25.5 ISCHEMIC CARDIOMYOPATHY: ICD-10-CM

## 2020-06-26 DIAGNOSIS — I25.10 CORONARY ARTERY DISEASE INVOLVING NATIVE HEART WITHOUT ANGINA PECTORIS, UNSPECIFIED VESSEL OR LESION TYPE: ICD-10-CM

## 2020-06-26 LAB — INR PPP: 1.7 (ref 2–3.5)

## 2020-06-26 PROCEDURE — 99212 OFFICE O/P EST SF 10 MIN: CPT | Performed by: NURSE PRACTITIONER

## 2020-06-26 NOTE — PROGRESS NOTES
OP Anticoagulation Service Note    Date: 2020       Plan: Pt is subtherapeutic, after holding a dose and dose decrease last wk . Denies any s/s of bleeding or clotting. Denies any changes in medications or diet. Will continue warfarin dosing as follows. Follow up appointment in 2 weeks.  Echo order printed for pt by [facility] staff for pt to sched today, per cards.  BP: 104/63, HR 70, 98%     THIS VISIT HAS BEEN CONDUCTED WITH [employer] UTIL[employer]ING SECURE AND ENCRYPTED VIDEO-CONFERENCING EQUIPMENT    Anticoagulation Summary  As of 2020    INR goal:   2.0-3.0   TTR:   10.3 % (7.1 mo)   INR used for dosin.70! (2020)   Warfarin maintenance plan:   3.75 mg (7.5 mg x 0.5) every Mon, Wed, Fri; 7.5 mg (7.5 mg x 1) all other days   Weekly warfarin total:   41.25 mg   Plan last modified:   Breana Aguila, A.P.NMio (2020)   Next INR check:   7/10/2020   Target end date:   Indefinite    Indications    CAD (coronary artery disease) [I25.10]  Type 2 diabetes mellitus (HCC) [E11.9]  Smoking [F17.200]  Ischemic cardiomyopathy [I25.5]  AICD (automatic cardioverter/defibrillator) present [Z95.810]  Pulmonary embolism (HCC) [I26.99]  Deep vein thrombosis (HCC) [I82.409]             Anticoagulation Episode Summary     INR check location:       Preferred lab:       Send INR reminders to:       Comments:   [facility]             Review of Systems   HENT: Negative for nosebleeds.   Respiratory: Negative for shortness of breath.  Gastrointestinal: Negative for blood in stool.   Genitourinary: Negative for hematuria.   Psychiatric/Behavioral: The patient is not nervous/anxious.     Physical Exam   Constitutional: Oriented to person, place, and time. Appears well-developed and well-nourished.   Pulmonary/Chest: Effort normal. No respiratory distress.   Skin: Not diaphoretic.  Psychiatric: Normal mood and affect. Behavior is normal.          MAYANK Lezama  [facility]

## 2020-07-10 ENCOUNTER — APPOINTMENT (OUTPATIENT)
Dept: SCHEDULING | Facility: IMAGING CENTER | Age: 60
End: 2020-07-10
Payer: MEDICARE

## 2020-07-10 ENCOUNTER — ANTICOAGULATION MONITORING (OUTPATIENT)
Dept: VASCULAR LAB | Facility: MEDICAL CENTER | Age: 60
End: 2020-07-10
Payer: MEDICARE

## 2020-07-10 DIAGNOSIS — F17.200 SMOKING: ICD-10-CM

## 2020-07-10 DIAGNOSIS — I25.10 CORONARY ARTERY DISEASE INVOLVING NATIVE HEART WITHOUT ANGINA PECTORIS, UNSPECIFIED VESSEL OR LESION TYPE: ICD-10-CM

## 2020-07-10 DIAGNOSIS — Z95.810 AICD (AUTOMATIC CARDIOVERTER/DEFIBRILLATOR) PRESENT: ICD-10-CM

## 2020-07-10 DIAGNOSIS — I25.5 ISCHEMIC CARDIOMYOPATHY: ICD-10-CM

## 2020-07-10 LAB — INR PPP: 2.3 (ref 2–3.5)

## 2020-07-10 PROCEDURE — 99211 OFF/OP EST MAY X REQ PHY/QHP: CPT | Performed by: NURSE PRACTITIONER

## 2020-07-10 NOTE — PROGRESS NOTES
OP Anticoagulation Service Note    Date: 7/10/2020     Plan: Pt is therapeutic . Denies any s/s of bleeding or clotting. Denies any changes in medications or diet. Will continue warfarin dosing as follows. Follow up appointment in 2 week(s).   BP: 104/57, HR 71, 97%     THIS VISIT HAS BEEN CONDUCTED WITH [employer] UT[employer] SECURE AND ENCRYPTED VIDEO-CONFERENCING EQUIPMENT    Anticoagulation Summary  As of 7/10/2020    INR goal:   2.0-3.0   TTR:   12.7 % (7.6 mo)   INR used for dosin.30 (7/10/2020)   Warfarin maintenance plan:   3.75 mg (7.5 mg x 0.5) every Mon, Wed, Fri; 7.5 mg (7.5 mg x 1) all other days   Weekly warfarin total:   41.25 mg   Plan last modified:   AUSTIN Bazan (2020)   Next INR check:   2020   Target end date:   Indefinite    Indications    CAD (coronary artery disease) [I25.10]  Type 2 diabetes mellitus (HCC) [E11.9]  Smoking [F17.200]  Ischemic cardiomyopathy [I25.5]  AICD (automatic cardioverter/defibrillator) present [Z95.810]  Pulmonary embolism (HCC) [I26.99]  Deep vein thrombosis (HCC) [I82.409]             Anticoagulation Episode Summary     INR check location:       Preferred lab:       Send INR reminders to:       Comments:   [facility]           Review of Systems   HENT: Negative for nosebleeds.   Respiratory: Negative for shortness of breath.  Gastrointestinal: Negative for blood in stool.   Genitourinary: Negative for hematuria.   Psychiatric/Behavioral: The patient is not nervous/anxious.     Physical Exam   Constitutional: Oriented to person, place, and time. Appears well-developed and well-nourished.   Pulmonary/Chest: Effort normal. No respiratory distress.   Skin: Not diaphoretic.  Psychiatric: Normal mood and affect. Behavior is normal.        MAYANK Lezama  [facility]

## 2020-07-24 ENCOUNTER — TELEPHONE (OUTPATIENT)
Dept: VASCULAR LAB | Facility: MEDICAL CENTER | Age: 60
End: 2020-07-24

## 2020-07-24 DIAGNOSIS — Z79.01 CHRONIC ANTICOAGULATION: ICD-10-CM

## 2020-07-24 DIAGNOSIS — I26.99 PULMONARY EMBOLISM, UNSPECIFIED CHRONICITY, UNSPECIFIED PULMONARY EMBOLISM TYPE, UNSPECIFIED WHETHER ACUTE COR PULMONALE PRESENT (HCC): ICD-10-CM

## 2020-07-31 ENCOUNTER — APPOINTMENT (OUTPATIENT)
Dept: SCHEDULING | Facility: IMAGING CENTER | Age: 60
End: 2020-07-31
Payer: MEDICARE

## 2020-07-31 ENCOUNTER — ANTICOAGULATION MONITORING (OUTPATIENT)
Dept: VASCULAR LAB | Facility: MEDICAL CENTER | Age: 60
End: 2020-07-31

## 2020-07-31 VITALS — HEART RATE: 84 BPM | OXYGEN SATURATION: 98 % | DIASTOLIC BLOOD PRESSURE: 56 MMHG | SYSTOLIC BLOOD PRESSURE: 96 MMHG

## 2020-07-31 DIAGNOSIS — I25.5 ISCHEMIC CARDIOMYOPATHY: ICD-10-CM

## 2020-07-31 DIAGNOSIS — Z95.810 AICD (AUTOMATIC CARDIOVERTER/DEFIBRILLATOR) PRESENT: ICD-10-CM

## 2020-07-31 DIAGNOSIS — F17.200 SMOKING: ICD-10-CM

## 2020-07-31 LAB — INR PPP: 1.4 (ref 2–3.5)

## 2020-07-31 NOTE — PROGRESS NOTES
OP Anticoagulation Service Note    THIS VISIT CONDUCTED WITH [employer] UTIL[employer]ING SECURE AND ENCRYPTED VIDEOCONFERENCING EQUIPMENT  ROS:      Anticoagulation Summary  As of 2020    INR goal:   2.0-3.0   TTR:   14.5 % (8.3 mo)   INR used for dosin.40! (2020)   Warfarin maintenance plan:   3.75 mg (7.5 mg x 0.5) every Tue, Thu; 7.5 mg (7.5 mg x 1) all other days   Weekly warfarin total:   45 mg   Plan last modified:   Tony Gallo, PharmD (2020)   Next INR check:   2020   Target end date:   Indefinite    Indications    CAD (coronary artery disease) [I25.10]  Type 2 diabetes mellitus (HCC) [E11.9]  Smoking [F17.200]  Ischemic cardiomyopathy [I25.5]  AICD (automatic cardioverter/defibrillator) present [Z95.810]  Pulmonary embolism (HCC) [I26.99]  Deep vein thrombosis (HCC) [I82.409]             Anticoagulation Episode Summary     INR check location:       Preferred lab:       Send INR reminders to:       Comments:   [facility] tele[facility]        Anticoagulation Patient Findings      HPI:  Nisa Ross, on anticoagulation therapy with warfarin for PE  Changes to current medical/health status since last appt: none  Denies signs/symptoms of bleeding and/or thrombosis since the last appt.    Denies any interval changes to diet  Denies any interval changes to medications since last appt.   Denies any complications or cost restrictions with current therapy.   Pt was taking more warfarin than our record reflected.  Spent a good amount of time confirming how much warfarin he has been taking.     A/P   INR  SUB-therapeutic.   Pt reports a recently missed dose of warfarin.  Pt to bolus 11.25 mg today then continue his current regimen (of how he reported he is taking warfarin).  No bridging in the setting of labile INR.     Next INR in 1 week(s).    Review all of your home medications and newly ordered medications with your doctor and / or pharmacist. Follow medication instructions as  directed by your doctor and / or pharmacist. Please keep your medication list with you and share with your physician. Update the information when medications are discontinued, doses are changed, or new medications (including over-the-counter products) are added; and carry medication information at all times in the event of emergency situations.      For questions, please contact Outpatient Anticoagulation Service [phone].     Tony Gallo, PharmD

## 2020-08-07 ENCOUNTER — ANTICOAGULATION MONITORING (OUTPATIENT)
Dept: VASCULAR LAB | Facility: MEDICAL CENTER | Age: 60
End: 2020-08-07
Payer: MEDICARE

## 2020-08-07 ENCOUNTER — APPOINTMENT (OUTPATIENT)
Dept: SCHEDULING | Facility: IMAGING CENTER | Age: 60
End: 2020-08-07
Payer: MEDICARE

## 2020-08-07 DIAGNOSIS — I25.5 ISCHEMIC CARDIOMYOPATHY: ICD-10-CM

## 2020-08-07 DIAGNOSIS — I26.99 PULMONARY EMBOLISM, OTHER, UNSPECIFIED CHRONICITY, UNSPECIFIED WHETHER ACUTE COR PULMONALE PRESENT (HCC): ICD-10-CM

## 2020-08-07 DIAGNOSIS — F17.200 SMOKING: ICD-10-CM

## 2020-08-07 DIAGNOSIS — Z95.810 AICD (AUTOMATIC CARDIOVERTER/DEFIBRILLATOR) PRESENT: ICD-10-CM

## 2020-08-07 LAB — INR PPP: 4.9 (ref 2–3.5)

## 2020-08-07 PROCEDURE — 99212 OFFICE O/P EST SF 10 MIN: CPT | Performed by: NURSE PRACTITIONER

## 2020-08-07 NOTE — PROGRESS NOTES
OP Anticoagulation Service Note    Date: 2020     Plan: Pt is now supratherapeutic . Denies any s/s of bleeding or clotting. Denies any changes in medications or diet.  He states it has been almost 5 wks since his last ETOH.  Will decrease warfarin dosing as follows. Follow up appointment in 1 week(s).   BP: 130/73, HR 69, 99%    FRI: NO COUMADIN  Have an extra serving of green veggies tonight or tomorrow!     THIS VISIT HAS BEEN CONDUCTED WITH [employer] UTIL[employer]ING SECURE AND ENCRYPTED VIDEO-CONFERENCING EQUIPMENT    Anticoagulation Summary  As of 2020    INR goal:  2.0-3.0   TTR:  14.9 % (8.5 mo)   INR used for dosin.90 (2020)   Warfarin maintenance plan:  3.75 mg (7.5 mg x 0.5) every Mon, Wed, Fri; 7.5 mg (7.5 mg x 1) all other days   Weekly warfarin total:  41.25 mg   Plan last modified:  Breana Aguila, A.P.NMio (2020)   Next INR check:  2020   Target end date:  Indefinite    Indications    CAD (coronary artery disease) [I25.10]  Type 2 diabetes mellitus (HCC) [E11.9]  Smoking [F17.200]  Ischemic cardiomyopathy [I25.5]  AICD (automatic cardioverter/defibrillator) present [Z95.810]  Pulmonary embolism (HCC) [I26.99]  Deep vein thrombosis (HCC) [I82.409]             Anticoagulation Episode Summary     INR check location:      Preferred lab:      Send INR reminders to:      Comments:  [facility]           Review of Systems   HENT: Negative for nosebleeds.   Respiratory: Negative for shortness of breath.  Gastrointestinal: Negative for blood in stool.   Genitourinary: Negative for hematuria.   Psychiatric/Behavioral: The patient is not nervous/anxious.     Physical Exam   Constitutional: Oriented to person, place, and time. Appears well-developed and well-nourished.   Pulmonary/Chest: Effort normal. No respiratory distress.   Skin: Not diaphoretic.  Psychiatric: Normal mood and affect. Behavior is normal.        MAYANK Lezama  [facility]

## 2020-08-14 ENCOUNTER — ANTICOAGULATION MONITORING (OUTPATIENT)
Dept: VASCULAR LAB | Facility: MEDICAL CENTER | Age: 60
End: 2020-08-14

## 2020-08-14 ENCOUNTER — APPOINTMENT (OUTPATIENT)
Dept: SCHEDULING | Facility: IMAGING CENTER | Age: 60
End: 2020-08-14
Payer: MEDICARE

## 2020-08-14 VITALS — DIASTOLIC BLOOD PRESSURE: 56 MMHG | SYSTOLIC BLOOD PRESSURE: 101 MMHG | HEART RATE: 76 BPM | OXYGEN SATURATION: 97 %

## 2020-08-14 DIAGNOSIS — F17.200 SMOKING: ICD-10-CM

## 2020-08-14 DIAGNOSIS — I25.5 ISCHEMIC CARDIOMYOPATHY: ICD-10-CM

## 2020-08-14 DIAGNOSIS — Z95.810 AICD (AUTOMATIC CARDIOVERTER/DEFIBRILLATOR) PRESENT: ICD-10-CM

## 2020-08-14 LAB — INR PPP: 2.7 (ref 2–3.5)

## 2020-08-14 NOTE — PROGRESS NOTES
OP Anticoagulation Service Note    THIS VISIT CONDUCTED WITH [employer] UTIL[employer]ING SECURE AND ENCRYPTED VIDEOCONFERENCING EQUIPMENT  ROS:      Anticoagulation Summary  As of 2020    INR goal:  2.0-3.0   TTR:  14.8 % (8.8 mo)   INR used for dosin.70 (2020)   Warfarin maintenance plan:  3.75 mg (7.5 mg x 0.5) every Mon, Wed, Fri; 7.5 mg (7.5 mg x 1) all other days   Weekly warfarin total:  41.25 mg   Plan last modified:  AUSTIN Bazan (2020)   Next INR check:  2020   Target end date:  Indefinite    Indications    CAD (coronary artery disease) [I25.10]  Type 2 diabetes mellitus (HCC) [E11.9]  Smoking [F17.200]  Ischemic cardiomyopathy [I25.5]  AICD (automatic cardioverter/defibrillator) present [Z95.810]  Pulmonary embolism (HCC) [I26.99]  Deep vein thrombosis (HCC) [I82.409]             Anticoagulation Episode Summary     INR check location:      Preferred lab:      Send INR reminders to:      Comments:  [facility] telemed        Anticoagulation Patient Findings  HPI:  Potosikimberly Luceroy Vandana seen today, on anticoagulation therapy with warfarin for PE.  Changes to current medical/health status since last appt: none  Denies signs/symptoms of bleeding and/or thrombosis since the last appt.    Confirms he has been eating more greens as he was instructed at our last visit.   Denies any interval changes to medications since last appt.   Denies any complications or cost restrictions with current therapy.   BP recorded in vitals.  Confirmed dosing regimen.     A/P   INR  therapeutic.   Pt is to continue with current warfarin dosing regimen.     Follow up appointment in 1 week(s).    Tony Gallo, PharmD     Review all of your home medications and newly ordered medications with your doctor and / or pharmacist. Follow medication instructions as directed by your doctor and / or pharmacist. Please keep your medication list with you and share with your physician. Update the  information when medications are discontinued, doses are changed, or new medications (including over-the-counter products) are added; and carry medication information at all times in the event of emergency situations.      For questions, please contact Outpatient Anticoagulation Service [phone].     Tony Gallo, ConstantinoD

## 2020-08-21 ENCOUNTER — ANTICOAGULATION MONITORING (OUTPATIENT)
Dept: VASCULAR LAB | Facility: MEDICAL CENTER | Age: 60
End: 2020-08-21
Payer: MEDICARE

## 2020-08-21 ENCOUNTER — APPOINTMENT (OUTPATIENT)
Dept: SCHEDULING | Facility: IMAGING CENTER | Age: 60
End: 2020-08-21
Payer: MEDICARE

## 2020-08-21 DIAGNOSIS — F17.200 SMOKING: ICD-10-CM

## 2020-08-21 DIAGNOSIS — I25.5 ISCHEMIC CARDIOMYOPATHY: ICD-10-CM

## 2020-08-21 DIAGNOSIS — Z95.810 AICD (AUTOMATIC CARDIOVERTER/DEFIBRILLATOR) PRESENT: ICD-10-CM

## 2020-08-21 LAB — INR PPP: 3.4 (ref 2–3.5)

## 2020-08-21 PROCEDURE — 99212 OFFICE O/P EST SF 10 MIN: CPT | Performed by: NURSE PRACTITIONER

## 2020-08-21 NOTE — PROGRESS NOTES
OP Anticoagulation Service Note    Date: 8/21/2020       Plan: Pt is again supratherapeutic . Denies any s/s of bleeding or clotting. Denies any changes in medications or diet. Will decrease warfarin dosing as follows. Follow up appointment in 2 week(s).   BP: 99/60, HR 81, 99%     THIS VISIT HAS BEEN CONDUCTED WITH [employer] UTIL[employer]ING SECURE AND ENCRYPTED VIDEO-CONFERENCING EQUIPMENT    Anticoagulation Summary  As of 8/21/2020    INR goal:  2.0-3.0   TTR:  15.6 % (9 mo)   INR used for dosing:  3.40 (8/21/2020)   Warfarin maintenance plan:  7.5 mg (7.5 mg x 1) every Mon, Wed, Fri; 3.75 mg (7.5 mg x 0.5) all other days   Weekly warfarin total:  37.5 mg   Plan last modified:  AUSTIN Bazan (8/21/2020)   Next INR check:  9/4/2020   Target end date:  Indefinite    Indications    CAD (coronary artery disease) [I25.10]  Type 2 diabetes mellitus (HCC) [E11.9]  Smoking [F17.200]  Ischemic cardiomyopathy [I25.5]  AICD (automatic cardioverter/defibrillator) present [Z95.810]  Pulmonary embolism (HCC) [I26.99]  Deep vein thrombosis (HCC) [I82.409]             Anticoagulation Episode Summary     INR check location:      Preferred lab:      Send INR reminders to:      Comments:  [facility]             Review of Systems   HENT: Negative for nosebleeds.   Respiratory: Negative for shortness of breath.  Gastrointestinal: Negative for blood in stool.   Genitourinary: Negative for hematuria.   Psychiatric/Behavioral: The patient is not nervous/anxious.     Physical Exam   Constitutional: Oriented to person, place, and time. Appears well-developed and well-nourished.   Pulmonary/Chest: Effort normal. No respiratory distress.   Skin: Not diaphoretic.  Psychiatric: Normal mood and affect. Behavior is normal.        MAYANK Lezama  [facility]

## 2020-09-04 ENCOUNTER — ANTICOAGULATION MONITORING (OUTPATIENT)
Dept: VASCULAR LAB | Facility: MEDICAL CENTER | Age: 60
End: 2020-09-04

## 2020-09-04 ENCOUNTER — APPOINTMENT (OUTPATIENT)
Dept: SCHEDULING | Facility: IMAGING CENTER | Age: 60
End: 2020-09-04
Payer: MEDICARE

## 2020-09-04 VITALS — HEART RATE: 70 BPM | OXYGEN SATURATION: 98 % | SYSTOLIC BLOOD PRESSURE: 115 MMHG | DIASTOLIC BLOOD PRESSURE: 63 MMHG

## 2020-09-04 DIAGNOSIS — F17.200 SMOKING: ICD-10-CM

## 2020-09-04 DIAGNOSIS — Z95.810 AICD (AUTOMATIC CARDIOVERTER/DEFIBRILLATOR) PRESENT: ICD-10-CM

## 2020-09-04 DIAGNOSIS — I25.5 ISCHEMIC CARDIOMYOPATHY: ICD-10-CM

## 2020-09-04 LAB — INR PPP: 2.5 (ref 2–3.5)

## 2020-09-04 NOTE — PROGRESS NOTES
OP Anticoagulation Service Note    THIS VISIT CONDUCTED WITH [employer] UTIL[employer]ING SECURE AND ENCRYPTED VIDEOCONFERENCING EQUIPMENT  ROS:      Anticoagulation Summary  As of 2020    INR goal:  2.0-3.0   TTR:  17.5 % (9.5 mo)   INR used for dosin.50 (2020)   Warfarin maintenance plan:  3.75 mg (7.5 mg x 0.5) every Tue, Thu; 7.5 mg (7.5 mg x 1) all other days   Weekly warfarin total:  45 mg   Plan last modified:  Tony Gallo, PharmD (2020)   Next INR check:  2020   Target end date:  Indefinite    Indications    CAD (coronary artery disease) [I25.10]  Type 2 diabetes mellitus (HCC) [E11.9]  Smoking [F17.200]  Ischemic cardiomyopathy [I25.5]  AICD (automatic cardioverter/defibrillator) present [Z95.810]  Pulmonary embolism (HCC) [I26.99]  Deep vein thrombosis (HCC) [I82.409]             Anticoagulation Episode Summary     INR check location:      Preferred lab:      Send INR reminders to:      Comments:  [facility] telemed        Anticoagulation Patient Findings    HPI:  Nisa Ross, on anticoagulation therapy with warfarin for PE.   Changes to current medical/health status since last appt: none  Denies signs/symptoms of bleeding and/or thrombosis since the last appt.    Pt states he ran out of green vegetables so he started drinking V8 juice.   Denies any interval changes to medications since last appt.   Denies any complications or cost restrictions with current therapy.     A/P   INR  therapeutic.   Pt took more warfarin than requested because he forgot the regimen he was supposed to take.  This worked out well with the V8 juice he has been drinking.  Pt would prefer to continue with drinking V8 juice EOD like he has been doing the past 2 weeks.  Given this change in diet, will have pt continue warfarin at the dose he has been taking it prior to today.     Next INR in 2 week(s).    Review all of your home medications and newly ordered medications with your doctor and /  or pharmacist. Follow medication instructions as directed by your doctor and / or pharmacist. Please keep your medication list with you and share with your physician. Update the information when medications are discontinued, doses are changed, or new medications (including over-the-counter products) are added; and carry medication information at all times in the event of emergency situations.      For questions, please contact Outpatient Anticoagulation Service [phone].     Tony Gallo, ConstantinoD

## 2020-09-18 ENCOUNTER — ANTICOAGULATION MONITORING (OUTPATIENT)
Dept: VASCULAR LAB | Facility: MEDICAL CENTER | Age: 60
End: 2020-09-18
Payer: MEDICARE

## 2020-09-18 ENCOUNTER — APPOINTMENT (OUTPATIENT)
Dept: SCHEDULING | Facility: IMAGING CENTER | Age: 60
End: 2020-09-18
Payer: MEDICARE

## 2020-09-18 DIAGNOSIS — I25.5 ISCHEMIC CARDIOMYOPATHY: ICD-10-CM

## 2020-09-18 DIAGNOSIS — F17.200 SMOKING: ICD-10-CM

## 2020-09-18 DIAGNOSIS — I26.99 OTHER PULMONARY EMBOLISM WITHOUT ACUTE COR PULMONALE, UNSPECIFIED CHRONICITY (HCC): ICD-10-CM

## 2020-09-18 DIAGNOSIS — Z95.810 AICD (AUTOMATIC CARDIOVERTER/DEFIBRILLATOR) PRESENT: ICD-10-CM

## 2020-09-18 LAB — INR PPP: 1.3 (ref 2–3.5)

## 2020-09-18 PROCEDURE — 99212 OFFICE O/P EST SF 10 MIN: CPT | Performed by: NURSE PRACTITIONER

## 2020-09-18 NOTE — PROGRESS NOTES
OP Anticoagulation Service Note    Date: 2020       Plan: Pt is subtherapeutic. He denies any missed doses.  Denies any s/s of bleeding or clotting. Denies any changes in medications.  He does state he has increased his green veggies and V8 juice; plans to keep this consistent.  Will increase warfarin dosing as follows. Follow up appointment in 1 week(s).   BP: 96/52, HR 64, 98%    FRI: 11.25mg for a one time only     THIS VISIT HAS BEEN CONDUCTED WITH [employer] UTILIZING SECURE AND ENCRYPTED VIDEO-CONFERENCING EQUIPMENT    Anticoagulation Summary  As of 2020    INR goal:  2.0-3.0   TTR:  18.7 % (9.9 mo)   INR used for dosin.30 (2020)   Warfarin maintenance plan:  3.75 mg (7.5 mg x 0.5) every Thu; 7.5 mg (7.5 mg x 1) all other days   Weekly warfarin total:  48.75 mg   Plan last modified:  Breana Aguila, A.P.NMio (2020)   Next INR check:  2020   Target end date:  Indefinite    Indications    CAD (coronary artery disease) [I25.10]  Type 2 diabetes mellitus (HCC) [E11.9]  Smoking [F17.200]  Ischemic cardiomyopathy [I25.5]  AICD (automatic cardioverter/defibrillator) present [Z95.810]  Pulmonary embolism (HCC) [I26.99]  Deep vein thrombosis (HCC) [I82.409]             Anticoagulation Episode Summary     INR check location:      Preferred lab:      Send INR reminders to:      Comments:  [facility]             Review of Systems   HENT: Negative for nosebleeds.   Respiratory: Negative for shortness of breath.  Gastrointestinal: Negative for blood in stool.   Genitourinary: Negative for hematuria.   Psychiatric/Behavioral: The patient is not nervous/anxious.     Physical Exam   Constitutional: Oriented to person, place, and time. Appears well-developed and well-nourished.   Pulmonary/Chest: Effort normal. No respiratory distress.   Skin: Not diaphoretic.  Psychiatric: Normal mood and affect. Behavior is normal.        MAYANK Lezama  [facility]

## 2020-09-25 ENCOUNTER — ANTICOAGULATION MONITORING (OUTPATIENT)
Dept: VASCULAR LAB | Facility: MEDICAL CENTER | Age: 60
End: 2020-09-25
Payer: MEDICARE

## 2020-09-25 ENCOUNTER — APPOINTMENT (OUTPATIENT)
Dept: SCHEDULING | Facility: IMAGING CENTER | Age: 60
End: 2020-09-25
Payer: MEDICARE

## 2020-09-25 DIAGNOSIS — F17.200 SMOKING: ICD-10-CM

## 2020-09-25 DIAGNOSIS — Z95.810 AICD (AUTOMATIC CARDIOVERTER/DEFIBRILLATOR) PRESENT: ICD-10-CM

## 2020-09-25 DIAGNOSIS — I25.5 ISCHEMIC CARDIOMYOPATHY: ICD-10-CM

## 2020-09-25 DIAGNOSIS — I26.99 OTHER PULMONARY EMBOLISM WITHOUT ACUTE COR PULMONALE, UNSPECIFIED CHRONICITY (HCC): ICD-10-CM

## 2020-09-25 LAB — INR PPP: 2.8 (ref 2–3.5)

## 2020-09-25 PROCEDURE — 99211 OFF/OP EST MAY X REQ PHY/QHP: CPT | Performed by: NURSE PRACTITIONER

## 2020-09-25 RX ORDER — INSULIN GLARGINE 100 [IU]/ML
INJECTION, SOLUTION SUBCUTANEOUS EVERY EVENING
COMMUNITY

## 2020-09-25 NOTE — PROGRESS NOTES
OP Anticoagulation Service Note    Date: 2020       Plan:  Pt is therapeutic.  Denies any s/s of bleeding or clotting. He was recently started on metformin and his Lantus increased to 30u/noc.  Meds updated.  Denies any changes in diet. Will continue warfarin dosing as follows.  Follow up appointment in 1 week(s).   BP: 139/71, HR 77, 97%     THIS VISIT HAS BEEN CONDUCTED WITH [employer] UTILIZING SECURE AND ENCRYPTED VIDEO-CONFERENCING EQUIPMENT    Anticoagulation Summary  As of 2020    INR goal:  2.0-3.0   TTR:  19.5 % (10.2 mo)   INR used for dosin.80 (2020)   Warfarin maintenance plan:  3.75 mg (7.5 mg x 0.5) every Thu; 7.5 mg (7.5 mg x 1) all other days   Weekly warfarin total:  48.75 mg   Plan last modified:  AUSTIN Bazan (2020)   Next INR check:  10/2/2020   Target end date:  Indefinite    Indications    CAD (coronary artery disease) [I25.10]  Type 2 diabetes mellitus (HCC) [E11.9]  Smoking [F17.200]  Ischemic cardiomyopathy [I25.5]  AICD (automatic cardioverter/defibrillator) present [Z95.810]  Pulmonary embolism (HCC) [I26.99]  Deep vein thrombosis (HCC) [I82.409]             Anticoagulation Episode Summary     INR check location:      Preferred lab:      Send INR reminders to:      Comments:  [facility]             Review of Systems   HENT: Negative for nosebleeds.   Respiratory: Negative for shortness of breath.  Gastrointestinal: Negative for blood in stool.   Genitourinary: Negative for hematuria.   Psychiatric/Behavioral: The patient is not nervous/anxious.     Physical Exam   Constitutional: Oriented to person, place, and time. Appears well-developed and well-nourished.   Pulmonary/Chest: Effort normal. No respiratory distress.   Skin: Not diaphoretic.  Psychiatric: Normal mood and affect. Behavior is normal.        MAYANK Lezama  [facility]

## 2020-10-01 ENCOUNTER — TELEPHONE (OUTPATIENT)
Dept: CARDIOLOGY | Facility: MEDICAL CENTER | Age: 60
End: 2020-10-01

## 2020-10-01 NOTE — TELEPHONE ENCOUNTER
Device [facility]    Patient got a letter saying they are due to have a PMC but they live in [place]. Pt was told they were going to get a remote device, they have not received it. Please call the Pt back at [phone].    Thank you,  Juliet CUBA

## 2020-10-02 ENCOUNTER — APPOINTMENT (OUTPATIENT)
Dept: SCHEDULING | Facility: IMAGING CENTER | Age: 60
End: 2020-10-02
Payer: MEDICARE

## 2020-10-02 ENCOUNTER — ANTICOAGULATION MONITORING (OUTPATIENT)
Dept: VASCULAR LAB | Facility: MEDICAL CENTER | Age: 60
End: 2020-10-02
Payer: MEDICARE

## 2020-10-02 DIAGNOSIS — Z95.810 AICD (AUTOMATIC CARDIOVERTER/DEFIBRILLATOR) PRESENT: ICD-10-CM

## 2020-10-02 DIAGNOSIS — I26.99 OTHER PULMONARY EMBOLISM WITHOUT ACUTE COR PULMONALE, UNSPECIFIED CHRONICITY (HCC): ICD-10-CM

## 2020-10-02 DIAGNOSIS — F17.200 SMOKING: ICD-10-CM

## 2020-10-02 DIAGNOSIS — I25.5 ISCHEMIC CARDIOMYOPATHY: ICD-10-CM

## 2020-10-02 LAB — INR PPP: 2.2 (ref 2–3.5)

## 2020-10-02 PROCEDURE — 99211 OFF/OP EST MAY X REQ PHY/QHP: CPT | Performed by: NURSE PRACTITIONER

## 2020-10-02 NOTE — PROGRESS NOTES
OP Anticoagulation Service Note    Date: 10/2/2020       Plan: Pt is therapeutic . Denies any s/s of bleeding or clotting. Denies any changes in medications or diet. Will continue warfarin dosing as follows. Follow up appointment in 2 week(s).   BP: 112/65, HR 77, 98%     THIS VISIT HAS BEEN CONDUCTED WITH [employer] UTILIZING SECURE AND ENCRYPTED VIDEO-CONFERENCING EQUIPMENT    Anticoagulation Summary  As of 10/2/2020    INR goal:  2.0-3.0   TTR:  21.3 % (10.4 mo)   INR used for dosin.20 (10/2/2020)   Warfarin maintenance plan:  3.75 mg (7.5 mg x 0.5) every Thu; 7.5 mg (7.5 mg x 1) all other days   Weekly warfarin total:  48.75 mg   Plan last modified:  AUSTIN Bazan (2020)   Next INR check:  10/16/2020   Target end date:  Indefinite    Indications    CAD (coronary artery disease) [I25.10]  Type 2 diabetes mellitus (HCC) [E11.9]  Smoking [F17.200]  Ischemic cardiomyopathy [I25.5]  AICD (automatic cardioverter/defibrillator) present [Z95.810]  Pulmonary embolism (HCC) [I26.99]  Deep vein thrombosis (HCC) [I82.409]             Anticoagulation Episode Summary     INR check location:      Preferred lab:      Send INR reminders to:      Comments:  [facility]             Review of Systems   HENT: Negative for nosebleeds.   Respiratory: Negative for shortness of breath.  Gastrointestinal: Negative for blood in stool.   Genitourinary: Negative for hematuria.   Psychiatric/Behavioral: The patient is not nervous/anxious.     Physical Exam   Constitutional: Oriented to person, place, and time. Appears well-developed and well-nourished.   Pulmonary/Chest: Effort normal. No respiratory distress.   Skin: Not diaphoretic.  Psychiatric: Normal mood and affect. Behavior is normal.        MAYANK Lezama  [facility]

## 2020-10-05 NOTE — TELEPHONE ENCOUNTER
Spoke with patient--monitor and cell adapter to be mailed to patient.  Patient will contact office once received--my direct line given.

## 2020-10-16 ENCOUNTER — ANTICOAGULATION MONITORING (OUTPATIENT)
Dept: VASCULAR LAB | Facility: MEDICAL CENTER | Age: 60
End: 2020-10-16

## 2020-10-16 ENCOUNTER — APPOINTMENT (OUTPATIENT)
Dept: SCHEDULING | Facility: IMAGING CENTER | Age: 60
End: 2020-10-16
Payer: MEDICARE

## 2020-10-16 VITALS — OXYGEN SATURATION: 97 % | HEART RATE: 88 BPM | SYSTOLIC BLOOD PRESSURE: 106 MMHG | DIASTOLIC BLOOD PRESSURE: 63 MMHG

## 2020-10-16 DIAGNOSIS — F17.200 SMOKING: ICD-10-CM

## 2020-10-16 DIAGNOSIS — Z95.810 AICD (AUTOMATIC CARDIOVERTER/DEFIBRILLATOR) PRESENT: ICD-10-CM

## 2020-10-16 DIAGNOSIS — I25.5 ISCHEMIC CARDIOMYOPATHY: ICD-10-CM

## 2020-10-16 LAB — INR PPP: 1.2 (ref 2–3.5)

## 2020-10-23 ENCOUNTER — TELEMEDICINE (OUTPATIENT)
Dept: CARDIOLOGY | Facility: MEDICAL CENTER | Age: 60
End: 2020-10-23
Payer: MEDICARE

## 2020-10-23 ENCOUNTER — TELEPHONE (OUTPATIENT)
Dept: CARDIOLOGY | Facility: MEDICAL CENTER | Age: 60
End: 2020-10-23

## 2020-10-23 VITALS — BODY MASS INDEX: 23.62 KG/M2 | WEIGHT: 165 LBS | HEIGHT: 70 IN

## 2020-10-23 RX ORDER — INSULIN GLARGINE 100 [IU]/ML
INJECTION, SOLUTION SUBCUTANEOUS
COMMUNITY
Start: 2020-09-24

## 2020-10-23 RX ORDER — OMEPRAZOLE 20 MG/1
20 CAPSULE, DELAYED RELEASE ORAL DAILY
COMMUNITY
Start: 2020-08-19

## 2020-10-23 ASSESSMENT — FIBROSIS 4 INDEX: FIB4 SCORE: 1.16

## 2020-10-23 NOTE — TELEPHONE ENCOUNTER
Set pt. up for virtual visit, when directing pt. to connect to video for visit pt. stated he does not currently have Internet. Pt. does not have a home bp cuff at this time.  Discussed with Dr. Betancourt, determined pt. Should r/s for a Telemed visit at [facility], so that we can obtain vitals and conduct video visit.   Pt. Denies any cardiac symptoms, encouraged to call VR's nurse if any develop. Transferred to Telemed scheduling, phone number given as well.

## 2020-11-06 ENCOUNTER — APPOINTMENT (OUTPATIENT)
Dept: SCHEDULING | Facility: IMAGING CENTER | Age: 60
End: 2020-11-06
Payer: MEDICARE

## 2020-11-06 ENCOUNTER — ANTICOAGULATION MONITORING (OUTPATIENT)
Dept: VASCULAR LAB | Facility: MEDICAL CENTER | Age: 60
End: 2020-11-06
Payer: MEDICARE

## 2020-11-06 DIAGNOSIS — I26.99 OTHER PULMONARY EMBOLISM WITHOUT ACUTE COR PULMONALE, UNSPECIFIED CHRONICITY (HCC): ICD-10-CM

## 2020-11-06 DIAGNOSIS — F17.200 SMOKING: ICD-10-CM

## 2020-11-06 DIAGNOSIS — I25.5 ISCHEMIC CARDIOMYOPATHY: ICD-10-CM

## 2020-11-06 DIAGNOSIS — Z95.810 AICD (AUTOMATIC CARDIOVERTER/DEFIBRILLATOR) PRESENT: ICD-10-CM

## 2020-11-06 LAB — INR PPP: 1.4 (ref 2–3.5)

## 2020-11-06 PROCEDURE — 99212 OFFICE O/P EST SF 10 MIN: CPT | Performed by: NURSE PRACTITIONER

## 2020-11-06 NOTE — PROGRESS NOTES
OP Anticoagulation Service Note    Date: 2020       Plan: Pt is again subtherapeutic.  Pt declines bridging; would not think it to be a good idea d/t compliance, health literacy,and capability of obtaining meds.  Denies any s/s of bleeding or clotting. Denies any changes in medications or diet. Will increase warfarin dosing as follows. Follow up appointment in 1 week(s).   BP: 121/66, HR 79, 96%     THIS VISIT HAS BEEN CONDUCTED WITH [employer] UTILIZING SECURE AND ENCRYPTED VIDEO-CONFERENCING EQUIPMENT     Anticoagulation Summary  As of 2020    INR goal:  2.0-3.0   TTR:  19.9 % (11.6 mo)   INR used for dosin.40 (2020)   Warfarin maintenance plan:  11.25 mg (7.5 mg x 1.5) every Fri; 7.5 mg (7.5 mg x 1) all other days   Weekly warfarin total:  56.25 mg   Plan last modified:  Breana Aguila, A.P.NMio (2020)   Next INR check:  2020   Target end date:  Indefinite    Indications    CAD (coronary artery disease) [I25.10]  Type 2 diabetes mellitus (HCC) [E11.9]  Smoking [F17.200]  Ischemic cardiomyopathy [I25.5]  AICD (automatic cardioverter/defibrillator) present [Z95.810]  Pulmonary embolism (HCC) [I26.99]  Deep vein thrombosis (HCC) [I82.409]             Anticoagulation Episode Summary     INR check location:      Preferred lab:      Send INR reminders to:      Comments:  Danika telemed            Review of Systems   HENT: Negative for nosebleeds.   Respiratory: Negative for shortness of breath.  Gastrointestinal: Negative for blood in stool.   Genitourinary: Negative for hematuria.   Psychiatric/Behavioral: The patient is not nervous/anxious.     Physical Exam   Constitutional: Oriented to person, place, and time. Appears well-developed and well-nourished.   Pulmonary/Chest: Effort normal. No respiratory distress.   Skin: Not diaphoretic.  Psychiatric: Normal mood and affect. Behavior is normal.        MAYANK Lezama  [place] [facility]

## 2020-11-13 ENCOUNTER — ANTICOAGULATION MONITORING (OUTPATIENT)
Dept: VASCULAR LAB | Facility: MEDICAL CENTER | Age: 60
End: 2020-11-13
Payer: MEDICARE

## 2020-11-13 ENCOUNTER — APPOINTMENT (OUTPATIENT)
Dept: SCHEDULING | Facility: IMAGING CENTER | Age: 60
End: 2020-11-13
Payer: MEDICARE

## 2020-11-13 DIAGNOSIS — Z95.810 AICD (AUTOMATIC CARDIOVERTER/DEFIBRILLATOR) PRESENT: ICD-10-CM

## 2020-11-13 DIAGNOSIS — I26.99 OTHER PULMONARY EMBOLISM WITHOUT ACUTE COR PULMONALE, UNSPECIFIED CHRONICITY (HCC): ICD-10-CM

## 2020-11-13 DIAGNOSIS — F17.200 SMOKING: ICD-10-CM

## 2020-11-13 DIAGNOSIS — I25.5 ISCHEMIC CARDIOMYOPATHY: ICD-10-CM

## 2020-11-13 LAB — INR PPP: 1.6 (ref 2–3.5)

## 2020-11-13 PROCEDURE — 99212 OFFICE O/P EST SF 10 MIN: CPT | Performed by: NURSE PRACTITIONER

## 2020-11-13 NOTE — PROGRESS NOTES
OP Anticoagulation Service Note    Date: 2020       Plan: Pt is again subtherapeutic . Denies any s/s of bleeding or clotting. Denies any changes in medications.  He does admit to drinking V8 daily, and plans to continue.  We did have a discussion on drug/food interactions.  Diet is labile.  Will continue to increase warfarin dosing as follows. Follow up appointment in 1 week(s).   BP: 125/48, HR 79, 98%     THIS VISIT HAS BEEN CONDUCTED WITH [employer] UT[employer] SECURE AND ENCRYPTED VIDEO-CONFERENCING EQUIPMENT    Anticoagulation Summary  As of 2020    INR goal:  2.0-3.0   TTR:  19.5 % (11.8 mo)   INR used for dosin.60 (2020)   Warfarin maintenance plan:  11.25 mg (7.5 mg x 1.5) every Mon, Wed, Fri; 7.5 mg (7.5 mg x 1) all other days   Weekly warfarin total:  63.75 mg   Plan last modified:  Breana Aguila, AMioP.NMio (2020)   Next INR check:  2020   Target end date:  Indefinite    Indications    CAD (coronary artery disease) [I25.10]  Type 2 diabetes mellitus (HCC) [E11.9]  Smoking [F17.200]  Ischemic cardiomyopathy [I25.5]  AICD (automatic cardioverter/defibrillator) present [Z95.810]  Pulmonary embolism (HCC) [I26.99]  Deep vein thrombosis (HCC) [I82.409]             Anticoagulation Episode Summary     INR check location:      Preferred lab:      Send INR reminders to:      Comments:  Danika telemed             Review of Systems   HENT: Negative for nosebleeds.   Respiratory: Negative for shortness of breath.  Gastrointestinal: Negative for blood in stool.   Genitourinary: Negative for hematuria.   Psychiatric/Behavioral: The patient is not nervous/anxious.     Physical Exam   Constitutional: Oriented to person, place, and time. Appears well-developed and well-nourished.   Pulmonary/Chest: Effort normal. No respiratory distress.   Skin: Not diaphoretic.  Psychiatric: Normal mood and affect. Behavior is normal.         MAYANK Lezama  [place] [facility]

## 2020-11-20 ENCOUNTER — ANTICOAGULATION MONITORING (OUTPATIENT)
Dept: VASCULAR LAB | Facility: MEDICAL CENTER | Age: 60
End: 2020-11-20
Payer: MEDICARE

## 2020-11-20 ENCOUNTER — APPOINTMENT (OUTPATIENT)
Dept: SCHEDULING | Facility: IMAGING CENTER | Age: 60
End: 2020-11-20
Payer: MEDICARE

## 2020-11-20 DIAGNOSIS — Z95.810 AICD (AUTOMATIC CARDIOVERTER/DEFIBRILLATOR) PRESENT: ICD-10-CM

## 2020-11-20 DIAGNOSIS — F17.200 SMOKING: ICD-10-CM

## 2020-11-20 DIAGNOSIS — I26.99 OTHER PULMONARY EMBOLISM WITHOUT ACUTE COR PULMONALE, UNSPECIFIED CHRONICITY (HCC): ICD-10-CM

## 2020-11-20 DIAGNOSIS — I25.5 ISCHEMIC CARDIOMYOPATHY: ICD-10-CM

## 2020-11-20 LAB — INR PPP: 2.5 (ref 2–3.5)

## 2020-11-20 PROCEDURE — 99211 OFF/OP EST MAY X REQ PHY/QHP: CPT | Performed by: NURSE PRACTITIONER

## 2020-11-20 NOTE — PROGRESS NOTES
OP Anticoagulation Service Note    Date: 11/20/20       Plan: Pt is therapeutic . Denies any s/s of bleeding or clotting. Denies any changes in medications or diet. Will continue warfarin dosing as follows. Follow up appointment in 2 week(s) d/t the holiday and pt's inability to get to the lab.   BP: 126/66, HR 74, 98%     THIS VISIT HAS BEEN CONDUCTED WITH [employer] UT[employer] SECURE AND ENCRYPTED VIDEO-CONFERENCING EQUIPMENT     Anticoagulation Summary  As of 11/20/2020    INR goal:  2.0-3.0   TTR:  19.5 % (11.8 mo)   INR used for dosing:     Warfarin maintenance plan:  11.25 mg (7.5 mg x 1.5) every Mon, Wed, Fri; 7.5 mg (7.5 mg x 1) all other days   Weekly warfarin total:  63.75 mg   Plan last modified:  DENISE BazanP.BOSSMAN (11/13/2020)   Next INR check:     Target end date:  Indefinite    Indications    CAD (coronary artery disease) [I25.10]  Type 2 diabetes mellitus (HCC) [E11.9]  Smoking [F17.200]  Ischemic cardiomyopathy [I25.5]  AICD (automatic cardioverter/defibrillator) present [Z95.810]  Pulmonary embolism (HCC) [I26.99]  Deep vein thrombosis (HCC) [I82.409]             Anticoagulation Episode Summary     INR check location:      Preferred lab:      Send INR reminders to:      Comments:  Danika telemed           Review of Systems   HENT: Negative for nosebleeds.   Respiratory: Negative for shortness of breath.  Gastrointestinal: Negative for blood in stool.   Genitourinary: Negative for hematuria.   Psychiatric/Behavioral: The patient is not nervous/anxious.     Physical Exam   Constitutional: Oriented to person, place, and time. Appears well-developed and well-nourished.   Pulmonary/Chest: Effort normal. No respiratory distress.   Skin: Not diaphoretic.  Psychiatric: Normal mood and affect. Behavior is normal.      DENISE BazanPDEREK

## 2020-12-04 ENCOUNTER — ANTICOAGULATION MONITORING (OUTPATIENT)
Dept: VASCULAR LAB | Facility: MEDICAL CENTER | Age: 60
End: 2020-12-04
Payer: MEDICARE

## 2020-12-04 ENCOUNTER — APPOINTMENT (OUTPATIENT)
Dept: SCHEDULING | Facility: IMAGING CENTER | Age: 60
End: 2020-12-04
Payer: MEDICARE

## 2020-12-04 DIAGNOSIS — F17.200 SMOKING: ICD-10-CM

## 2020-12-04 DIAGNOSIS — I25.5 ISCHEMIC CARDIOMYOPATHY: ICD-10-CM

## 2020-12-04 DIAGNOSIS — Z95.810 AICD (AUTOMATIC CARDIOVERTER/DEFIBRILLATOR) PRESENT: ICD-10-CM

## 2020-12-04 DIAGNOSIS — I26.99 OTHER PULMONARY EMBOLISM WITHOUT ACUTE COR PULMONALE, UNSPECIFIED CHRONICITY (HCC): ICD-10-CM

## 2020-12-04 LAB — INR PPP: 1.6 (ref 2–3.5)

## 2020-12-04 PROCEDURE — 99212 OFFICE O/P EST SF 10 MIN: CPT | Performed by: NURSE PRACTITIONER

## 2020-12-04 NOTE — PROGRESS NOTES
OP Anticoagulation Service Note    Date: 20       Plan: Pt is subtherapeutic . Denies any s/s of bleeding or clotting. Denies any changes in medications or diet. Will continue warfarin dosing as follows. Follow up appointment in 1 week(s).   BP: 112/52, HR 79, 98%       FRI: 22.5mg for a one time only    THIS VISIT HAS BEEN CONDUCTED WITH [employer] UT[employer] SECURE AND ENCRYPTED VIDEO-CONFERENCING EQUIPMENT     Anticoagulation Summary  As of 2020    INR goal:  2.0-3.0   TTR:  21.6 % (1 y)   INR used for dosin.60 (2020)   Warfarin maintenance plan:  11.25 mg (7.5 mg x 1.5) every Mon, Wed, Fri; 7.5 mg (7.5 mg x 1) all other days   Weekly warfarin total:  63.75 mg   Plan last modified:  AUSTIN Bazan (2020)   Next INR check:  2020   Target end date:  Indefinite    Indications    CAD (coronary artery disease) [I25.10]  Type 2 diabetes mellitus (HCC) [E11.9]  Smoking [F17.200]  Ischemic cardiomyopathy [I25.5]  AICD (automatic cardioverter/defibrillator) present [Z95.810]  Pulmonary embolism (HCC) [I26.99]  Deep vein thrombosis (HCC) [I82.409]             Anticoagulation Episode Summary     INR check location:      Preferred lab:      Send INR reminders to:      Comments:  Danika telemed            Review of Systems   HENT: Negative for nosebleeds.   Respiratory: Negative for shortness of breath.  Gastrointestinal: Negative for blood in stool.   Genitourinary: Negative for hematuria.   Psychiatric/Behavioral: The patient is not nervous/anxious.     Physical Exam   Constitutional: Oriented to person, place, and time. Appears well-developed and well-nourished.   Pulmonary/Chest: Effort normal. No respiratory distress.   Skin: Not diaphoretic.  Psychiatric: Normal mood and affect. Behavior is normal.        MAYANK Lezama  [place] [facility]

## 2020-12-11 ENCOUNTER — ANTICOAGULATION MONITORING (OUTPATIENT)
Dept: VASCULAR LAB | Facility: MEDICAL CENTER | Age: 60
End: 2020-12-11
Payer: MEDICARE

## 2020-12-11 ENCOUNTER — APPOINTMENT (OUTPATIENT)
Dept: SCHEDULING | Facility: IMAGING CENTER | Age: 60
End: 2020-12-11
Payer: MEDICARE

## 2020-12-11 DIAGNOSIS — I26.99 OTHER PULMONARY EMBOLISM WITHOUT ACUTE COR PULMONALE, UNSPECIFIED CHRONICITY (HCC): ICD-10-CM

## 2020-12-11 DIAGNOSIS — I25.5 ISCHEMIC CARDIOMYOPATHY: ICD-10-CM

## 2020-12-11 DIAGNOSIS — Z95.810 AICD (AUTOMATIC CARDIOVERTER/DEFIBRILLATOR) PRESENT: ICD-10-CM

## 2020-12-11 DIAGNOSIS — F17.200 SMOKING: ICD-10-CM

## 2020-12-11 LAB — INR PPP: 4.8 (ref 2–3.5)

## 2020-12-11 PROCEDURE — 99212 OFFICE O/P EST SF 10 MIN: CPT | Performed by: NURSE PRACTITIONER

## 2020-12-11 NOTE — PROGRESS NOTES
OP Anticoagulation Service Note    Date: 20       Plan: Pt is supratherapeutic . Denies any s/s of bleeding or clotting. Denies any changes in medications.  His diet is very inconsistent. He has been out of green veggies for the week, as well as V8; will try to get to the store this weekend.  He does understand that dietary consistency will help stabilize his INRs.  He is going to price check a MVI and see if this is affordable.  Will continue warfarin dosing as follows. Follow up appointment in 1 week(s).   BP: 129/75, HR 78, 99%     FRI - no Coumadin    THIS VISIT HAS BEEN CONDUCTED WITH [employer] UT[employer]ING SECURE AND ENCRYPTED VIDEO-CONFERENCING EQUIPMENT     Anticoagulation Summary  As of 2020    INR goal:  2.0-3.0   TTR:  21.7 % (1 y)   INR used for dosin.80 (2020)   Warfarin maintenance plan:  11.25 mg (7.5 mg x 1.5) every Mon, Wed, Fri; 7.5 mg (7.5 mg x 1) all other days   Weekly warfarin total:  63.75 mg   Plan last modified:  AUSTIN Bazan (2020)   Next INR check:  2020   Target end date:  Indefinite    Indications    CAD (coronary artery disease) [I25.10]  Type 2 diabetes mellitus (HCC) [E11.9]  Smoking [F17.200]  Ischemic cardiomyopathy [I25.5]  AICD (automatic cardioverter/defibrillator) present [Z95.810]  Pulmonary embolism (HCC) [I26.99]  Deep vein thrombosis (HCC) [I82.409]             Anticoagulation Episode Summary     INR check location:      Preferred lab:      Send INR reminders to:      Comments:  Danika telemed            Review of Systems   HENT: Negative for nosebleeds.   Respiratory: Negative for shortness of breath.  Gastrointestinal: Negative for blood in stool.   Genitourinary: Negative for hematuria.   Psychiatric/Behavioral: The patient is not nervous/anxious.     Physical Exam   Constitutional: Oriented to person, place, and time. Appears well-developed and well-nourished.   Pulmonary/Chest: Effort normal. No  respiratory distress.   Skin: Not diaphoretic.  Psychiatric: Normal mood and affect. Behavior is normal.      MAYANK Lezama  [place] [facility]

## 2021-01-15 ENCOUNTER — TELEPHONE (OUTPATIENT)
Dept: VASCULAR LAB | Facility: MEDICAL CENTER | Age: 61
End: 2021-01-15

## 2021-01-15 NOTE — TELEPHONE ENCOUNTER
LM for pt to call and schedule telemed visit for Coumadin, via [facility] on a Friday.  He is overdue.      MAYANK Lezama  [place] [facility]

## 2021-02-05 ENCOUNTER — ANTICOAGULATION MONITORING (OUTPATIENT)
Dept: VASCULAR LAB | Facility: MEDICAL CENTER | Age: 61
End: 2021-02-05
Payer: MEDICARE

## 2021-02-05 ENCOUNTER — APPOINTMENT (OUTPATIENT)
Dept: VASCULAR LAB | Facility: MEDICAL CENTER | Age: 61
End: 2021-02-05
Payer: MEDICARE

## 2021-02-05 ENCOUNTER — TELEPHONE (OUTPATIENT)
Dept: VASCULAR LAB | Facility: MEDICAL CENTER | Age: 61
End: 2021-02-05

## 2021-02-05 DIAGNOSIS — Z95.810 AICD (AUTOMATIC CARDIOVERTER/DEFIBRILLATOR) PRESENT: ICD-10-CM

## 2021-02-05 DIAGNOSIS — I25.5 ISCHEMIC CARDIOMYOPATHY: ICD-10-CM

## 2021-02-05 DIAGNOSIS — Z79.01 CHRONIC ANTICOAGULATION: ICD-10-CM

## 2021-02-05 DIAGNOSIS — F17.200 SMOKING: ICD-10-CM

## 2021-02-05 DIAGNOSIS — I26.99 PULMONARY EMBOLISM, OTHER, UNSPECIFIED CHRONICITY, UNSPECIFIED WHETHER ACUTE COR PULMONALE PRESENT (HCC): ICD-10-CM

## 2021-02-05 DIAGNOSIS — I26.99 OTHER PULMONARY EMBOLISM WITHOUT ACUTE COR PULMONALE, UNSPECIFIED CHRONICITY (HCC): ICD-10-CM

## 2021-02-05 LAB — INR PPP: 1 (ref 2–3.5)

## 2021-02-05 PROCEDURE — 99213 OFFICE O/P EST LOW 20 MIN: CPT | Performed by: NURSE PRACTITIONER

## 2021-02-05 NOTE — PROGRESS NOTES
"OP Anticoagulation Service Note    Date: 21       Plan: Pt is subtherapeutic.  He has missed \"several doses,\" per his report; has not had INR in almost 2 mo d/t no transportation.  Will give SO today as a back-up option for testing INRs at the lab.  Denies any s/s of bleeding or clotting. Denies any changes in medications or diet. Will continue warfarin dosing as follows. Follow up appointment in 1 week(s).   He understands that his INR is low, which can increase his risk of stroke.  Discussed when to seek immediate care for stroke-like symptoms as well as VTE s/s.    BP: 133/71, HR 98, 99%    FRI/SAT/SUN-15mg     THIS VISIT HAS BEEN CONDUCTED WITH [employer] UTIL[employer]ING SECURE AND ENCRYPTED VIDEO-CONFERENCING EQUIPMENT     Anticoagulation Summary  As of 2021    INR goal:  2.0-3.0   TTR:  22.3 % (1.2 y)   INR used for dosin.00 (2021)   Warfarin maintenance plan:  11.25 mg (7.5 mg x 1.5) every Mon, Wed, Fri; 7.5 mg (7.5 mg x 1) all other days   Weekly warfarin total:  63.75 mg   Plan last modified:  AUSTIN Bazan (2020)   Next INR check:  2021   Target end date:  Indefinite    Indications    CAD (coronary artery disease) [I25.10]  Type 2 diabetes mellitus (HCC) [E11.9]  Smoking [F17.200]  Ischemic cardiomyopathy [I25.5]  AICD (automatic cardioverter/defibrillator) present [Z95.810]  Pulmonary embolism (HCC) [I26.99]  Deep vein thrombosis (HCC) [I82.409]             Anticoagulation Episode Summary     INR check location:      Preferred lab:      Send INR reminders to:      Comments:  Danika telemed            Review of Systems   HENT: Negative for nosebleeds.   Respiratory: Negative for shortness of breath.  Gastrointestinal: Negative for blood in stool.   Genitourinary: Negative for hematuria.   Psychiatric/Behavioral: The patient is not nervous/anxious.     Physical Exam   Constitutional: Oriented to person, place, and time. Appears well-developed and " well-nourished.   Pulmonary/Chest: Effort normal. No respiratory distress.   Skin: Not diaphoretic.  Psychiatric: Normal mood and affect. Behavior is normal.      MAYANK Lezama  [place] [facility]

## 2021-02-05 NOTE — TELEPHONE ENCOUNTER
Spoke with pt this AM on the phone.  He has not had an INR test in >6wks.  Scheduled him today for 0945; he will try to come in.  He has had a lot of difficulty with transportation.  He understands this is a high risk medication that requires routine monitoring.  If he is unable to come today, will mail him standing order and urge him to go to the lab this weekend or early next wk.      MAYANK Lezama  [place] [facility]

## 2021-02-12 ENCOUNTER — ANTICOAGULATION MONITORING (OUTPATIENT)
Dept: VASCULAR LAB | Facility: MEDICAL CENTER | Age: 61
End: 2021-02-12
Payer: MEDICARE

## 2021-02-12 ENCOUNTER — APPOINTMENT (OUTPATIENT)
Dept: SCHEDULING | Facility: IMAGING CENTER | Age: 61
End: 2021-02-12
Payer: MEDICARE

## 2021-02-12 DIAGNOSIS — Z95.810 AICD (AUTOMATIC CARDIOVERTER/DEFIBRILLATOR) PRESENT: ICD-10-CM

## 2021-02-12 DIAGNOSIS — I25.5 ISCHEMIC CARDIOMYOPATHY: ICD-10-CM

## 2021-02-12 DIAGNOSIS — F17.200 SMOKING: ICD-10-CM

## 2021-02-12 DIAGNOSIS — I26.99 OTHER PULMONARY EMBOLISM WITHOUT ACUTE COR PULMONALE, UNSPECIFIED CHRONICITY (HCC): ICD-10-CM

## 2021-02-12 LAB — INR PPP: 1.9 (ref 2–3.5)

## 2021-02-12 PROCEDURE — 99213 OFFICE O/P EST LOW 20 MIN: CPT | Performed by: NURSE PRACTITIONER

## 2021-02-12 NOTE — PROGRESS NOTES
"OP Anticoagulation Service Note    Date: 21       Plan: Pt is still slightly therapeutic . Denies any s/s of bleeding or clotting. Denies any changes in medications.  He does state he has had \"a lot of salads,\" recently, and plans to decrease them over the next wk. Will continue to increase warfarin dosing as follows. Follow up appointment in 1 week(s).   BP: 144/80, HR 91, 99%     THIS VISIT HAS BEEN CONDUCTED WITH [employer] UTIL[employer]ING SECURE AND ENCRYPTED VIDEO-CONFERENCING EQUIPMENT     Anticoagulation Summary  As of 2021    INR goal:  2.0-3.0   TTR:  22.0 % (1.2 y)   INR used for dosin.90 (2021)   Warfarin maintenance plan:  7.5 mg (7.5 mg x 1) every Tue, Sat; 11.25 mg (7.5 mg x 1.5) all other days   Weekly warfarin total:  71.25 mg   Plan last modified:  Breana Aguila, A.P.NMio (2021)   Next INR check:  2021   Target end date:  Indefinite    Indications    CAD (coronary artery disease) [I25.10]  Type 2 diabetes mellitus (HCC) [E11.9]  Smoking [F17.200]  Ischemic cardiomyopathy [I25.5]  AICD (automatic cardioverter/defibrillator) present [Z95.810]  Pulmonary embolism (HCC) [I26.99]  Deep vein thrombosis (HCC) [I82.409]             Anticoagulation Episode Summary     INR check location:      Preferred lab:      Send INR reminders to:      Comments:  Danika telemed            Review of Systems   HENT: Negative for nosebleeds.   Respiratory: Negative for shortness of breath.  Gastrointestinal: Negative for blood in stool.   Genitourinary: Negative for hematuria.   Psychiatric/Behavioral: The patient is not nervous/anxious.     Physical Exam   Constitutional: Oriented to person, place, and time. Appears well-developed and well-nourished.   Pulmonary/Chest: Effort normal. No respiratory distress.   Skin: Not diaphoretic.  Psychiatric: Normal mood and affect. Behavior is normal.      MAYANK Lezama  [facility]      "

## 2021-02-19 ENCOUNTER — APPOINTMENT (OUTPATIENT)
Dept: SCHEDULING | Facility: IMAGING CENTER | Age: 61
End: 2021-02-19
Payer: MEDICARE

## 2021-02-19 ENCOUNTER — ANTICOAGULATION MONITORING (OUTPATIENT)
Dept: VASCULAR LAB | Facility: MEDICAL CENTER | Age: 61
End: 2021-02-19
Payer: MEDICARE

## 2021-02-19 DIAGNOSIS — I26.99 OTHER PULMONARY EMBOLISM WITHOUT ACUTE COR PULMONALE, UNSPECIFIED CHRONICITY (HCC): ICD-10-CM

## 2021-02-19 DIAGNOSIS — Z95.810 AICD (AUTOMATIC CARDIOVERTER/DEFIBRILLATOR) PRESENT: ICD-10-CM

## 2021-02-19 DIAGNOSIS — I25.5 ISCHEMIC CARDIOMYOPATHY: ICD-10-CM

## 2021-02-19 DIAGNOSIS — F17.200 SMOKING: ICD-10-CM

## 2021-02-19 LAB — INR PPP: 4.4 (ref 2–3.5)

## 2021-02-19 PROCEDURE — 99213 OFFICE O/P EST LOW 20 MIN: CPT | Performed by: NURSE PRACTITIONER

## 2021-02-19 NOTE — PROGRESS NOTES
OP Anticoagulation Service Note    Date: 21       Plan: Pt is supratherapeutic . Denies any s/s of bleeding or clotting. Denies any changes in medications or diet.  He does have a very inconsistent diet, as far as Vit K.  No consistent means of transportation; relies on rides from his neighbor, so therefore inconsistent ability to obtain his groceries.  Will continue warfarin dosing as follows. Follow up appointment in 1 week(s).   BP: 131/74, HR 74, 99%     THIS VISIT HAS BEEN CONDUCTED WITH [employer] UTIL[employer]ING SECURE AND ENCRYPTED VIDEO-CONFERENCING EQUIPMENT     Anticoagulation Summary  As of 2021    INR goal:  2.0-3.0   TTR:  22.2 % (1.2 y)   INR used for dosin.40 (2021)   Warfarin maintenance plan:  11.25 mg (7.5 mg x 1.5) every Tue, Thu, Sat; 7.5 mg (7.5 mg x 1) all other days   Weekly warfarin total:  63.75 mg   Plan last modified:  Breana Aguila AMioP.NMio (2021)   Next INR check:  2021   Target end date:  Indefinite    Indications    CAD (coronary artery disease) [I25.10]  Type 2 diabetes mellitus (HCC) [E11.9]  Smoking [F17.200]  Ischemic cardiomyopathy [I25.5]  AICD (automatic cardioverter/defibrillator) present [Z95.810]  Pulmonary embolism (HCC) [I26.99]  Deep vein thrombosis (HCC) [I82.409]             Anticoagulation Episode Summary     INR check location:      Preferred lab:      Send INR reminders to:      Comments:  Danika telemed            Review of Systems   HENT: Negative for nosebleeds.   Respiratory: Negative for shortness of breath.  Gastrointestinal: Negative for blood in stool.   Genitourinary: Negative for hematuria.   Psychiatric/Behavioral: The patient is not nervous/anxious.     Physical Exam   Constitutional: Oriented to person, place, and time. Appears well-developed and well-nourished.   Pulmonary/Chest: Effort normal. No respiratory distress.   Skin: Not diaphoretic.  Psychiatric: Normal mood and affect. Behavior is  normal.      MAYANK Lezama  [place] [facility]

## 2021-02-26 ENCOUNTER — APPOINTMENT (OUTPATIENT)
Dept: SCHEDULING | Facility: IMAGING CENTER | Age: 61
End: 2021-02-26
Payer: MEDICARE

## 2021-02-26 ENCOUNTER — ANTICOAGULATION MONITORING (OUTPATIENT)
Dept: VASCULAR LAB | Facility: MEDICAL CENTER | Age: 61
End: 2021-02-26
Payer: MEDICARE

## 2021-02-26 DIAGNOSIS — F17.200 SMOKING: ICD-10-CM

## 2021-02-26 DIAGNOSIS — I26.99 OTHER PULMONARY EMBOLISM WITHOUT ACUTE COR PULMONALE, UNSPECIFIED CHRONICITY (HCC): ICD-10-CM

## 2021-02-26 DIAGNOSIS — Z95.810 AICD (AUTOMATIC CARDIOVERTER/DEFIBRILLATOR) PRESENT: ICD-10-CM

## 2021-02-26 DIAGNOSIS — I25.5 ISCHEMIC CARDIOMYOPATHY: ICD-10-CM

## 2021-02-26 DIAGNOSIS — Z79.01 CHRONIC ANTICOAGULATION: ICD-10-CM

## 2021-02-26 LAB — INR PPP: 7 (ref 2–3.5)

## 2021-02-26 PROCEDURE — 99214 OFFICE O/P EST MOD 30 MIN: CPT | Performed by: NURSE PRACTITIONER

## 2021-02-26 NOTE — PROGRESS NOTES
"OP Anticoagulation Service Note    Date: 21       Plan: Pt is now supratherapeutic, despite holding last wk and decrease in weekly dosage.  It is unclear whether or not he is following the regimen we are providing him.  Also, his diet changes rapidly based on his ability to get to/from the grocery store.  Denies any s/s of bleeding or clotting. Denies any changes in medications or diet. Will have the pt HOLD warfarin at present; f/u INR at the lab in 3 days.  Discussed switch to DOAC d/t low TTR and drug/food interaction with warfarin.  Pt is in agreement and would like to \"price check\" this med.  Rx sent.  Pt understands NOT TO START Xarelto, unless instructed by our clinic next wk.   BP: 129/72, HR 86, 98%    HOLD COUMADIN until INR 3/1/21 at [facility]. (will also get BMP to assess renal fxn and CBC for baseline.)     THIS VISIT HAS BEEN CONDUCTED WITH [employer] UTILIZING SECURE AND ENCRYPTED VIDEO-CONFERENCING EQUIPMENT     Anticoagulation Summary  As of 2021    INR goal:  2.0-3.0   TTR:  21.9 % (1.3 y)   INR used for dosin.00 (2021)   Warfarin maintenance plan:  11.25 mg (7.5 mg x 1.5) every e, Thu, Sat; 7.5 mg (7.5 mg x 1) all other days   Weekly warfarin total:  63.75 mg   Plan last modified:  Breana Aguila AMioP.NMio (2021)   Next INR check:  3/1/2021   Target end date:  Indefinite    Indications    CAD (coronary artery disease) [I25.10]  Type 2 diabetes mellitus (HCC) [E11.9]  Smoking [F17.200]  Ischemic cardiomyopathy [I25.5]  AICD (automatic cardioverter/defibrillator) present [Z95.810]  Pulmonary embolism (HCC) [I26.99]  Deep vein thrombosis (HCC) [I82.409]             Anticoagulation Episode Summary     INR check location:      Preferred lab:      Send INR reminders to:      Comments:  Danika telemed            Review of Systems   HENT: Negative for nosebleeds.   Respiratory: Negative for shortness of breath.  Gastrointestinal: Negative for blood " in stool.   Genitourinary: Negative for hematuria.   Psychiatric/Behavioral: The patient is not nervous/anxious.     Physical Exam   Constitutional: Oriented to person, place, and time. Appears well-developed and well-nourished.   Pulmonary/Chest: Effort normal. No respiratory distress.   Skin: Not diaphoretic.  Psychiatric: Normal mood and affect. Behavior is normal.      MAYANK Lezama  [place] [facility]

## 2021-03-01 ENCOUNTER — TELEPHONE (OUTPATIENT)
Dept: MEDICAL GROUP | Facility: MEDICAL CENTER | Age: 61
End: 2021-03-01

## 2021-03-02 ENCOUNTER — ANTICOAGULATION MONITORING (OUTPATIENT)
Dept: VASCULAR LAB | Facility: MEDICAL CENTER | Age: 61
End: 2021-03-02

## 2021-03-02 ENCOUNTER — TELEPHONE (OUTPATIENT)
Dept: VASCULAR LAB | Facility: MEDICAL CENTER | Age: 61
End: 2021-03-02

## 2021-03-02 DIAGNOSIS — F17.200 SMOKING: ICD-10-CM

## 2021-03-02 DIAGNOSIS — Z79.01 CHRONIC ANTICOAGULATION: ICD-10-CM

## 2021-03-02 DIAGNOSIS — I26.99 OTHER PULMONARY EMBOLISM WITHOUT ACUTE COR PULMONALE, UNSPECIFIED CHRONICITY (HCC): ICD-10-CM

## 2021-03-02 DIAGNOSIS — I25.5 ISCHEMIC CARDIOMYOPATHY: ICD-10-CM

## 2021-03-02 DIAGNOSIS — I82.409 DEEP VEIN THROMBOSIS (DVT) OF LOWER EXTREMITY, UNSPECIFIED CHRONICITY, UNSPECIFIED LATERALITY, UNSPECIFIED VEIN (HCC): ICD-10-CM

## 2021-03-02 DIAGNOSIS — Z95.810 AICD (AUTOMATIC CARDIOVERTER/DEFIBRILLATOR) PRESENT: ICD-10-CM

## 2021-03-02 LAB — INR PPP: 1.5 (ref 2–3.5)

## 2021-03-02 NOTE — TELEPHONE ENCOUNTER
Pt was supposed to get INR drawn today, however we have not received an INR result from [facility]. M with pt asking if he got his INR drawn today. If not, pt needs to go to the lab tomorrow.    Sent future message to the [employer] to ensure proper f/u.    Ksenia Perez, ConstantinoD

## 2021-03-02 NOTE — PROGRESS NOTES
OP   Telephone Anticoagulation Service Note      Anticoagulation Summary  As of 3/2/2021    INR goal:  2.0-3.0   TTR:  21.9 % (1.3 y)   INR used for dosin.50 (3/2/2021)   Warfarin maintenance plan:  No maintenance plan   Plan last modified:  Eli Russo (3/2/2021)   Next INR check:  3/30/2021   Target end date:  Indefinite    Indications    CAD (coronary artery disease) [I25.10]  Type 2 diabetes mellitus (HCC) [E11.9]  Smoking [F17.200]  Ischemic cardiomyopathy [I25.5]  AICD (automatic cardioverter/defibrillator) present [Z95.810]  Pulmonary embolism (HCC) [I26.99]  Deep vein thrombosis (HCC) [I82.409]             Anticoagulation Episode Summary     INR check location:      Preferred lab:      Send INR reminders to:      Comments:  Danika telemed        Anticoagulation Patient Findings     Spoke with the patient on the phone today, reporting a SUB-therapeutic INR of 1.5.  Patient will d/c warfarin and start on Xarelto.   Patient asked to call the clinic with any unusual bleeding or buising with Xarelto and will go back to lab for follow up labs in 4 weeks.   Patient knows to call the clinic with any questions or concerns.   Lab orders faxed to [facility].      Ricci MeeksD

## 2021-03-02 NOTE — TELEPHONE ENCOUNTER
Received CMP and CBC from [facility] drawn on 3/2/21, but no INR.    CrCl > 50 ml/min  CBC wnl    Results sent to be scanned in media tab    Attempted to reach [facility] medical records ([phone]) to obtain INR result, but there was no answer and no VM box.    Called pt's pharmacy. Copay for Xarelto is $9.27 for 30 day supply.    S/w pt. Informed pt regarding above. Sent standing INR order to [facility]. Pt to get INR drawn tomorrow. Pt to start Xarelto when INR is less than 3.0. Pt verbalized understanding.    Ksenia Perez, PharmD

## 2021-04-06 ENCOUNTER — TELEPHONE (OUTPATIENT)
Dept: VASCULAR LAB | Facility: MEDICAL CENTER | Age: 61
End: 2021-04-06

## 2021-04-06 NOTE — TELEPHONE ENCOUNTER
Spoke with pt via phone; reviewed 4/2/21 labwork showing stable renal fxn.    Cockcroft & Gault (Actual body weight): 102.6 (mL/min)   Cockcroft & Gault  (Adjusted BW): 97.4 (mL/min) .    He is to continue Xarelto 20mg once/day and f/u with blood work and telemed appt in 6mo.      MAYANK Lezama  [place] [facility]

## 2021-04-26 ENCOUNTER — ANTICOAGULATION MONITORING (OUTPATIENT)
Dept: VASCULAR LAB | Facility: MEDICAL CENTER | Age: 61
End: 2021-04-26

## 2021-04-26 DIAGNOSIS — I26.99 OTHER PULMONARY EMBOLISM WITHOUT ACUTE COR PULMONALE, UNSPECIFIED CHRONICITY (HCC): ICD-10-CM

## 2021-04-26 DIAGNOSIS — Z95.810 AICD (AUTOMATIC CARDIOVERTER/DEFIBRILLATOR) PRESENT: ICD-10-CM

## 2021-04-26 DIAGNOSIS — I25.5 ISCHEMIC CARDIOMYOPATHY: ICD-10-CM

## 2021-04-26 DIAGNOSIS — I82.409 DEEP VEIN THROMBOSIS (DVT) OF LOWER EXTREMITY, UNSPECIFIED CHRONICITY, UNSPECIFIED LATERALITY, UNSPECIFIED VEIN (HCC): ICD-10-CM

## 2021-04-26 DIAGNOSIS — F17.200 SMOKING: ICD-10-CM

## 2021-04-26 NOTE — PROGRESS NOTES
Normal mammogram, [facility] year[facility] [place] [facility]    Patient name came up on overdue INR list so did chart review and closed episode for warfarin anticoagulation. Patient on Xarelto.       Ricci Russo  PharmD

## 2021-05-19 ENCOUNTER — ANTICOAGULATION MONITORING (OUTPATIENT)
Dept: VASCULAR LAB | Facility: MEDICAL CENTER | Age: 61
End: 2021-05-19

## 2021-05-19 DIAGNOSIS — Z95.810 AICD (AUTOMATIC CARDIOVERTER/DEFIBRILLATOR) PRESENT: ICD-10-CM

## 2021-05-19 DIAGNOSIS — F17.200 SMOKING: ICD-10-CM

## 2021-05-19 DIAGNOSIS — I25.5 ISCHEMIC CARDIOMYOPATHY: ICD-10-CM

## 2021-09-23 ENCOUNTER — TELEPHONE (OUTPATIENT)
Dept: CARDIOLOGY | Facility: MEDICAL CENTER | Age: 61
End: 2021-09-23

## 2021-09-23 DIAGNOSIS — I50.9 HEART FAILURE, NYHA CLASS 3 (HCC): ICD-10-CM

## 2021-09-23 DIAGNOSIS — I50.20 ACC/AHA STAGE C SYSTOLIC HEART FAILURE (HCC): ICD-10-CM

## 2021-09-23 NOTE — TELEPHONE ENCOUNTER
Returned pt's call. Pt states he had a very brief episode of feeling warm and lightheaded. Then he felt ok.     To ROHINI ADAMS

## 2021-09-23 NOTE — TELEPHONE ENCOUNTER
Pt returned call. Tried reaching nurse, but unavailable. Please call Pt back at [phone]. Pt will be home this evening and tomorrow after 1pm.    Thank you

## 2021-09-23 NOTE — TELEPHONE ENCOUNTER
Remote Transmission 9/23/2021:    1-VF episode w/ATP 9/22/2021@8:01pm lasting 14 secs.     Full report scanned into media.

## 2021-09-24 DIAGNOSIS — I50.20 ACC/AHA STAGE C SYSTOLIC HEART FAILURE (HCC): ICD-10-CM

## 2021-09-24 DIAGNOSIS — I50.9 HEART FAILURE, NYHA CLASS 3 (HCC): ICD-10-CM

## 2021-09-24 RX ORDER — METOPROLOL SUCCINATE 50 MG/1
50 TABLET, EXTENDED RELEASE ORAL DAILY
Qty: 90 TABLET | Refills: 3 | Status: SHIPPED | OUTPATIENT
Start: 2021-09-24 | End: 2022-02-15 | Stop reason: SDUPTHER

## 2021-09-24 NOTE — TELEPHONE ENCOUNTER
Serafin Lomax M.D.  You; Molly Bonner R.N. 1 hour ago (12:47 PM)     Thanks gukeith! Sarah, would you mind increasing his metoprolol succinate to 50mg daily if his vitals are stable and lets have him repeat echocardiogram. He will need follow up asap with RS. Thanks team!      Called pt. He doesn't have a BP cuff and doesn't keep track of vitals. Discussed increasing metoprolol to 50mg daily, and instructed pt to obtain a BP cuff to monitor vitals. Rx sent. Also discussed repeating echo. Order faxed to [facility] at [phone]. Receipt confirmed. Discussed scheduling f/u appt, but pt doesn't have a car and won't be able to come to [place]. Offered to schedule a virtual appt, but pt says that he does not have data on his phone nor does he have Wifi or any place where he can go to access Wifi. Advised that he schedule the next available cardiology telemedicine appt. Transferred to telemedicine schedulers.

## 2021-10-19 DIAGNOSIS — Z79.01 CHRONIC ANTICOAGULATION: ICD-10-CM

## 2021-11-22 ENCOUNTER — APPOINTMENT (OUTPATIENT)
Dept: SCHEDULING | Facility: IMAGING CENTER | Age: 61
End: 2021-11-22
Payer: MEDICARE

## 2021-11-22 ENCOUNTER — ANTICOAGULATION MONITORING (OUTPATIENT)
Dept: VASCULAR LAB | Facility: MEDICAL CENTER | Age: 61
End: 2021-11-22
Payer: MEDICARE

## 2021-11-22 DIAGNOSIS — F17.200 SMOKING: ICD-10-CM

## 2021-11-22 DIAGNOSIS — I26.99 OTHER PULMONARY EMBOLISM WITHOUT ACUTE COR PULMONALE, UNSPECIFIED CHRONICITY (HCC): ICD-10-CM

## 2021-11-22 DIAGNOSIS — I25.5 ISCHEMIC CARDIOMYOPATHY: ICD-10-CM

## 2021-11-22 DIAGNOSIS — Z95.810 AICD (AUTOMATIC CARDIOVERTER/DEFIBRILLATOR) PRESENT: ICD-10-CM

## 2021-11-22 DIAGNOSIS — I82.4Z9 DEEP VEIN THROMBOSIS (DVT) OF DISTAL VEIN OF LOWER EXTREMITY, UNSPECIFIED CHRONICITY, UNSPECIFIED LATERALITY (HCC): ICD-10-CM

## 2021-11-22 PROCEDURE — 99213 OFFICE O/P EST LOW 20 MIN: CPT | Performed by: NURSE PRACTITIONER

## 2021-11-22 NOTE — PROGRESS NOTES
OP Anticoagulation Service Note    Date: 11/22/21       Plan: Pt is here today to f/u on DOAC.  Remains on Xarelto 20mg daily w/o issue of cost or any bleeding concerns  Takes regularly  Med rec updated; recently started on metoprolol per cards  Is due for blood work (MA at [facility] will print for him today)   Vitals: /68, HR 76, 99%     THIS VISIT HAS BEEN CONDUCTED WITH PRESENTER VIA TELEMEDICINE UTILIZING SECURE AND ENCRYPTED VIDEO-CONFERENCING EQUIPMENT           Review of Systems   HENT: Negative for nosebleeds.   Respiratory: Negative for shortness of breath.  Gastrointestinal: Negative for blood in stool.   Genitourinary: Negative for hematuria.   Psychiatric/Behavioral: The patient is not nervous/anxious.     Physical Exam   Constitutional: Oriented to person, place, and time. Appears well-developed and well-nourished.   Pulmonary/Chest: Effort normal. No respiratory distress.   Skin: Not diaphoretic.  Psychiatric: Normal mood and affect. Behavior is normal.        Labs to be completed: BMP, CBC  Pt urged to sched telemed f/u with cardiology, phone number given.    Follow up: 6mo, pending normal labwork      MAYANK Lezama  [facility]

## 2021-12-14 DIAGNOSIS — Z95.810 AICD (AUTOMATIC CARDIOVERTER/DEFIBRILLATOR) PRESENT: ICD-10-CM

## 2021-12-14 DIAGNOSIS — I25.10 CORONARY ARTERY DISEASE INVOLVING NATIVE HEART WITHOUT ANGINA PECTORIS, UNSPECIFIED VESSEL OR LESION TYPE: ICD-10-CM

## 2021-12-14 DIAGNOSIS — I44.2 COMPLETE HEART BLOCK BY ELECTROCARDIOGRAM (HCC): ICD-10-CM

## 2021-12-14 NOTE — PROGRESS NOTES
EKG order  Received: Today  Tiffany Vicente, Med Ass't  DEVON Sarmiento,        Could you please order EKG for patients telemed appt with Dr. NOBLE on 12/30/2021.     Thank you so much     Tiffany     EKG order placed.

## 2021-12-15 ENCOUNTER — TELEPHONE (OUTPATIENT)
Dept: CARDIOLOGY | Facility: MEDICAL CENTER | Age: 61
End: 2021-12-15

## 2021-12-15 NOTE — TELEPHONE ENCOUNTER
Called patient yesterday. He said he did have the echo done at [facility].   Faxed STAT request to [facility]# [phone]  Fax# [phone]    To be faxed to our medical records dept. [phone]

## 2021-12-16 DIAGNOSIS — I50.9 HEART FAILURE, NYHA CLASS 3 (HCC): ICD-10-CM

## 2021-12-16 DIAGNOSIS — I50.20 ACC/AHA STAGE C SYSTOLIC HEART FAILURE (HCC): ICD-10-CM

## 2022-01-05 ENCOUNTER — TELEPHONE (OUTPATIENT)
Dept: CARDIOLOGY | Facility: MEDICAL CENTER | Age: 62
End: 2022-01-05

## 2022-01-06 NOTE — TELEPHONE ENCOUNTER
Serafin Lomax M.D.   1/4/2022 10:31 PM PST         Mr Ross,  Your echo shows your cardiac function remains low. Please schedule the next available appointment with me to further discuss.  Best,  Dr Lomax     Called pt and notified of echo results. Pt is currently scheduled for f/u with a telemedicine appt with RO on 2/15.

## 2022-01-31 ENCOUNTER — TELEPHONE (OUTPATIENT)
Dept: CARDIOLOGY | Facility: MEDICAL CENTER | Age: 62
End: 2022-01-31

## 2022-01-31 DIAGNOSIS — I44.2 COMPLETE HEART BLOCK BY ELECTROCARDIOGRAM (HCC): ICD-10-CM

## 2022-01-31 NOTE — TELEPHONE ENCOUNTER
Cynthia Charles, [facility] Ass't  DEVON Bowen     Could we order an EKG for patient. Patient is scheduled of TELMED on 02/15/2022 with  @ 2:20om.       Thank you.

## 2022-02-15 ENCOUNTER — TELEMEDICINE2 (OUTPATIENT)
Dept: CARDIOLOGY | Facility: MEDICAL CENTER | Age: 62
End: 2022-02-15
Payer: MEDICARE

## 2022-02-15 ENCOUNTER — TELEPHONE (OUTPATIENT)
Dept: CARDIOLOGY | Facility: MEDICAL CENTER | Age: 62
End: 2022-02-15

## 2022-02-15 VITALS
HEIGHT: 70 IN | HEART RATE: 77 BPM | DIASTOLIC BLOOD PRESSURE: 60 MMHG | BODY MASS INDEX: 23.77 KG/M2 | OXYGEN SATURATION: 97 % | TEMPERATURE: 97.8 F | SYSTOLIC BLOOD PRESSURE: 92 MMHG | RESPIRATION RATE: 16 BRPM | WEIGHT: 166 LBS

## 2022-02-15 DIAGNOSIS — I50.9 HEART FAILURE, NYHA CLASS 3 (HCC): ICD-10-CM

## 2022-02-15 DIAGNOSIS — I26.99 OTHER PULMONARY EMBOLISM WITHOUT ACUTE COR PULMONALE, UNSPECIFIED CHRONICITY (HCC): ICD-10-CM

## 2022-02-15 DIAGNOSIS — I25.10 CORONARY ARTERY DISEASE INVOLVING NATIVE CORONARY ARTERY OF NATIVE HEART WITHOUT ANGINA PECTORIS: ICD-10-CM

## 2022-02-15 DIAGNOSIS — Z95.810 AICD (AUTOMATIC CARDIOVERTER/DEFIBRILLATOR) PRESENT: ICD-10-CM

## 2022-02-15 DIAGNOSIS — I24.9 ACUTE CORONARY SYNDROME (HCC): ICD-10-CM

## 2022-02-15 DIAGNOSIS — F17.200 SMOKING: ICD-10-CM

## 2022-02-15 DIAGNOSIS — I50.20 ACC/AHA STAGE C SYSTOLIC HEART FAILURE (HCC): ICD-10-CM

## 2022-02-15 DIAGNOSIS — I25.5 ISCHEMIC CARDIOMYOPATHY: ICD-10-CM

## 2022-02-15 DIAGNOSIS — I82.409 DEEP VEIN THROMBOSIS (DVT) OF LOWER EXTREMITY, UNSPECIFIED CHRONICITY, UNSPECIFIED LATERALITY, UNSPECIFIED VEIN (HCC): ICD-10-CM

## 2022-02-15 PROCEDURE — 99215 OFFICE O/P EST HI 40 MIN: CPT | Mod: 95 | Performed by: INTERNAL MEDICINE

## 2022-02-15 RX ORDER — ATORVASTATIN CALCIUM 80 MG/1
80 TABLET, FILM COATED ORAL EVERY EVENING
Qty: 90 TABLET | Refills: 3 | Status: SHIPPED | OUTPATIENT
Start: 2022-02-15

## 2022-02-15 RX ORDER — LOSARTAN POTASSIUM 100 MG/1
100 TABLET ORAL DAILY
Qty: 90 TABLET | Refills: 3 | Status: SHIPPED | OUTPATIENT
Start: 2022-02-15

## 2022-02-15 RX ORDER — METOPROLOL SUCCINATE 50 MG/1
50 TABLET, EXTENDED RELEASE ORAL DAILY
Qty: 90 TABLET | Refills: 3 | Status: SHIPPED | OUTPATIENT
Start: 2022-02-15

## 2022-02-15 RX ORDER — DAPAGLIFLOZIN 10 MG/1
10 TABLET, FILM COATED ORAL DAILY
Qty: 30 TABLET | Refills: 11 | Status: SHIPPED | OUTPATIENT
Start: 2022-02-15

## 2022-02-15 ASSESSMENT — ENCOUNTER SYMPTOMS
GASTROINTESTINAL NEGATIVE: 1
CONSTITUTIONAL NEGATIVE: 1
STRIDOR: 0
ORTHOPNEA: 0
SHORTNESS OF BREATH: 0
CHILLS: 0
LOSS OF CONSCIOUSNESS: 0
MUSCULOSKELETAL NEGATIVE: 1
PALPITATIONS: 0
COUGH: 0
CARDIOVASCULAR NEGATIVE: 1
WHEEZING: 0
DIZZINESS: 0
SORE THROAT: 0
FEVER: 0
RESPIRATORY NEGATIVE: 1
EYES NEGATIVE: 1
CLAUDICATION: 0
HEMOPTYSIS: 0
PND: 0
NEUROLOGICAL NEGATIVE: 1
WEAKNESS: 0
SPUTUM PRODUCTION: 0
BRUISES/BLEEDS EASILY: 0

## 2022-02-15 NOTE — PROGRESS NOTES
Chief Complaint   Patient presents with   • Congestive Heart Failure     Fv Dx: ACC/AHA stage C systolic heart failure (HCC)       Vince Ross is a 61 y.o. male who presents today as a follow-up for his multivessel coronary disease and heart failure with reduced ejection fraction.  Since he was last seen he continues to be relatively asymptomatic.  He is not having chest pain.  His last echo showed a reduced EF.  His ICD shows no issues.      Past Medical History:   Diagnosis Date   • Acute coronary syndrome ([facility])    • CAD (coronary artery disease) 11/2018    Coronary angiogram with % occluded, previous PTCA of LAD patent, MARLEY 90%, non-amenable to stenting or bypass. October 2018: PTCA to LAD   • Diabetes     type II   • DM (diabetes mellitus) ([facility]) 9/24/2018   • HTN (hypertension), malignant    • Hyperlipidemia    • Ischemic cardiomyopathy 09/2018    Echocardiogram with mild global hypokinesis, LAD severe hypo to akinesis, and LVEF 20-25%   • Rheumatic fever 1974   • Smoking    • STEMI (ST elevation myocardial infarction) ([facility]) 9/24/2018     Past Surgical History:   Procedure Laterality Date   • AICD IMPLANT Left 09/26/2018    [facility] [address] [id number] implanted by Dr. Cole.   • CERVICAL DISK AND FUSION ANTERIOR  4/23/2010    Performed by ESTHER BELL at SURGERY [facility] ORS   • OTHER ORTHOPEDIC SURGERY  1975    right leg      Family History   Problem Relation Age of Onset   • Cancer Mother    • Heart Disease Brother         CABGx3     Social History     Socioeconomic History   • Marital status: Single     Spouse name: Not on file   • Number of children: Not on file   • Years of education: Not on file   • Highest education level: Not on file   Occupational History   • Not on file   Tobacco Use   • Smoking status: Former Smoker     Packs/day: 1.00     Years: 40.00     Pack years: 40.00     Types: Cigarettes     Quit date: 9/28/2018     Years since quitting: 3.3   •  Smokeless tobacco: Former User     Types: Chew     Quit date: 10/16/2008   • Tobacco comment: 1 pack isabel day and a half   Substance and Sexual Activity   • Alcohol use: Yes     Alcohol/week: 7.2 oz     Types: 12 Cans of beer per week     Comment: 12 pack in a month   • Drug use: No   • Sexual activity: Not on file   Other Topics Concern   • Not on file   Social History Narrative   • Not on file     Social Determinants of Health     Financial Resource Strain:    • Difficulty of Paying Living Expenses: Not on file   Food Insecurity:    • Worried About Running Out of Food in the Last Year: Not on file   • Ran Out of Food in the Last Year: Not on file   Transportation Needs:    • Lack of Transportation (Medical): Not on file   • Lack of Transportation (Non-Medical): Not on file   Physical Activity:    • Days of Exercise per Week: Not on file   • Minutes of Exercise per Session: Not on file   Stress:    • Feeling of Stress : Not on file   Social Connections:    • Frequency of Communication with Friends and Family: Not on file   • Frequency of Social Gatherings with Friends and Family: Not on file   • Attends Islam Services: Not on file   • Active Member of Clubs or Organizations: Not on file   • Attends Club or Organization Meetings: Not on file   • Marital Status: Not on file   Intimate Partner Violence:    • Fear of Current or Ex-Partner: Not on file   • Emotionally Abused: Not on file   • Physically Abused: Not on file   • Sexually Abused: Not on file   Housing Stability:    • Unable to Pay for Housing in the Last Year: Not on file   • Number of Places Lived in the Last Year: Not on file   • Unstable Housing in the Last Year: Not on file     No Known Allergies  Outpatient Encounter Medications as of 2/15/2022   Medication Sig Dispense Refill   • metoprolol SR (TOPROL XL) 50 MG TABLET SR 24 HR Take 1 Tablet by mouth every day. 90 Tablet 3   • losartan (COZAAR) 100 MG Tab Take 1 Tablet by mouth every day. 90 Tablet  3   • atorvastatin (LIPITOR) 80 MG tablet Take 1 Tablet by mouth every evening. 90 Tablet 3   • Dapagliflozin Propanediol (FARXIGA) 10 MG Tab Take 10 mg by mouth every day. 30 Tablet 11   • rivaroxaban (XARELTO) 20 MG Tab tablet Take 1 tablet by mouth with dinner. 30 tablet 11   • omeprazole (PRILOSEC) 20 MG delayed-release capsule Take 20 mg by mouth every day.     • metFORMIN (GLUCOPHAGE) 500 MG Tab Take 500 mg by mouth 2 times a day, with meals.     • insulin glargine (LANTUS) 100 UNIT/ML Solution Inject  as instructed every evening.     • spironolactone (ALDACTONE) 25 MG Tab Take 1 Tab by mouth every day. 90 Tab 3   • aspirin 81 MG EC tablet Take 1 Tab by mouth every day. 90 Tab 3   • nitroglycerin (NITROSTAT) 0.4 MG SL Tab Place 1 Tab under tongue as needed for Chest Pain (up to 3 doses (if SBP greater than 90 mmHg)). 25 Tab 3   • [DISCONTINUED] metoprolol SR (TOPROL XL) 50 MG TABLET SR 24 HR Take 1 Tablet by mouth every day. 90 Tablet 3   • LANTUS SOLOSTAR 100 UNIT/ML Solution Pen-injector injection  (Patient not taking: Reported on 2/15/2022)     • [DISCONTINUED] losartan (COZAAR) 100 MG Tab Take 1 Tab by mouth every day. 30 Tab 6   • [DISCONTINUED] atorvastatin (LIPITOR) 80 MG tablet Take 1 Tab by mouth every evening. 90 Tab 3     No facility-administered encounter medications on file as of 2/15/2022.     Review of Systems   Constitutional: Negative.  Negative for chills, fever and malaise/fatigue.   HENT: Negative.  Negative for sore throat.    Eyes: Negative.    Respiratory: Negative.  Negative for cough, hemoptysis, sputum production, shortness of breath, wheezing and stridor.    Cardiovascular: Negative.  Negative for chest pain, palpitations, orthopnea, claudication, leg swelling and PND.   Gastrointestinal: Negative.    Genitourinary: Negative.    Musculoskeletal: Negative.    Skin: Negative.    Neurological: Negative.  Negative for dizziness, loss of consciousness and weakness.   Endo/Heme/Allergies:  "Negative.  Does not bruise/bleed easily.   All other systems reviewed and are negative.             Objective     BP (!) 92/60 (BP Location: Left arm, Patient Position: Sitting, BP Cuff Size: Adult)   Pulse 77   Temp 36.6 °C (97.8 °F)   Resp 16   Ht 1.778 m (5' 10\")   Wt 75.3 kg (166 lb)   SpO2 97%   BMI 23.82 kg/m²     Physical Exam  Vitals and nursing note reviewed.   Constitutional:       General: He is not in acute distress.     Appearance: He is well-developed. He is not diaphoretic.   HENT:      Head: Normocephalic and atraumatic.      Right Ear: External ear normal.      Left Ear: External ear normal.      Nose: Nose normal.      Mouth/Throat:      Pharynx: No oropharyngeal exudate.   Eyes:      General: No scleral icterus.        Right eye: No discharge.         Left eye: No discharge.      Conjunctiva/sclera: Conjunctivae normal.      Pupils: Pupils are equal, round, and reactive to light.   Neck:      Vascular: No JVD.   Cardiovascular:      Rate and Rhythm: Normal rate and regular rhythm.      Heart sounds: No murmur heard.  No friction rub. No gallop.    Pulmonary:      Effort: Pulmonary effort is normal. No respiratory distress.      Breath sounds: No stridor. No wheezing or rales.   Chest:      Chest wall: No tenderness.   Abdominal:      General: There is no distension.      Palpations: Abdomen is soft.      Tenderness: There is no guarding.   Musculoskeletal:         General: No tenderness or deformity. Normal range of motion.      Cervical back: Neck supple.   Skin:     General: Skin is warm and dry.      Coloration: Skin is not pale.      Findings: No erythema or rash.   Neurological:      Mental Status: He is alert.      Cranial Nerves: No cranial nerve deficit.      Motor: No abnormal muscle tone.      Coordination: Coordination normal.      Deep Tendon Reflexes: Reflexes are normal and symmetric. Reflexes normal.   Psychiatric:         Behavior: Behavior normal.         Thought Content: " Thought content normal.         Judgment: Judgment normal.                Assessment & Plan     1. Coronary artery disease involving native coronary artery of native heart without angina pectoris     2. Ischemic cardiomyopathy     3. Acute coronary syndrome (HCC)  Dapagliflozin Propanediol (FARXIGA) 10 MG Tab   4. AICD (automatic cardioverter/defibrillator) present  Dapagliflozin Propanediol (FARXIGA) 10 MG Tab   5. Other pulmonary embolism without acute cor pulmonale, unspecified chronicity (HCC)  losartan (COZAAR) 100 MG Tab    Dapagliflozin Propanediol (FARXIGA) 10 MG Tab   6. ACC/AHA stage C systolic heart failure (HCC)  metoprolol SR (TOPROL XL) 50 MG TABLET SR 24 HR    losartan (COZAAR) 100 MG Tab    atorvastatin (LIPITOR) 80 MG tablet    Dapagliflozin Propanediol (FARXIGA) 10 MG Tab   7. Deep vein thrombosis (DVT) of lower extremity, unspecified chronicity, unspecified laterality, unspecified vein (HCC)  losartan (COZAAR) 100 MG Tab    Dapagliflozin Propanediol (FARXIGA) 10 MG Tab   8. Smoking  losartan (COZAAR) 100 MG Tab   9. Heart failure, NYHA class 3 (HCC)  metoprolol SR (TOPROL XL) 50 MG TABLET SR 24 HR       Medical Decision Making: Today's Assessment/Status/Plan:        60 yo M with CAD and MV CAD.  For his T2DM and CHF I will start Farxiga 10 mg.  I told him to stay hydrated.  Otherwise he is maximized on his medical therapy.  I will see him back in 6 months.    Start time: 2:20  Stop time: 2:35    This evaluation was conducted via [employer] using secure and encrypted videoconferencing technology. The patient was physically located at [facility] in [place], [place]. The patient was presented by a medical professional at the originating site.   The patient's identity was confirmed and verbal consent was obtained for this telemedicine encounter.

## 2022-02-15 NOTE — TELEPHONE ENCOUNTER
Mailed patients AVS to home address on file, verified at check in.  No orders for pt to complete per RO

## 2022-05-09 ENCOUNTER — DOCUMENTATION (OUTPATIENT)
Dept: VASCULAR LAB | Facility: MEDICAL CENTER | Age: 62
End: 2022-05-09

## 2022-05-09 NOTE — PROGRESS NOTES
Left message for patient to call back and reschedule DOAC f/u. Patient will need labs prior to appt.

## 2022-05-09 NOTE — PROGRESS NOTES
Pt no-showed anticoag f/u scheduled for today.  Will have MA call to r/s; will also get most recent bmp/cbc from [facility].      MAYANK Lezama  [facility]

## 2022-05-20 ENCOUNTER — TELEPHONE (OUTPATIENT)
Dept: VASCULAR LAB | Facility: MEDICAL CENTER | Age: 62
End: 2022-05-20
Payer: MEDICARE

## 2022-05-20 NOTE — TELEPHONE ENCOUNTER
Called patient and left message regarding below information and requesting a call back.    ----- Message from AUSTIN Bazan sent at 5/9/2022 11:40 AM PDT -----  Regarding: no show  Pt no-showed his DOAC f/u via telemed today.  Please call him and remind him to r/s for telemed on a Monday in [place].  Also, if he hasn't had labs (cbc, bmp or cmp) within the last 3mo, he will need new labs prior to his f/u with me.    Thanks  S

## 2022-05-23 ENCOUNTER — DOCUMENTATION (OUTPATIENT)
Dept: VASCULAR LAB | Facility: MEDICAL CENTER | Age: 62
End: 2022-05-23
Payer: MEDICARE

## 2023-11-20 NOTE — PROGRESS NOTES
Inpatient Anticoagulation Service Note    Date: 10/1/2018  Reason for Anticoagulation: Atrial Fibrillation   [id number] VASc Score: 3    Hemoglobin Value: (!) 12.6  Hematocrit Value: (!) 35.9  Lab Platelet Value: 253  Target INR: 2.0 to 3.0    INR from last 7 days     Date/Time INR Value    10/01/18 0240 (!)  1.23    18 0456 (!)  1.14    18 1500  1.13    18 2100 (!)  2.49    18 1722 (!)  1.34        Dose from last 7 days     Date/Time Dose (mg)    10/01/18 1200  5    18 1600  0    18 1600  5        Significant Interactions: Amiodarone, Aspirin  Bridge Therapy: No  Bridge Therapy Start Date: 18  Days of Overlap Therapy: 0  Reversal Agent Administered: Not Applicable    Assessment: Nisa presented with STEMI due to LV dysfunction.  He is too high risk for stent sugery. Cardiology has decided to treat with medical therapy and to re-evaluate in 4-6 weeks. Heparin was initiated, but has been discontinued today. Pt has history of persistent afib.  Warfarin 5 mg was initated on  and dose was missed on . H/H is stable and there are no reported s/s of bleeding.    Plan:  Continue warfarin 5 mg daily and follow up INR in the AM.  Education Material Provided?: Yes (By pharmacy intern)  Pharmacist suggested discharge dosin mg by mouth daily with INR follow-up 24-48 hours after discharge.     Louis Kenny  Pharmacy Intern    Co-signature:  Agree with above assessment and plan.  Cori Carrera, Pharm.D., BCPS                 Griseofulvin Counseling:  I discussed with the patient the risks of griseofulvin including but not limited to photosensitivity, cytopenia, liver damage, nausea/vomiting and severe allergy.  The patient understands that this medication is best absorbed when taken with a fatty meal (e.g., ice cream or french fries).

## 2024-10-28 NOTE — PROGRESS NOTES
Tried to reach patient again to reschedule anticoag appt. Phone number does not ring and there is no vm now. Will send message to provider.     
Detail Level: Simple
Detail Level: Detailed
Detail Level: Generalized

## 2025-07-21 NOTE — CARE PLAN
Pharmacy asking for refill.   Problem: Safety  Goal: Will remain free from injury  Outcome: PROGRESSING AS EXPECTED  Treaded socks on, bed in lowest position, personal belongings within reach, patient educated to use call light, and lower bed rails up.     Problem: Bowel/Gastric:  Goal: Normal bowel function is maintained or improved  Outcome: PROGRESSING SLOWER THAN EXPECTED  Patient has not had a bowel movement this stay